# Patient Record
Sex: MALE | Race: WHITE | Employment: OTHER | URBAN - METROPOLITAN AREA
[De-identification: names, ages, dates, MRNs, and addresses within clinical notes are randomized per-mention and may not be internally consistent; named-entity substitution may affect disease eponyms.]

---

## 2017-01-03 ENCOUNTER — OFFICE VISIT (OUTPATIENT)
Dept: INTERNAL MEDICINE CLINIC | Age: 72
End: 2017-01-03

## 2017-01-03 VITALS
HEART RATE: 82 BPM | WEIGHT: 190.6 LBS | TEMPERATURE: 98.7 F | DIASTOLIC BLOOD PRESSURE: 90 MMHG | SYSTOLIC BLOOD PRESSURE: 142 MMHG | BODY MASS INDEX: 26.68 KG/M2 | RESPIRATION RATE: 12 BRPM | OXYGEN SATURATION: 95 % | HEIGHT: 71 IN

## 2017-01-03 DIAGNOSIS — G89.29 CHRONIC LOW BACK PAIN WITH SCIATICA, SCIATICA LATERALITY UNSPECIFIED, UNSPECIFIED BACK PAIN LATERALITY: ICD-10-CM

## 2017-01-03 DIAGNOSIS — M54.50 CHRONIC BILATERAL LOW BACK PAIN WITHOUT SCIATICA: ICD-10-CM

## 2017-01-03 DIAGNOSIS — M54.40 CHRONIC LOW BACK PAIN WITH SCIATICA, SCIATICA LATERALITY UNSPECIFIED, UNSPECIFIED BACK PAIN LATERALITY: ICD-10-CM

## 2017-01-03 DIAGNOSIS — H65.91 RIGHT OTITIS MEDIA WITH EFFUSION: Primary | ICD-10-CM

## 2017-01-03 DIAGNOSIS — E78.5 HYPERLIPIDEMIA WITH TARGET LDL LESS THAN 100: ICD-10-CM

## 2017-01-03 DIAGNOSIS — H91.91 HEARING LOSS, RIGHT: ICD-10-CM

## 2017-01-03 DIAGNOSIS — G89.29 CHRONIC BILATERAL LOW BACK PAIN WITHOUT SCIATICA: ICD-10-CM

## 2017-01-03 DIAGNOSIS — I10 ESSENTIAL HYPERTENSION: ICD-10-CM

## 2017-01-03 RX ORDER — OXYCODONE HYDROCHLORIDE 80 MG/1
160 TABLET, FILM COATED, EXTENDED RELEASE ORAL EVERY 12 HOURS
Qty: 120 TAB | Refills: 0 | Status: SHIPPED | OUTPATIENT
Start: 2017-01-03 | End: 2017-03-09 | Stop reason: SDUPTHER

## 2017-01-03 RX ORDER — OXYCODONE HYDROCHLORIDE 80 MG/1
160 TABLET, FILM COATED, EXTENDED RELEASE ORAL EVERY 12 HOURS
Qty: 120 TAB | Refills: 0 | Status: SHIPPED | OUTPATIENT
Start: 2017-01-03 | End: 2017-03-31 | Stop reason: SDUPTHER

## 2017-01-03 RX ORDER — OXYCODONE HYDROCHLORIDE 30 MG/1
30 TABLET ORAL
Qty: 90 TAB | Refills: 0 | Status: SHIPPED | OUTPATIENT
Start: 2017-01-03 | End: 2017-03-09 | Stop reason: SDUPTHER

## 2017-01-03 RX ORDER — OXYCODONE HYDROCHLORIDE 30 MG/1
30 TABLET ORAL
Qty: 90 TAB | Refills: 0 | Status: SHIPPED | OUTPATIENT
Start: 2017-01-03 | End: 2017-03-31 | Stop reason: SDUPTHER

## 2017-01-03 NOTE — PROGRESS NOTES
HISTORY OF PRESENT ILLNESS    Presents with right ear pressure and mild fullness for 10 day(s). Associated symptoms include: decreased hearing   Treatments tried include: was seen in ER 12/22 and 12.23 and placed on augmentin 12/24. Feels the pain is improving, but still reports decreased hearing. Taking mucinex and flonase    Feels his nausea is improved and denies abd pain. Did not have labs done  Wt Readings from Last 3 Encounters:   01/03/17 190 lb 9.6 oz (86.5 kg)   12/24/16 186 lb (84.4 kg)   12/23/16 194 lb 14.2 oz (88.4 kg)         Hypertension  Hypertension ROS: taking medications as instructed, no medication side effects noted, no TIA's, no chest pain on exertion, no dyspnea on exertion, no swelling of ankles     reports that he has never smoked. He has never used smokeless tobacco.    reports that he does not drink alcohol. BP Readings from Last 2 Encounters:   01/03/17 142/90   12/24/16 135/87       Review of Systems   All other systems reviewed and are negative, except as noted in HPI    Past Medical and Surgical History   has a past medical history of Arthritis; Chronic back pain; Chronic neck pain (2004); Depression, major; HTN (hypertension); Hyperlipidemia LDL goal < 100; Lumbar radicular pain; Psoriasis; PTSD (post-traumatic stress disorder); Seborrheic dermatitis; and Stenosis of cervical spine with myelopathy. has a past surgical history that includes lumbar laminectomy (1360) and colonoscopy (2008). reports that he has never smoked. He has never used smokeless tobacco. He reports that he does not drink alcohol.  family history includes Cancer in his mother. Physical Exam   Nursing note and vitals reviewed. Blood pressure 142/90, pulse 82, temperature 98.7 °F (37.1 °C), temperature source Oral, resp. rate 12, height 5' 11\" (1.803 m), weight 190 lb 9.6 oz (86.5 kg), SpO2 95 %. Constitutional: In no distress.     Eyes: Conjunctivae are normal.  HEENT:  No LAD or thyromegaly - right TM with erythema superiorly, no sig bulging, perhaps mild effusion  Cardiovascular: Normal rate. regular rhythm. No murmurs  No edema  Pulmonary/Chest: Effort normal. clear to ausculation blaterally  Musculoskeletal:  no edema. Abd:  Neurological: Alert and oriented. Grossly intact cranial nerves and motor function. Skin: No rash noted. Psychiatric: Normal mood and affect. Behavior is normal.     ASSESSMENT and PLAN  José Manuel was seen today for ed follow-up. Diagnoses and all orders for this visit:    Right otitis media with effusion  Hearing loss, right  Still recovering from OM. Should improve. Monitor for now. See ENT next week if hearing loss continues. -     REFERRAL TO ENT-OTOLARYNGOLOGY    Low back pain radiating to both legs- severe  Chronic back pain  Stenosis of cervical spine with myelopathy - moderate pain  Pain is controlled on high dose narcotics which we have weaned back from #180 oxycodone to #90 per month. Can not tolerate further weaning   He is unwilling to consider surgery due to risk. Surgery would be complicated and both lumbar and cervical spinal surgeries may be needed to reduce pain lvels  Injections, TENS units have not helped  Spinal cord stimulator device may help, but U was unwilling to do this without a pain management doctor seeing him. I have been unable to get a pain management doctor who takes his insurance to manage him  Has a clear definable reason for pain  He is functioning well   profile was accessed online for [facility] and reviewed by me during this encounter. I did not see evidence of inappropriate or suspicious controlled substance prescription activity. He has never demonstrated drug-seeking, but is habituated to long-term high-dose narcotic tx  -     oxyCODONE ER (OXYCONTIN) 80 mg ER tablet; Take 2 Tabs by mouth every twelve (12) hours. Max Daily Amount: 320 mg. Fill 2/9/17  -     oxyCODONE ER (OXYCONTIN) 80 mg ER tablet;  Take 2 Tabs by mouth every twelve (12) hours. Max Daily Amount: 320 mg. Fill 1/10/17  -     oxyCODONE IR (OXY-IR) 30 mg immediate release tablet; Take 1 Tab by mouth every eight (8) hours as needed for Pain. Max Daily Amount: 90 mg. For breakthrough pain. Fill 2/9/17  -     COMPLIANCE DRUG SCREEN/PRESCRIPTION MONITORING    Hyperlipidemia with target LDL less than 100  -     LIPID PANEL    Essential hypertension- Controlled on current regimen. Continue current medications as written in chart.  -     METABOLIC PANEL, COMPREHENSIVE        lab results and schedule of future lab studies reviewed with patient  reviewed medications and side effects in detail    Return to clinic for further evaluation if new symptoms develop or if current symptoms worsen or fail to resolve. There are no Patient Instructions on file for this visit.

## 2017-01-03 NOTE — MR AVS SNAPSHOT
Visit Information Date & Time Provider Department Dept. Phone Encounter #  
 1/3/2017 11:30 AM Aurelia Krause MD [facility] [address] [id number] Upcoming Health Maintenance Date Due Hepatitis C Screening 1945 ZOSTER VACCINE AGE 60> 12/1/2007 MEDICARE YEARLY EXAM 12/1/2012 Pneumococcal 65+ Low/Medium Risk (2 of 2 - PCV13) 8/12/2014 INFLUENZA AGE 9 TO ADULT 8/1/2016 GLAUCOMA SCREENING Q2Y 7/6/2017 COLONOSCOPY 1/1/2018 DTaP/Tdap/Td series (2 - Td) 8/12/2023 Allergies as of 1/3/2017  Review Complete On: 1/3/2017 By: Saeid Leblanc Severity Noted Reaction Type Reactions Amlodipine  02/07/2013    Nausea Only Wellbutrin [Bupropion Hcl]  01/30/2014    Other (comments) \"Stomach pressure\" Current Immunizations  Reviewed on 8/12/2013 Name Date Pneumococcal Polysaccharide (PPSV-23) 8/12/2013 Tdap 8/12/2013 Not reviewed this visit You Were Diagnosed With   
  
 Codes Comments Right otitis media with effusion    -  Primary ICD-10-CM: H65.91 
ICD-9-CM: 381.4 Hearing loss, right     ICD-10-CM: H91.91 
ICD-9-CM: 389. 9 Chronic low back pain with sciatica, sciatica laterality unspecified, unspecified back pain laterality     ICD-10-CM: M54.40, G89.29 ICD-9-CM: 724.2, 724.3, 338.29 Chronic bilateral low back pain without sciatica     ICD-10-CM: M54.5, G89.29 ICD-9-CM: 724.2, 338.29 Hyperlipidemia with target LDL less than 100     ICD-10-CM: E78.5 ICD-9-CM: 272.4 Essential hypertension     ICD-10-CM: I10 
ICD-9-CM: 401.9 Vitals BP Pulse Temp Resp Height(growth percentile) Weight(growth percentile) 142/90 (BP 1 Location: Left arm, BP Patient Position: Sitting) 82 98.7 °F (37.1 °C) (Oral) 12 5' 11\" (1.803 m) 190 lb 9.6 oz (86.5 kg) SpO2 BMI Smoking Status 95% 26.58 kg/m2 Never Smoker Vitals History BMI and BSA Data Body Mass Index Body Surface Area 26.58 kg/m 2 2.08 m 2 Preferred Pharmacy Pharmacy Name Phone [facility] [address] [place] [address] Dr STEARNS AT [facility] [phone] Your Updated Medication List  
  
   
This list is accurate as of: 1/3/17 12:29 PM.  Always use your most recent med list. amLODIPine 10 mg tablet Commonly known as:  Arecibo Mend Take 1 Tab by mouth daily. For blood pressure- increased dose 7//14/16  Indications: Hypertension  
  
 amoxicillin-clavulanate 500-125 mg per tablet Commonly known as:  AUGMENTIN Take 1 Tab by mouth two (2) times a day. clobetasol 0.05 % topical cream  
Commonly known as:  Caryle Killer Apply  to affected area two (2) times a day for 180 days. cloNIDine HCl 0.1 mg tablet Commonly known as:  CATAPRES Take 1 Tab by mouth nightly. dextromethorphan-guaiFENesin  mg Cap Commonly known as:  CORICIDIN HBP Take 1 Cap by mouth two (2) times daily as needed. fluticasone 50 mcg/actuation nasal spray Commonly known as:  Cathlyn Fred 2 Sprays by Both Nostrils route daily. guaiFENesin  mg ER tablet Commonly known as:  Mark & Mark Take 1 Tab by mouth two (2) times a day. lisinopril-hydroCHLOROthiazide 20-25 mg per tablet Commonly known as:  Donnamarie Parrot Take 1 pill TWICE daily  
  
 omeprazole 20 mg capsule Commonly known as:  PRILOSEC Take 1 Cap by mouth daily. For stomach acid - replaces ranitidine * oxyCODONE CR 80 mg CR tablet Commonly known as:  OxyCONTIN Take 2 Tabs by mouth every twelve (12) hours. Max Daily Amount: 320 mg. Fill 8/17/16. MEDICATION MEDICALLY NECESSARY  
  
 * oxyCODONE IR 30 mg immediate release tablet Commonly known as:  OXY-IR Take 1 Tab by mouth every eight (8) hours as needed for Pain. Max Daily Amount: 90 mg. For breakthrough pain. Fill 9/14/16 * oxyCODONE ER 80 mg ER tablet Commonly known as:  OxyCONTIN  
 Take 2 Tabs by mouth every twelve (12) hours. Max Daily Amount: 320 mg. Fill 2/9/17 * oxyCODONE ER 80 mg ER tablet Commonly known as:  OxyCONTIN Take 2 Tabs by mouth every twelve (12) hours. Max Daily Amount: 320 mg. Fill 1/10/17 * oxyCODONE IR 30 mg immediate release tablet Commonly known as:  OXY-IR Take 1 Tab by mouth every eight (8) hours as needed for Pain. Max Daily Amount: 90 mg. For breakthrough pain. Fill 1/10/17 * oxyCODONE IR 30 mg immediate release tablet Commonly known as:  OXY-IR Take 1 Tab by mouth every eight (8) hours as needed for Pain. Max Daily Amount: 90 mg. For breakthrough pain. Fill 2/9/17 * Notice: This list has 6 medication(s) that are the same as other medications prescribed for you. Read the directions carefully, and ask your doctor or other care provider to review them with you. Prescriptions Printed Refills  
 oxyCODONE ER (OXYCONTIN) 80 mg ER tablet 0 Sig: Take 2 Tabs by mouth every twelve (12) hours. Max Daily Amount: 320 mg. Fill 2/9/17 Class: Print Route: Oral  
 oxyCODONE ER (OXYCONTIN) 80 mg ER tablet 0 Sig: Take 2 Tabs by mouth every twelve (12) hours. Max Daily Amount: 320 mg. Fill 1/10/17 Class: Print Route: Oral  
 oxyCODONE IR (OXY-IR) 30 mg immediate release tablet 0 Sig: Take 1 Tab by mouth every eight (8) hours as needed for Pain. Max Daily Amount: 90 mg. For breakthrough pain. Fill 1/10/17 Class: Print Route: Oral  
 oxyCODONE IR (OXY-IR) 30 mg immediate release tablet 0 Sig: Take 1 Tab by mouth every eight (8) hours as needed for Pain. Max Daily Amount: 90 mg. For breakthrough pain. Fill 2/9/17 Class: Print Route: Oral  
  
Referral Information Referral ID Referred By Referred To  
  
 [id number] [facility] [address] [place] [address] [phone] [place], [address] [facility] Visits Status Start Date End Date 1 New Request 1/3/17 1/3/18 If your referral has a status of pending review or denied, additional information will be sent to support the outcome of this decision. Introducing [facility]! Pete Bowman introduces [employer] patient portal. Now you can access parts of your medical record, email your doctor's office, and request medication refills online. 1. In your internet browser, go to [email or web address] 2. Click on the First Time User? Click Here link in the Sign In box. You will see the New Member Sign Up page. 3. Enter your Syn[employer] Access Code exactly as it appears below. You will not need to use this code after youve completed the sign-up process. If you do not sign up before the expiration date, you must request a new code. · Syn[employer] Access Code: [email or web address] Expires: 3/23/2017  3:24 PM 
 
4. Enter the last four digits of your Social Security Number (xxxx) and Date of Birth (mm/dd/yyyy) as indicated and click Submit. You will be taken to the next sign-up page. 5. Create a Synchronica ID. This will be your Synchronic[employer] login ID and cannot be changed, so think of one that is secure and easy to remember. 6. Create a Syn[employer] password. You can change your password at any time. 7. Enter your Password Reset Question and Answer. This can be used at a later time if you forget your password. 8. Enter your e-mail address. You will receive e-mail notification when new information is available in [address] 9. Click Sign Up. You can now view and download portions of your medical record. 10. Click the Download Summary menu link to download a portable copy of your medical information. If you have questions, please visit the Frequently Asked Questions section of the [employer] website. Remember, [employer] is NOT to be used for urgent needs. For medical emergencies, dial 911. Now available from your iPhone and Android! Please provide this summary of care documentation to your next provider. Your primary care clinician is listed as Roman Fountain. If you have any questions after today's visit, please call [phone].

## 2017-01-03 NOTE — PROGRESS NOTES
Patient states he was seen in ER twice. He was given an antibiotic for a right otitis media. His ear is blocked today.

## 2017-01-16 ENCOUNTER — HOSPITAL ENCOUNTER (OUTPATIENT)
Dept: LAB | Age: 72
Discharge: HOME | End: 2017-01-16
Payer: MEDICARE

## 2017-01-16 PROCEDURE — 36415 COLL VENOUS BLD VENIPUNCTURE: CPT

## 2017-01-16 PROCEDURE — 82570 ASSAY OF URINE CREATININE: CPT

## 2017-01-16 PROCEDURE — 80061 LIPID PANEL: CPT

## 2017-01-16 PROCEDURE — 80053 COMPREHEN METABOLIC PANEL: CPT

## 2017-01-17 LAB
ALBUMIN SERPL-MCNC: 4.4 G/DL (ref 3.6–4.8)
ALBUMIN/GLOB SERPL: 1.5 {RATIO} (ref 1.1–2.5)
ALP SERPL-CCNC: 70 IU/L (ref 39–117)
ALT SERPL-CCNC: 24 IU/L (ref 0–44)
AST SERPL-CCNC: 22 IU/L (ref 0–40)
BILIRUB SERPL-MCNC: 0.4 MG/DL (ref 0–1.2)
BUN SERPL-MCNC: 23 MG/DL (ref 8–27)
BUN/CREAT SERPL: 21 (ref 10–22)
CALCIUM SERPL-MCNC: 9.7 MG/DL (ref 8.6–10.2)
CHLORIDE SERPL-SCNC: 97 MMOL/L (ref 96–106)
CHOLEST SERPL-MCNC: 206 MG/DL (ref 100–199)
CO2 SERPL-SCNC: 28 MMOL/L (ref 18–29)
CREAT SERPL-MCNC: 1.09 MG/DL (ref 0.76–1.27)
DRUGS UR: NORMAL
GLOBULIN SER CALC-MCNC: 2.9 G/DL (ref 1.5–4.5)
GLUCOSE SERPL-MCNC: 81 MG/DL (ref 65–99)
HDLC SERPL-MCNC: 64 MG/DL
INTERPRETATION, 910389: NORMAL
LDLC SERPL CALC-MCNC: 116 MG/DL (ref 0–99)
POTASSIUM SERPL-SCNC: 4.4 MMOL/L (ref 3.5–5.2)
PROT SERPL-MCNC: 7.3 G/DL (ref 6–8.5)
SODIUM SERPL-SCNC: 142 MMOL/L (ref 134–144)
TRIGL SERPL-MCNC: 132 MG/DL (ref 0–149)
VLDLC SERPL CALC-MCNC: 26 MG/DL (ref 5–40)

## 2017-03-06 ENCOUNTER — OFFICE VISIT (OUTPATIENT)
Dept: INTERNAL MEDICINE CLINIC | Age: 72
End: 2017-03-06

## 2017-03-06 VITALS
DIASTOLIC BLOOD PRESSURE: 84 MMHG | SYSTOLIC BLOOD PRESSURE: 145 MMHG | OXYGEN SATURATION: 98 % | TEMPERATURE: 98.2 F | HEIGHT: 71 IN | HEART RATE: 85 BPM | WEIGHT: 196 LBS | RESPIRATION RATE: 16 BRPM | BODY MASS INDEX: 27.44 KG/M2

## 2017-03-06 DIAGNOSIS — H91.91 HEARING LOSS IN RIGHT EAR: Primary | ICD-10-CM

## 2017-03-06 NOTE — PROGRESS NOTES
Have you been to the ER or urgent care clinic since your last visit? ER  Rene Will   12/24/16  Severe right ear pain     meds x 10 days    Better      On and off pain since    Have you been hospitalized since your last visit? No     Have you been seen or consulted any other health care provider outside of [address] [place] [address] since your last visit (including pap smears, colonoscopy screening)?     No

## 2017-03-06 NOTE — PROGRESS NOTES
HISTORY OF PRESENT ILLNESS  Shree Yeung is a 73 y.o. male. HPI  Presents to discuss his ongoing hearing loss in right ear. Was treated for infection in 12/2016 and 1/2017 and developed sudden hearing loss; was referred to ENT by Dr Myriam Degroot; he reports ENT told him that he did not see any signs of chronic fluid and was scheduled to have MRI of brain to assess for possible sensorineural etiology of hearing loss. He has not scheduled this and states that he does not want to have this done and wants Dr Servando Solorzano opinion. Has occasional crackling sensation in right ear when swallowing. Review of Systems   Constitutional: Negative for chills, fever and malaise/fatigue. HENT: Positive for ear pain and hearing loss. Negative for congestion and sore throat. Respiratory: Negative for cough. Cardiovascular: Negative for chest pain and palpitations. Gastrointestinal: Negative for nausea and vomiting. Skin: Negative for rash. Neurological: Negative for dizziness, tingling and headaches. /84 (BP 1 Location: Left arm, BP Patient Position: Sitting)  Pulse 85  Temp 98.2 °F (36.8 °C) (Oral)   Resp 16  Ht 5' 11\" (1.803 m)  Wt 196 lb (88.9 kg)  SpO2 98%  BMI 27.34 kg/m2  Physical Exam   Constitutional: He appears well-developed and well-nourished. HENT:   Head: Normocephalic and atraumatic. Right Ear: External ear normal. Tympanic membrane is not injected. No middle ear effusion. Left Ear: External ear normal. Tympanic membrane is not injected. No middle ear effusion. Nose: Nose normal. No mucosal edema. Mouth/Throat: No posterior oropharyngeal erythema. Neck: Normal range of motion. Neck supple. No thyromegaly present. Cardiovascular: Normal rate and regular rhythm. Pulmonary/Chest: Effort normal and breath sounds normal.   Lymphadenopathy:     He has no cervical adenopathy. Psychiatric: He has a normal mood and affect.  His behavior is normal.   Nursing note and vitals reviewed. ASSESSMENT and PLAN  José Manuel was seen today for ear pain. Diagnoses and all orders for this visit:    Hearing loss in right ear --- advised patient that MRI may be necessary for further evaluation of his persistent hearing loss; offered to give him another name of ENT for second opinion if desired but he would like Dr Effie Mora opinion on this matter.       reviewed diet, exercise and weight control  reviewed medications and side effects in detail

## 2017-03-09 ENCOUNTER — TELEPHONE (OUTPATIENT)
Dept: INTERNAL MEDICINE CLINIC | Age: 72
End: 2017-03-09

## 2017-03-09 DIAGNOSIS — M54.40 CHRONIC LOW BACK PAIN WITH SCIATICA, SCIATICA LATERALITY UNSPECIFIED, UNSPECIFIED BACK PAIN LATERALITY: ICD-10-CM

## 2017-03-09 DIAGNOSIS — M54.50 CHRONIC BILATERAL LOW BACK PAIN WITHOUT SCIATICA: ICD-10-CM

## 2017-03-09 DIAGNOSIS — G89.29 CHRONIC BILATERAL LOW BACK PAIN WITHOUT SCIATICA: ICD-10-CM

## 2017-03-09 DIAGNOSIS — G89.29 CHRONIC LOW BACK PAIN WITH SCIATICA, SCIATICA LATERALITY UNSPECIFIED, UNSPECIFIED BACK PAIN LATERALITY: ICD-10-CM

## 2017-03-09 RX ORDER — OXYCODONE HYDROCHLORIDE 80 MG/1
160 TABLET, FILM COATED, EXTENDED RELEASE ORAL EVERY 12 HOURS
Qty: 120 TAB | Refills: 0 | Status: SHIPPED | OUTPATIENT
Start: 2017-03-09 | End: 2017-03-31 | Stop reason: SDUPTHER

## 2017-03-09 RX ORDER — OXYCODONE HYDROCHLORIDE 30 MG/1
30 TABLET ORAL
Qty: 90 TAB | Refills: 0 | Status: SHIPPED | OUTPATIENT
Start: 2017-03-09 | End: 2017-03-31 | Stop reason: SDUPTHER

## 2017-03-09 NOTE — TELEPHONE ENCOUNTER
Patient is requesting pain medication to be filled today.   He wants a nurse to call him back and let him know if he can get pain medication because if not he is going to the ER to get it.  [phone]

## 2017-03-09 NOTE — TELEPHONE ENCOUNTER
Last scripts were filled 2/9/2017, pt is not out but will be Friday.  Appointment made, last appointment with PCP was a no show

## 2017-03-10 ENCOUNTER — DOCUMENTATION ONLY (OUTPATIENT)
Dept: INTERNAL MEDICINE CLINIC | Age: 72
End: 2017-03-10

## 2017-03-31 ENCOUNTER — HOSPITAL ENCOUNTER (OUTPATIENT)
Dept: LAB | Age: 72
Discharge: HOME | End: 2017-03-31
Payer: MEDICARE

## 2017-03-31 ENCOUNTER — OFFICE VISIT (OUTPATIENT)
Dept: INTERNAL MEDICINE CLINIC | Age: 72
End: 2017-03-31

## 2017-03-31 VITALS
SYSTOLIC BLOOD PRESSURE: 175 MMHG | DIASTOLIC BLOOD PRESSURE: 94 MMHG | TEMPERATURE: 98.4 F | WEIGHT: 205 LBS | HEIGHT: 71 IN | HEART RATE: 97 BPM | RESPIRATION RATE: 12 BRPM | BODY MASS INDEX: 28.7 KG/M2

## 2017-03-31 DIAGNOSIS — F43.10 PTSD (POST-TRAUMATIC STRESS DISORDER): ICD-10-CM

## 2017-03-31 DIAGNOSIS — H69.81 DYSFUNCTION OF RIGHT EUSTACHIAN TUBE: Primary | ICD-10-CM

## 2017-03-31 DIAGNOSIS — M48.02 STENOSIS OF CERVICAL SPINE WITH MYELOPATHY (HCC): ICD-10-CM

## 2017-03-31 DIAGNOSIS — M54.40 CHRONIC LOW BACK PAIN WITH SCIATICA, SCIATICA LATERALITY UNSPECIFIED, UNSPECIFIED BACK PAIN LATERALITY: ICD-10-CM

## 2017-03-31 DIAGNOSIS — L40.9 PSORIASIS: ICD-10-CM

## 2017-03-31 DIAGNOSIS — I10 ESSENTIAL HYPERTENSION: ICD-10-CM

## 2017-03-31 DIAGNOSIS — G89.29 CHRONIC BILATERAL LOW BACK PAIN WITHOUT SCIATICA: ICD-10-CM

## 2017-03-31 DIAGNOSIS — Z51.81 ENCOUNTER FOR MEDICATION MONITORING: ICD-10-CM

## 2017-03-31 DIAGNOSIS — G89.29 CHRONIC LOW BACK PAIN WITH SCIATICA, SCIATICA LATERALITY UNSPECIFIED, UNSPECIFIED BACK PAIN LATERALITY: ICD-10-CM

## 2017-03-31 DIAGNOSIS — G99.2 STENOSIS OF CERVICAL SPINE WITH MYELOPATHY (HCC): ICD-10-CM

## 2017-03-31 DIAGNOSIS — M54.50 CHRONIC BILATERAL LOW BACK PAIN WITHOUT SCIATICA: ICD-10-CM

## 2017-03-31 PROCEDURE — 82570 ASSAY OF URINE CREATININE: CPT

## 2017-03-31 RX ORDER — OXYCODONE HYDROCHLORIDE 30 MG/1
30 TABLET ORAL
Qty: 90 TAB | Refills: 0 | Status: SHIPPED | OUTPATIENT
Start: 2017-03-31 | End: 2017-06-05 | Stop reason: SDUPTHER

## 2017-03-31 RX ORDER — CLOBETASOL PROPIONATE 0.5 MG/G
CREAM TOPICAL 2 TIMES DAILY
Qty: 30 G | Refills: 5 | Status: SHIPPED | OUTPATIENT
Start: 2017-03-31 | End: 2017-06-05 | Stop reason: ALTCHOICE

## 2017-03-31 RX ORDER — OXYCODONE HYDROCHLORIDE 80 MG/1
160 TABLET, FILM COATED, EXTENDED RELEASE ORAL EVERY 12 HOURS
Qty: 120 TAB | Refills: 0 | Status: SHIPPED | OUTPATIENT
Start: 2017-03-31 | End: 2017-06-05 | Stop reason: SDUPTHER

## 2017-03-31 RX ORDER — METHYLPREDNISOLONE 4 MG/1
TABLET ORAL
Qty: 1 DOSE PACK | Refills: 0 | Status: SHIPPED | OUTPATIENT
Start: 2017-03-31 | End: 2017-04-10

## 2017-03-31 NOTE — MR AVS SNAPSHOT
Visit Information Date & Time Provider Department Dept. Phone Encounter #  
 3/31/2017  4:00 PM Howard Bearden MD [facility] [address] [id number] Upcoming Health Maintenance Date Due Hepatitis C Screening 1945 ZOSTER VACCINE AGE 60> 12/1/2007 MEDICARE YEARLY EXAM 12/1/2012 Pneumococcal 65+ Low/Medium Risk (2 of 2 - PCV13) 8/12/2014 INFLUENZA AGE 9 TO ADULT 8/1/2016 GLAUCOMA SCREENING Q2Y 7/6/2017 COLONOSCOPY 1/1/2018 DTaP/Tdap/Td series (2 - Td) 8/12/2023 Allergies as of 3/31/2017  Review Complete On: 3/31/2017 By: Howard Bearden MD  
  
 Severity Noted Reaction Type Reactions Amlodipine  02/07/2013    Nausea Only Wellbutrin [Bupropion Hcl]  01/30/2014    Other (comments) \"Stomach pressure\" Current Immunizations  Reviewed on 8/12/2013 Name Date Pneumococcal Polysaccharide (PPSV-23) 8/12/2013 Tdap 8/12/2013 Not reviewed this visit You Were Diagnosed With   
  
 Codes Comments Dysfunction of right eustachian tube    -  Primary ICD-10-CM: H69.81 ICD-9-CM: 381.81 Essential hypertension     ICD-10-CM: I10 
ICD-9-CM: 401.9 Encounter for medication monitoring     ICD-10-CM: Z51.81 
ICD-9-CM: V58.83 Stenosis of cervical spine with myelopathy     ICD-10-CM: M47.12 
ICD-9-CM: 721.1 PTSD (post-traumatic stress disorder)     ICD-10-CM: F43.10 ICD-9-CM: 309.81 Chronic low back pain with sciatica, sciatica laterality unspecified, unspecified back pain laterality     ICD-10-CM: M54.40, G89.29 ICD-9-CM: 724.2, 724.3, 338.29 Chronic bilateral low back pain without sciatica     ICD-10-CM: M54.5, G89.29 ICD-9-CM: 724.2, 338.29 Psoriasis     ICD-10-CM: L40.9 ICD-9-CM: 696.1 Vitals BP Pulse Temp Resp Height(growth percentile) Weight(growth percentile)  (!) 175/94 (BP 1 Location: Left arm, BP Patient Position: Sitting) 97 98.4 °F (36.9 °C) (Oral) 12 5' 11\" (1.803 m) 205 lb (93 kg) BMI Smoking Status 28.59 kg/m2 Never Smoker Vitals History BMI and BSA Data Body Mass Index Body Surface Area 28.59 kg/m 2 2.16 m 2 Preferred Pharmacy Pharmacy Name Phone [facility] [address] [place] [address] Dr STEARNS AT [facility] [phone] Your Updated Medication List  
  
   
This list is accurate as of: 3/31/17  5:10 PM.  Always use your most recent med list. amLODIPine 10 mg tablet Commonly known as:  Voncile Erie Take 1 Tab by mouth daily. For blood pressure- increased dose 7//14/16  Indications: Hypertension  
  
 clobetasol 0.05 % topical cream  
Commonly known as:  Linzie Rubins Apply  to affected area two (2) times a day. cloNIDine HCl 0.1 mg tablet Commonly known as:  CATAPRES Take 1 Tab by mouth nightly. lisinopril-hydroCHLOROthiazide 20-25 mg per tablet Commonly known as:  Yon Vania Take 1 pill TWICE daily  
  
 methylPREDNISolone 4 mg tablet Commonly known as:  Barbarann Punt USE AS DIRECTED ON PACKAGE  
  
 omeprazole 20 mg capsule Commonly known as:  PRILOSEC Take 1 Cap by mouth daily. For stomach acid - replaces ranitidine * oxyCODONE CR 80 mg CR tablet Commonly known as:  OxyCONTIN Take 2 Tabs by mouth every twelve (12) hours. Max Daily Amount: 320 mg. Fill 8/17/16. MEDICATION MEDICALLY NECESSARY  
  
 * oxyCODONE IR 30 mg immediate release tablet Commonly known as:  OXY-IR Take 1 Tab by mouth every eight (8) hours as needed for Pain. Max Daily Amount: 90 mg. For breakthrough pain. Fill 9/14/16 * oxyCODONE IR 30 mg immediate release tablet Commonly known as:  OXY-IR Take 1 Tab by mouth every eight (8) hours as needed for Pain. Max Daily Amount: 90 mg. For breakthrough pain. Fill 5/8/17 * oxyCODONE ER 80 mg ER tablet Commonly known as:  OxyCONTIN  
 Take 2 Tabs by mouth every twelve (12) hours. Max Daily Amount: 320 mg. Fill 5/8/17 * oxyCODONE IR 30 mg immediate release tablet Commonly known as:  OXY-IR Take 1 Tab by mouth every eight (8) hours as needed for Pain. Max Daily Amount: 90 mg. For breakthrough pain. Fill 4/9/17 * oxyCODONE ER 80 mg ER tablet Commonly known as:  OxyCONTIN Take 2 Tabs by mouth every twelve (12) hours. Max Daily Amount: 320 mg. Fill 4/9/17 * Notice: This list has 6 medication(s) that are the same as other medications prescribed for you. Read the directions carefully, and ask your doctor or other care provider to review them with you. Prescriptions Printed Refills  
 oxyCODONE IR (OXY-IR) 30 mg immediate release tablet 0 Sig: Take 1 Tab by mouth every eight (8) hours as needed for Pain. Max Daily Amount: 90 mg. For breakthrough pain. Fill 5/8/17 Class: Print Route: Oral  
 oxyCODONE ER (OXYCONTIN) 80 mg ER tablet 0 Sig: Take 2 Tabs by mouth every twelve (12) hours. Max Daily Amount: 320 mg. Fill 5/8/17 Class: Print Route: Oral  
 oxyCODONE IR (OXY-IR) 30 mg immediate release tablet 0 Sig: Take 1 Tab by mouth every eight (8) hours as needed for Pain. Max Daily Amount: 90 mg. For breakthrough pain. Fill 4/9/17 Class: Print Route: Oral  
 oxyCODONE ER (OXYCONTIN) 80 mg ER tablet 0 Sig: Take 2 Tabs by mouth every twelve (12) hours. Max Daily Amount: 320 mg. Fill 4/9/17 Class: Print Route: Oral  
  
Prescriptions Sent to Pharmacy Refills  
 methylPREDNISolone (MEDROL DOSEPACK) 4 mg tablet 0 Sig: USE AS DIRECTED ON PACKAGE Class: Normal  
 Pharmacy: [facility] Ph #: [phone]  
 clobetasol (TEMOVATE) 0.05 % topical cream 5 Sig: Apply  to affected area two (2) times a day.   
 Class: Normal  
 Pharmacy: [facility] [address],[facility]  #: [phone] Route: Topical  
  
We Performed the Following [address] MONITORING [[id number] Custom] Introducing [facility]! Catracho Deluna introduces [employer] patient portal. Now you can access parts of your medical record, email your doctor's office, and request medication refills online. 1. In your internet browser, go to [email or web address] 2. Click on the First Time User? Click Here link in the Sign In box. You will see the New Member Sign Up page. 3. Enter your [employer] Access Code exactly as it appears below. You will not need to use this code after youve completed the sign-up process. If you do not sign up before the expiration date, you must request a new code. · [employer] Access Code: [email or web address] Expires: 6/29/2017  5:10 PM 
 
4. Enter the last four digits of your Social Security Number (xxxx) and Date of Birth (mm/dd/yyyy) as indicated and click Submit. You will be taken to the next sign-up page. 5. Create a [employer] ID. This will be your [employer] login ID and cannot be changed, so think of one that is secure and easy to remember. 6. Create a [employer] password. You can change your password at any time. 7. Enter your Password Reset Question and Answer. This can be used at a later time if you forget your password. 8. Enter your e-mail address. You will receive e-mail notification when new information is available in [address] 9. Click Sign Up. You can now view and download portions of your medical record. 10. Click the Download Summary menu link to download a portable copy of your medical information. If you have questions, please visit the Frequently Asked Questions section of the [employer] website. Remember, [employer] is NOT to be used for urgent needs. For medical emergencies, dial 911. Now available from your iPhone and Android! Please provide this summary of care documentation to your next provider. Your primary care clinician is listed as Magalys Glass. If you have any questions after today's visit, please call [phone].
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
## 2017-04-06 LAB — DRUGS UR: NORMAL

## 2017-06-05 ENCOUNTER — OFFICE VISIT (OUTPATIENT)
Dept: INTERNAL MEDICINE CLINIC | Age: 72
End: 2017-06-05

## 2017-06-05 VITALS
SYSTOLIC BLOOD PRESSURE: 123 MMHG | RESPIRATION RATE: 18 BRPM | WEIGHT: 203 LBS | HEART RATE: 74 BPM | TEMPERATURE: 98.3 F | OXYGEN SATURATION: 96 % | HEIGHT: 71 IN | BODY MASS INDEX: 28.42 KG/M2 | DIASTOLIC BLOOD PRESSURE: 86 MMHG

## 2017-06-05 DIAGNOSIS — M54.50 CHRONIC BILATERAL LOW BACK PAIN WITHOUT SCIATICA: ICD-10-CM

## 2017-06-05 DIAGNOSIS — G89.29 CHRONIC LOW BACK PAIN WITH SCIATICA, SCIATICA LATERALITY UNSPECIFIED, UNSPECIFIED BACK PAIN LATERALITY: ICD-10-CM

## 2017-06-05 DIAGNOSIS — G89.29 CHRONIC BILATERAL LOW BACK PAIN WITHOUT SCIATICA: ICD-10-CM

## 2017-06-05 DIAGNOSIS — K42.9 UMBILICAL HERNIA WITHOUT OBSTRUCTION AND WITHOUT GANGRENE: Primary | ICD-10-CM

## 2017-06-05 DIAGNOSIS — J02.9 SORE THROAT: ICD-10-CM

## 2017-06-05 DIAGNOSIS — M54.40 CHRONIC LOW BACK PAIN WITH SCIATICA, SCIATICA LATERALITY UNSPECIFIED, UNSPECIFIED BACK PAIN LATERALITY: ICD-10-CM

## 2017-06-05 DIAGNOSIS — R11.0 NAUSEA: ICD-10-CM

## 2017-06-05 RX ORDER — OXYCODONE HYDROCHLORIDE 80 MG/1
160 TABLET, FILM COATED, EXTENDED RELEASE ORAL EVERY 12 HOURS
Qty: 120 TAB | Refills: 0 | Status: SHIPPED | OUTPATIENT
Start: 2017-06-05 | End: 2017-08-02 | Stop reason: SDUPTHER

## 2017-06-05 RX ORDER — BETAMETHASONE DIPROPIONATE 0.5 MG/G
CREAM TOPICAL 2 TIMES DAILY
Qty: 30 G | Refills: 3 | Status: SHIPPED | OUTPATIENT
Start: 2017-06-05 | End: 2018-05-23 | Stop reason: ALTCHOICE

## 2017-06-05 RX ORDER — NALOXONE HYDROCHLORIDE 1 MG/ML
1 INJECTION INTRAMUSCULAR; INTRAVENOUS; SUBCUTANEOUS
Qty: 1 SYRINGE | Refills: 3 | Status: SHIPPED | OUTPATIENT
Start: 2017-06-05 | End: 2017-08-02

## 2017-06-05 RX ORDER — OXYCODONE HYDROCHLORIDE 30 MG/1
30 TABLET ORAL
Qty: 90 TAB | Refills: 0 | Status: SHIPPED | OUTPATIENT
Start: 2017-06-05 | End: 2017-08-02 | Stop reason: SDUPTHER

## 2017-06-05 RX ORDER — HALOBETASOL PROPIONATE 0.5 MG/G
CREAM TOPICAL 2 TIMES DAILY
Qty: 30 G | Refills: 3 | Status: SHIPPED | OUTPATIENT
Start: 2017-06-05 | End: 2018-03-23 | Stop reason: ALTCHOICE

## 2017-06-05 NOTE — MR AVS SNAPSHOT
Visit Information Date & Time Provider Department Dept. Phone Encounter #  
 6/5/2017  4:15 PM Thuan Medellin MD [facility] [address] [id number] Upcoming Health Maintenance Date Due Hepatitis C Screening 1945 ZOSTER VACCINE AGE 60> 12/1/2007 MEDICARE YEARLY EXAM 12/1/2012 Pneumococcal 65+ Low/Medium Risk (2 of 2 - PCV13) 8/12/2014 GLAUCOMA SCREENING Q2Y 7/6/2017 INFLUENZA AGE 9 TO ADULT 8/1/2017 COLONOSCOPY 1/1/2018 DTaP/Tdap/Td series (2 - Td) 8/12/2023 Allergies as of 6/5/2017  Review Complete On: 6/5/2017 By: Mickie Gomes LPN Severity Noted Reaction Type Reactions Amlodipine  02/07/2013    Nausea Only Wellbutrin [Bupropion Hcl]  01/30/2014    Other (comments) \"Stomach pressure\" Current Immunizations  Reviewed on 8/12/2013 Name Date Pneumococcal Polysaccharide (PPSV-23) 8/12/2013 Tdap 8/12/2013 Not reviewed this visit You Were Diagnosed With   
  
 Codes Comments Umbilical hernia without obstruction and without gangrene    -  Primary ICD-10-CM: K42.9 ICD-9-CM: 553.1 Nausea     ICD-10-CM: R11.0 ICD-9-CM: 787.02 Sore throat     ICD-10-CM: J02.9 ICD-9-CM: 823 Chronic bilateral low back pain without sciatica     ICD-10-CM: M54.5, G89.29 ICD-9-CM: 724.2, 338.29 Chronic low back pain with sciatica, sciatica laterality unspecified, unspecified back pain laterality     ICD-10-CM: M54.40, G89.29 ICD-9-CM: 724.2, 724.3, 338.29 Vitals BP Pulse Temp Resp Height(growth percentile) Weight(growth percentile) 123/86 74 98.3 °F (36.8 °C) (Oral) 18 5' 11\" (1.803 m) 203 lb (92.1 kg) SpO2 BMI Smoking Status 96% 28.31 kg/m2 Never Smoker Vitals History BMI and BSA Data Body Mass Index Body Surface Area  
 28.31 kg/m 2 2.15 m 2 Preferred Pharmacy Pharmacy Name Phone [facility] [place], [address] [place] [facility] [phone] Your Updated Medication List  
  
   
This list is accurate as of: 6/5/17  5:29 PM.  Always use your most recent med list. amLODIPine 10 mg tablet Commonly known as:  Galina Bradleyt Take 1 Tab by mouth daily. For blood pressure- increased dose 7//14/16  Indications: Hypertension  
  
 augmented betamethasone dipropionate 0.05 % topical cream  
Commonly known as:  Goran Ligas Apply  to affected area two (2) times a day. cloNIDine HCl 0.1 mg tablet Commonly known as:  CATAPRES Take 1 Tab by mouth nightly. halobetasol 0.05 % topical cream  
Commonly known as:  Ramiro Gladys Apply  to affected area two (2) times a day. use thin layer  
  
 lisinopril-hydroCHLOROthiazide 20-25 mg per tablet Commonly known as:  Galina Karvonen Take 1 pill TWICE daily  
  
 naloxone 1 mg/mL injection Commonly known as:  NARCAN  
1 mL by IntraMUSCular route once as needed for up to 1 dose. Indications: OPIOID TOXICITY  
  
 omeprazole 20 mg capsule Commonly known as:  PRILOSEC Take 1 Cap by mouth daily. For stomach acid - replaces ranitidine * oxyCODONE CR 80 mg CR tablet Commonly known as:  OxyCONTIN Take 2 Tabs by mouth every twelve (12) hours. Max Daily Amount: 320 mg. Fill 8/17/16. MEDICATION MEDICALLY NECESSARY  
  
 * oxyCODONE IR 30 mg immediate release tablet Commonly known as:  OXY-IR Take 1 Tab by mouth every eight (8) hours as needed for Pain. Max Daily Amount: 90 mg. For breakthrough pain. Fill 9/14/16 * oxyCODONE IR 30 mg immediate release tablet Commonly known as:  OXY-IR Take 1 Tab by mouth every eight (8) hours as needed for Pain. Max Daily Amount: 90 mg. For breakthrough pain. Fill 7/6/17 * oxyCODONE ER 80 mg ER tablet Commonly known as:  OxyCONTIN Take 2 Tabs by mouth every twelve (12) hours. Max Daily Amount: 320 mg. Fill 17 * oxyCODONE IR 30 mg immediate release tablet Commonly known as:  OXY-IR Take 1 Tab by mouth every eight (8) hours as needed for Pain. Max Daily Amount: 90 mg. For breakthrough pain. Fill 17 * oxyCODONE ER 80 mg ER tablet Commonly known as:  OxyCONTIN Take 2 Tabs by mouth every twelve (12) hours. Max Daily Amount: 320 mg. Fill 17 * Notice: This list has 6 medication(s) that are the same as other medications prescribed for you. Read the directions carefully, and ask your doctor or other care provider to review them with you. Prescriptions Printed Refills  
 oxyCODONE IR (OXY-IR) 30 mg immediate release tablet 0 Sig: Take 1 Tab by mouth every eight (8) hours as needed for Pain. Max Daily Amount: 90 mg. For breakthrough pain. Fill 17 Class: Print Route: Oral  
 oxyCODONE ER (OXYCONTIN) 80 mg ER tablet 0 Sig: Take 2 Tabs by mouth every twelve (12) hours. Max Daily Amount: 320 mg. Fill 17 Class: Print Route: Oral  
 oxyCODONE IR (OXY-IR) 30 mg immediate release tablet 0 Sig: Take 1 Tab by mouth every eight (8) hours as needed for Pain. Max Daily Amount: 90 mg. For breakthrough pain. Fill 17 Class: Print Route: Oral  
 oxyCODONE ER (OXYCONTIN) 80 mg ER tablet 0 Sig: Take 2 Tabs by mouth every twelve (12) hours. Max Daily Amount: 320 mg. Fill 17 Class: Print Route: Oral  
 naloxone (NARCAN) 1 mg/mL injection 3 Si mL by IntraMUSCular route once as needed for up to 1 dose. Indications: OPIOID TOXICITY Class: Print Route: IntraMUSCular  
 augmented betamethasone dipropionate (DIPROLENE-AF) 0.05 % topical cream 3 Sig: Apply  to affected area two (2) times a day. Class: Print Route: Topical  
 halobetasol (ULTRAVATE) 0.05 % topical cream 3 Sig: Apply  to affected area two (2) times a day. use thin layer Class: Print Route: Topical  
  
We Performed the Following REFERRAL TO GENERAL SURGERY [REF27 Custom] Comments:  
 Please evaluate patient for umbilical hernia. Increasing in size 3 months. Referral Information Referral ID Referred By Referred To Lexy Navarrete Ala, MD [address] Phone: [phone] Fax: [phone] Visits Status Start Date End Date 1 New Request 6/5/17 6/5/18 If your referral has a status of pending review or denied, additional information will be sent to support the outcome of this decision. Introducing [facility]! Bautista Amaro introduces [employer] patient portal. Now you can access parts of your medical record, email your doctor's office, and request medication refills online. 1. In your internet browser, go to [email or web address] 2. Click on the First Time User? Click Here link in the Sign In box. You will see the New Member Sign Up page. 3. Enter your [employer] Access Code exactly as it appears below. You will not need to use this code after youve completed the sign-up process. If you do not sign up before the expiration date, you must request a new code. · [employer] Access Code: [email or web address] Expires: 6/29/2017  5:10 PM 
 
4. Enter the last four digits of your Social Security Number (xxxx) and Date of Birth (mm/dd/yyyy) as indicated and click Submit. You will be taken to the next sign-up page. 5. Create a [employer] ID. This will be your [employer] login ID and cannot be changed, so think of one that is secure and easy to remember. 6. Create a [employer] password. You can change your password at any time. 7. Enter your Password Reset Question and Answer. This can be used at a later time if you forget your password. 8. Enter your e-mail address. You will receive e-mail notification when new information is available in [address] 9. Click Sign Up. You can now view and download portions of your medical record. 10. Click the Download Summary menu link to download a portable copy of your medical information. If you have questions, please visit the Frequently Asked Questions section of the [employer] website. Remember, [employer] is NOT to be used for urgent needs. For medical emergencies, dial 911. Now available from your iPhone and Android! Please provide this summary of care documentation to your next provider. Your primary care clinician is listed as Jasen Mcallister. If you have any questions after today's visit, please call [phone].
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
## 2017-08-02 ENCOUNTER — OFFICE VISIT (OUTPATIENT)
Dept: INTERNAL MEDICINE CLINIC | Age: 72
End: 2017-08-02

## 2017-08-02 ENCOUNTER — HOSPITAL ENCOUNTER (OUTPATIENT)
Dept: LAB | Age: 72
Discharge: HOME | End: 2017-08-02
Payer: MEDICARE

## 2017-08-02 VITALS
HEIGHT: 71 IN | RESPIRATION RATE: 12 BRPM | BODY MASS INDEX: 27.8 KG/M2 | HEART RATE: 80 BPM | WEIGHT: 198.6 LBS | SYSTOLIC BLOOD PRESSURE: 160 MMHG | TEMPERATURE: 98.9 F | DIASTOLIC BLOOD PRESSURE: 97 MMHG

## 2017-08-02 DIAGNOSIS — M54.50 CHRONIC BILATERAL LOW BACK PAIN WITHOUT SCIATICA: ICD-10-CM

## 2017-08-02 DIAGNOSIS — G89.29 CHRONIC LOW BACK PAIN WITH SCIATICA, SCIATICA LATERALITY UNSPECIFIED, UNSPECIFIED BACK PAIN LATERALITY: ICD-10-CM

## 2017-08-02 DIAGNOSIS — M54.40 CHRONIC LOW BACK PAIN WITH SCIATICA, SCIATICA LATERALITY UNSPECIFIED, UNSPECIFIED BACK PAIN LATERALITY: ICD-10-CM

## 2017-08-02 DIAGNOSIS — Z13.31 SCREENING FOR DEPRESSION: ICD-10-CM

## 2017-08-02 DIAGNOSIS — Z13.39 SCREENING FOR ALCOHOLISM: ICD-10-CM

## 2017-08-02 DIAGNOSIS — Z11.59 ENCOUNTER FOR HEPATITIS C SCREENING TEST FOR LOW RISK PATIENT: ICD-10-CM

## 2017-08-02 DIAGNOSIS — Z00.00 MEDICARE ANNUAL WELLNESS VISIT, INITIAL: Primary | ICD-10-CM

## 2017-08-02 DIAGNOSIS — Z71.89 ADVANCED CARE PLANNING/COUNSELING DISCUSSION: ICD-10-CM

## 2017-08-02 DIAGNOSIS — E78.5 HYPERLIPIDEMIA WITH TARGET LDL LESS THAN 100: ICD-10-CM

## 2017-08-02 DIAGNOSIS — Z00.00 ROUTINE GENERAL MEDICAL EXAMINATION AT A HEALTH CARE FACILITY: ICD-10-CM

## 2017-08-02 DIAGNOSIS — I10 ESSENTIAL HYPERTENSION WITH GOAL BLOOD PRESSURE LESS THAN 130/80: ICD-10-CM

## 2017-08-02 DIAGNOSIS — G89.29 CHRONIC BILATERAL LOW BACK PAIN WITHOUT SCIATICA: ICD-10-CM

## 2017-08-02 DIAGNOSIS — I10 ESSENTIAL HYPERTENSION: ICD-10-CM

## 2017-08-02 DIAGNOSIS — Z12.11 ENCOUNTER FOR SCREENING FECAL OCCULT BLOOD TESTING: ICD-10-CM

## 2017-08-02 PROCEDURE — 80053 COMPREHEN METABOLIC PANEL: CPT

## 2017-08-02 PROCEDURE — 36415 COLL VENOUS BLD VENIPUNCTURE: CPT

## 2017-08-02 PROCEDURE — 86803 HEPATITIS C AB TEST: CPT

## 2017-08-02 PROCEDURE — 80061 LIPID PANEL: CPT

## 2017-08-02 RX ORDER — NALOXONE HYDROCHLORIDE 4 MG/.1ML
SPRAY NASAL
Qty: 1 INHALATION | Refills: 0 | Status: SHIPPED | OUTPATIENT
Start: 2017-08-02 | End: 2018-05-23 | Stop reason: ALTCHOICE

## 2017-08-02 RX ORDER — OXYCODONE HYDROCHLORIDE 30 MG/1
30 TABLET ORAL
Qty: 90 TAB | Refills: 0 | Status: SHIPPED | OUTPATIENT
Start: 2017-08-02 | End: 2017-09-27 | Stop reason: SDUPTHER

## 2017-08-02 RX ORDER — CLONIDINE HYDROCHLORIDE 0.1 MG/1
0.1 TABLET ORAL
Qty: 30 TAB | Refills: 5 | Status: SHIPPED | OUTPATIENT
Start: 2017-08-02 | End: 2018-05-23 | Stop reason: SDUPTHER

## 2017-08-02 RX ORDER — OXYCODONE HYDROCHLORIDE 80 MG/1
160 TABLET, FILM COATED, EXTENDED RELEASE ORAL EVERY 12 HOURS
Qty: 120 TAB | Refills: 0 | Status: SHIPPED | OUTPATIENT
Start: 2017-08-02 | End: 2017-09-27 | Stop reason: SDUPTHER

## 2017-08-02 RX ORDER — AMLODIPINE BESYLATE 10 MG/1
10 TABLET ORAL DAILY
Qty: 30 TAB | Refills: 5 | Status: SHIPPED | OUTPATIENT
Start: 2017-08-02 | End: 2018-05-23 | Stop reason: SDUPTHER

## 2017-08-02 RX ORDER — LISINOPRIL AND HYDROCHLOROTHIAZIDE 20; 25 MG/1; MG/1
TABLET ORAL
Qty: 60 TAB | Refills: 5 | Status: SHIPPED | OUTPATIENT
Start: 2017-08-02 | End: 2018-05-23 | Stop reason: ALTCHOICE

## 2017-08-02 NOTE — MR AVS SNAPSHOT
Visit Information Date & Time Provider Department Dept. Phone Encounter #  
 8/2/2017  2:15 PM Sirena Antoine MD [facility] [address] [id number] Upcoming Health Maintenance Date Due Hepatitis C Screening 1945 ZOSTER VACCINE AGE 60> 10/1/2007 MEDICARE YEARLY EXAM 12/1/2012 Pneumococcal 65+ Low/Medium Risk (2 of 2 - PCV13) 8/12/2014 GLAUCOMA SCREENING Q2Y 7/6/2017 COLONOSCOPY 1/1/2018 INFLUENZA AGE 9 TO ADULT 10/2/2017* DTaP/Tdap/Td series (2 - Td) 8/12/2023 *Topic was postponed. The date shown is not the original due date. Allergies as of 8/2/2017  Review Complete On: 8/2/2017 By: Sirena Antoine MD  
  
 Severity Noted Reaction Type Reactions Amlodipine  02/07/2013    Nausea Only Wellbutrin [Bupropion Hcl]  01/30/2014    Other (comments) \"Stomach pressure\" Current Immunizations  Reviewed on 8/2/2017 Name Date Pneumococcal Polysaccharide (PPSV-23) 8/12/2013 Tdap 8/12/2013 Reviewed by Sirena Antoine MD on 8/2/2017 at  2:21 PM  
You Were Diagnosed With   
  
 Codes Comments Medicare annual wellness visit, initial    -  Primary ICD-10-CM: Z00.00 ICD-9-CM: V70.0 Hyperlipidemia with target LDL less than 100     ICD-10-CM: E78.5 ICD-9-CM: 272.4 Essential hypertension     ICD-10-CM: I10 
ICD-9-CM: 401.9 Encounter for hepatitis C screening test for low risk patient     ICD-10-CM: Z11.59 
ICD-9-CM: V73.89 Chronic low back pain with sciatica, sciatica laterality unspecified, unspecified back pain laterality     ICD-10-CM: M54.40, G89.29 ICD-9-CM: 724.2, 724.3, 338.29 Chronic bilateral low back pain without sciatica     ICD-10-CM: M54.5, G89.29 ICD-9-CM: 724.2, 338.29 Encounter for screening fecal occult blood testing     ICD-10-CM: Z12.11 ICD-9-CM: V76.51 Vitals BP Pulse Temp Resp Height(growth percentile) Weight(growth percentile) (!) 160/97 (BP 1 Location: Left arm, BP Patient Position: Sitting) 80 98.9 °F (37.2 °C) (Oral) 12 5' 11\" (1.803 m) 198 lb 9.6 oz (90.1 kg) BMI Smoking Status 27.7 kg/m2 Never Smoker Vitals History BMI and BSA Data Body Mass Index Body Surface Area  
 27.7 kg/m 2 2.12 m 2 Preferred Pharmacy Pharmacy Name Phone [facility] [address] [place] [facility] [phone] Your Updated Medication List  
  
   
This list is accurate as of: 8/2/17  2:31 PM.  Always use your most recent med list. amLODIPine 10 mg tablet Commonly known as:  Jeannie Arellano Take 1 Tab by mouth daily. For blood pressure- increased dose 7//14/16  Indications: Hypertension  
  
 augmented betamethasone dipropionate 0.05 % topical cream  
Commonly known as:  [place] Apply  to affected area two (2) times a day. cloNIDine HCl 0.1 mg tablet Commonly known as:  CATAPRES Take 1 Tab by mouth nightly. halobetasol 0.05 % topical cream  
Commonly known as:  Isabella Gayer Apply  to affected area two (2) times a day. use thin layer  
  
 lisinopril-hydroCHLOROthiazide 20-25 mg per tablet Commonly known as:  Leonetta Seeds Take 1 pill TWICE daily  
  
 naloxone 1 mg/mL injection Commonly known as:  NARCAN  
1 mL by IntraMUSCular route once as needed for up to 1 dose. Indications: OPIOID TOXICITY  
  
 omeprazole 20 mg capsule Commonly known as:  PRILOSEC Take 1 Cap by mouth daily. For stomach acid - replaces ranitidine * oxyCODONE ER 80 mg ER tablet Commonly known as:  OxyCONTIN Take 2 Tabs by mouth every twelve (12) hours. Max Daily Amount: 320 mg. Fill 9/5/17 * oxyCODONE ER 80 mg ER tablet Commonly known as:  OxyCONTIN Take 2 Tabs by mouth every twelve (12) hours. Max Daily Amount: 320 mg. Fill 8/6/17 * oxyCODONE IR 30 mg immediate release tablet Commonly known as:  OXY-IR  
 Take 1 Tab by mouth every eight (8) hours as needed for Pain. Max Daily Amount: 90 mg. For breakthrough pain. Fill 17 * oxyCODONE IR 30 mg immediate release tablet Commonly known as:  OXY-IR Take 1 Tab by mouth every eight (8) hours as needed for Pain. Max Daily Amount: 90 mg. For breakthrough pain. Fill 17  
  
 pneumococcal 13 jeronimo conj dip 0.5 mL Syrg injection Commonly known as:  PREVNAR-13  
0.5 mL by IntraMUSCular route once for 1 dose. * Notice: This list has 4 medication(s) that are the same as other medications prescribed for you. Read the directions carefully, and ask your doctor or other care provider to review them with you. Prescriptions Printed Refills  
 pneumococcal 13 jeronimo conj dip (PREVNAR-13) 0.5 mL syrg injection 0 Si.5 mL by IntraMUSCular route once for 1 dose. Class: Print Route: IntraMUSCular  
 oxyCODONE ER (OXYCONTIN) 80 mg ER tablet 0 Sig: Take 2 Tabs by mouth every twelve (12) hours. Max Daily Amount: 320 mg. Fill 17 Class: Print Route: Oral  
 oxyCODONE ER (OXYCONTIN) 80 mg ER tablet 0 Sig: Take 2 Tabs by mouth every twelve (12) hours. Max Daily Amount: 320 mg. Fill 17 Class: Print Route: Oral  
 oxyCODONE IR (OXY-IR) 30 mg immediate release tablet 0 Sig: Take 1 Tab by mouth every eight (8) hours as needed for Pain. Max Daily Amount: 90 mg. For breakthrough pain. Fill 17 Class: Print Route: Oral  
 oxyCODONE IR (OXY-IR) 30 mg immediate release tablet 0 Sig: Take 1 Tab by mouth every eight (8) hours as needed for Pain. Max Daily Amount: 90 mg. For breakthrough pain. Fill 17 Class: Print Route: Oral  
  
We Performed the Following HCV AB W/RFLX TO YAJAIRA [09494 CPT(R)] LIPID PANEL [84650 CPT(R)] METABOLIC PANEL, COMPREHENSIVE [86416 CPT(R)] OCCULT BLOOD, IMMUNOASSAY (FIT) [id number] CPT(R)] Introducing [facility]! [employer] introduces [employer] patient portal. Now you can access parts of your medical record, email your doctor's office, and request medication refills online. 1. In your internet browser, go to [email or web address] 2. Click on the First Time User? Click Here link in the Sign In box. You will see the New Member Sign Up page. 3. Enter your View[employer] Access Code exactly as it appears below. You will not need to use this code after youve completed the sign-up process. If you do not sign up before the expiration date, you must request a new code. · View[employer] Access Code: [email or web address] Expires: 10/31/2017  2:31 PM 
 
4. Enter the last four digits of your Social Security Number (xxxx) and Date of Birth (mm/dd/yyyy) as indicated and click Submit. You will be taken to the next sign-up page. 5. Create a Viewster ID. This will be your Viewster login ID and cannot be changed, so think of one that is secure and easy to remember. 6. Create a View[employer] password. You can change your password at any time. 7. Enter your Password Reset Question and Answer. This can be used at a later time if you forget your password. 8. Enter your e-mail address. You will receive e-mail notification when new information is available in [address] 9. Click Sign Up. You can now view and download portions of your medical record. 10. Click the Download Summary menu link to download a portable copy of your medical information. If you have questions, please visit the Frequently Asked Questions section of the Viewster website. Remember, View[employer] is NOT to be used for urgent needs. For medical emergencies, dial 911. Now available from your iPhone and Android! Please provide this summary of care documentation to your next provider. Your primary care clinician is listed as Tobi Osuna. If you have any questions after today's visit, please call [phone].

## 2017-08-03 PROBLEM — Z71.89 ADVANCED CARE PLANNING/COUNSELING DISCUSSION: Status: ACTIVE | Noted: 2017-08-03

## 2017-08-03 LAB
ALBUMIN SERPL-MCNC: 4 G/DL (ref 3.6–4.8)
ALBUMIN/GLOB SERPL: 1.3 {RATIO} (ref 1.2–2.2)
ALP SERPL-CCNC: 63 IU/L (ref 39–117)
ALT SERPL-CCNC: 18 IU/L (ref 0–44)
AST SERPL-CCNC: 21 IU/L (ref 0–40)
BILIRUB SERPL-MCNC: 0.4 MG/DL (ref 0–1.2)
BUN SERPL-MCNC: 16 MG/DL (ref 8–27)
BUN/CREAT SERPL: 13 (ref 10–24)
CALCIUM SERPL-MCNC: 9.3 MG/DL (ref 8.6–10.2)
CHLORIDE SERPL-SCNC: 100 MMOL/L (ref 96–106)
CHOLEST SERPL-MCNC: 169 MG/DL (ref 100–199)
CO2 SERPL-SCNC: 27 MMOL/L (ref 18–29)
CREAT SERPL-MCNC: 1.22 MG/DL (ref 0.76–1.27)
GLOBULIN SER CALC-MCNC: 3.1 G/DL (ref 1.5–4.5)
GLUCOSE SERPL-MCNC: 102 MG/DL (ref 65–99)
HCV AB S/CO SERPL IA: <0.1 S/CO RATIO (ref 0–0.9)
HCV AB SERPL QL IA: NORMAL
HDLC SERPL-MCNC: 60 MG/DL
INTERPRETATION, 910389: NORMAL
LDLC SERPL CALC-MCNC: 75 MG/DL (ref 0–99)
POTASSIUM SERPL-SCNC: 3.9 MMOL/L (ref 3.5–5.2)
PROT SERPL-MCNC: 7.1 G/DL (ref 6–8.5)
SODIUM SERPL-SCNC: 142 MMOL/L (ref 134–144)
TRIGL SERPL-MCNC: 172 MG/DL (ref 0–149)
VLDLC SERPL CALC-MCNC: 34 MG/DL (ref 5–40)
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
## 2017-08-04 NOTE — PATIENT INSTRUCTIONS
Medicare Part B Preventive Services Guidelines/Limitations Date last completed and Frequency Due Date   Cardiovascular Screening Blood Tests (every 5 years)  Total cholesterol, HDL, Triglycerides Order as a panel if possible Completed 1/2017    As recommended by your PCP As recommended by your PCP or Specialist   Colorectal Cancer Screening  -Fecal occult blood test (annual)  -Flexible sigmoidoscopy (5y)  -Screening colonoscopy (10y)  -Barium Enema Age 49-80; After age [de-identified] if history of abnormal results Completed 2007 or 2008     Recommended follow-up in 10 years As recommended by your PCP or Specialist     Counseling to Prevent Tobacco Use (up to 8 sessions per year)  - Counseling greater than 3 and up to 10 minutes  - Counseling greater than 10 minutes Patients must be asymptomatic of tobacco-related conditions to receive as preventive service N/A N/A   Diabetes Screening Tests (at least every 3 years, Medicare covers annually or at 6-month intervals for prediabetic patients)    Fasting blood sugar (FBS) or glucose tolerance test (GTT) Patient must be diagnosed with one of the following:  -Hypertension, Dyslipidemia, obesity, previous impaired FBS or GTT  Or any two of the following: overweight, FH of diabetes, age ? 72, history of gestational diabetes, birth of baby weighing more than 9 pounds Completed 1/2017    Recommended every 3 years for non-diabetics     As recommended by your PCP or Specialist     Glaucoma Screening (no USPSTF recommendation) Diabetes mellitus, family history, , age 48 or over,  American, age 72 or over Completed about 10 years ago    Recommended annually As recommended by your PCP or Specialist   Prostate Cancer Screening (annually up to age 76)  - Digital rectal exam (NENA)  - Prostate specific antigen (PSA) Annually (age 48 or over), NENA not paid separately when covered E/M service is provided on same date Completed 8/2013    Recommended annually to age 76 As recommended by your PCP or Specialist     Seasonal Influenza Vaccination (annually)  Completed Fall 2016    Recommended Annually Due Fall 2017   TDAP Vaccination  Completed 8/2013    Recommended every 10 years As recommended by your PCP or Specialist   Zoster (Shingles) Vaccination Covered by Medicare Part D through the pharmacy- PCP provides prescription Never received    Recommended once over age 48  As recommended by your PCP or Specialist   Pneumococcal Vaccination (once after 72)  Pneumo 23- 8/2013  Recommended once over the age of 72    Prevnar 15-  Recommended once over the age of 72 Complete        You are due for a Prevnar 13 vaccine. You can get this at your local pharmacy with a prescription from your PCP. If you think you have already received this vaccine, please notify our office of the administration date. Ultrasound Screening for Abdominal Aortic Aneurysm (AAA) (once) Patient must be referred through IPPE and not have had a screening for abdominal aortic aneurysm before under Medicare. Limited to patients who meet one of the following criteria:  - Men who are 73-68 years old and have smoked more than 100 cigarettes in their lifetime.  -Anyone with a FH of AAA  -Anyone recommended for screening by USPSTF Not indicated unless recommended by PCP   Not indicated unless recommended by PCP     Family Practice Management 2011    Please bring a copy of your completed advance medical directive to the office so it may be added to your medical record. Thank you. If you have any questions or concerns please feel free to contact me at [phone]. It was a pleasure meeting you today and participating in your healthcare.   Macrina Morrow RN
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
## 2017-09-05 ENCOUNTER — HOSPITAL ENCOUNTER (EMERGENCY)
Age: 72
Discharge: HOME | End: 2017-09-05
Attending: STUDENT IN AN ORGANIZED HEALTH CARE EDUCATION/TRAINING PROGRAM
Payer: MEDICARE

## 2017-09-05 VITALS
TEMPERATURE: 98.7 F | RESPIRATION RATE: 16 BRPM | WEIGHT: 192 LBS | OXYGEN SATURATION: 96 % | HEIGHT: 71 IN | DIASTOLIC BLOOD PRESSURE: 103 MMHG | SYSTOLIC BLOOD PRESSURE: 155 MMHG | HEART RATE: 85 BPM | BODY MASS INDEX: 26.88 KG/M2

## 2017-09-05 DIAGNOSIS — B86 SCABIES: Primary | ICD-10-CM

## 2017-09-05 PROCEDURE — 99282 EMERGENCY DEPT VISIT SF MDM: CPT

## 2017-09-05 RX ORDER — PERMETHRIN 50 MG/G
CREAM TOPICAL
Qty: 60 G | Refills: 0 | Status: SHIPPED | OUTPATIENT
Start: 2017-09-05 | End: 2017-10-05

## 2017-09-05 NOTE — DISCHARGE INSTRUCTIONS
Scabies: Care Instructions  Your Care Instructions  Scabies is a skin problem that can cause intense itching. It is caused by very tiny bugs called mites that dig just under the skin and lay eggs. An allergic reaction to the mites causes the itching. Scabies is usually spread by person-to-person contact. It is also possible, but not common, for scabies to spread through towels, clothes, and bedding. Everyone in your household should be treated. Scabies is treated with medicine. Itching may last for several weeks after treatment. Follow-up care is a key part of your treatment and safety. Be sure to make and go to all appointments, and call your doctor if you are having problems. It's also a good idea to know your test results and keep a list of the medicines you take. How can you care for yourself at home? · Use the lotion or cream your doctor recommends or prescribes. One treatment usually cures scabies. Do not use the cream again unless your doctor tells you to. · Wash all clothes, bedding, and towels that you used in the 3 days before you started treatment. Use hot water, and use the hot cycle in the dryer. Another option is to dry-clean these items. Or seal them in a plastic bag for 3 to 7 days. · Take an oral antihistamine, such as loratadine (Claritin) or diphenhydramine (Benadryl), to help stop itching. You also can use a nonprescription anti-itch cream. Read and follow all instructions on the label. · Do not have physical contact with other people or let anyone use your personal items until you have finished treatment. Do not use other people's personal items until your treatment is done. Tell people with whom you have close contact that they will need treatment if they have symptoms. · Take an oatmeal bath to help relieve itching. Add a handful of oatmeal (ground to a powder) to your bath. Or you can try an oatmeal bath product, such as Aveeno. When should you call for help?   Call your doctor now or seek immediate medical care if:  · You have signs of infection, such as:  ¨ Increased pain, swelling, warmth, or redness. ¨ Red streaks leading from the mite bites. ¨ Pus draining from a bite area. ¨ A fever. Watch closely for changes in your health, and be sure to contact your doctor if:  · Anyone else in your family has itching. · You do not get better within 2 weeks. Where can you learn more? Go to http://haile-zelda.info/. Enter R115 in the search box to learn more about \"Scabies: Care Instructions. \"  Current as of: October 13, 2016  Content Version: 11.3  © 2574-0617 [employer]. Care instructions adapted under license by [employer] (which disclaims liability or warranty for this information). If you have questions about a medical condition or this instruction, always ask your healthcare professional. [place] [address] any warranty or liability for your use of this information. We hope that we have addressed all of your medical concerns. The examination and treatment you received in the Emergency Department were for an emergent problem and were not intended as complete care. It is important that you follow up with your healthcare provider(s) for ongoing care. If your symptoms worsen or do not improve as expected, and you are unable to reach your usual health care provider(s), you should return to the Emergency Department. Today's healthcare is undergoing tremendous change, and patient satisfaction surveys are one of the many tools to assess the quality of medical care. You may receive a survey from the [employer] organization regarding your experience in the Emergency Department. I hope that your experience has been completely positive, particularly the medical care that I provided. As such, please participate in the survey; anything less than excellent does not meet my expectations or intentions.         [facility] and [facility] participate in nationally recognized quality of care measures. If your blood pressure is greater than 120/80, as reported below, we urge that you seek medical care to address the potential of high blood pressure, commonly known as hypertension. Hypertension can be hereditary or can be caused by certain medical conditions, pain, stress, or \"white coat syndrome. \"       Please make an appointment with your health care provider(s) for follow up of your Emergency Department visit. VITALS:   Patient Vitals for the past 8 hrs:   Temp Pulse Resp BP SpO2   09/05/17 1725 98.7 °F (37.1 °C) 93 18 (!) 169/98 96 %          Thank you for allowing us to provide you with medical care today. We realize that you have many choices for your emergency care needs. Please choose us in the future for any continued health care needs. Arthur Orozco, [address]: [phone]            No results found for this or any previous visit (from the past 24 hour(s)). No results found.
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
## 2017-09-12 ENCOUNTER — TELEPHONE (OUTPATIENT)
Dept: INTERNAL MEDICINE CLINIC | Age: 72
End: 2017-09-12

## 2017-09-12 NOTE — TELEPHONE ENCOUNTER
Dr Swartz Push office called in to advise Dr Bonnie Delgado that pt has canceled his appointment for hernia with Dr Jamar Yeager and did not reschedule

## 2017-09-27 ENCOUNTER — OFFICE VISIT (OUTPATIENT)
Dept: INTERNAL MEDICINE CLINIC | Age: 72
End: 2017-09-27

## 2017-09-27 ENCOUNTER — HOSPITAL ENCOUNTER (OUTPATIENT)
Dept: LAB | Age: 72
Discharge: HOME | End: 2017-09-27
Payer: MEDICARE

## 2017-09-27 VITALS
SYSTOLIC BLOOD PRESSURE: 125 MMHG | WEIGHT: 194.6 LBS | HEIGHT: 71 IN | HEART RATE: 72 BPM | DIASTOLIC BLOOD PRESSURE: 75 MMHG | TEMPERATURE: 98.3 F | RESPIRATION RATE: 12 BRPM | BODY MASS INDEX: 27.24 KG/M2

## 2017-09-27 DIAGNOSIS — Z51.81 MEDICATION MONITORING ENCOUNTER: ICD-10-CM

## 2017-09-27 DIAGNOSIS — M54.50 LOW BACK PAIN RADIATING TO BOTH LEGS: ICD-10-CM

## 2017-09-27 DIAGNOSIS — M79.605 LOW BACK PAIN RADIATING TO BOTH LEGS: ICD-10-CM

## 2017-09-27 DIAGNOSIS — I10 ESSENTIAL HYPERTENSION: ICD-10-CM

## 2017-09-27 DIAGNOSIS — G99.2 STENOSIS OF CERVICAL SPINE WITH MYELOPATHY (HCC): ICD-10-CM

## 2017-09-27 DIAGNOSIS — M48.02 STENOSIS OF CERVICAL SPINE WITH MYELOPATHY (HCC): ICD-10-CM

## 2017-09-27 DIAGNOSIS — G89.4 CHRONIC PAIN SYNDROME: Primary | ICD-10-CM

## 2017-09-27 DIAGNOSIS — M79.604 LOW BACK PAIN RADIATING TO BOTH LEGS: ICD-10-CM

## 2017-09-27 PROCEDURE — 82570 ASSAY OF URINE CREATININE: CPT

## 2017-09-27 RX ORDER — OXYCODONE HYDROCHLORIDE 30 MG/1
30 TABLET ORAL
Qty: 90 TAB | Refills: 0 | Status: SHIPPED | OUTPATIENT
Start: 2017-09-27 | End: 2017-11-29 | Stop reason: SDUPTHER

## 2017-09-27 RX ORDER — OXYCODONE HYDROCHLORIDE 80 MG/1
160 TABLET, FILM COATED, EXTENDED RELEASE ORAL EVERY 12 HOURS
Qty: 120 TAB | Refills: 0 | Status: SHIPPED | OUTPATIENT
Start: 2017-09-27 | End: 2017-11-29 | Stop reason: SDUPTHER

## 2017-09-27 RX ORDER — PERMETHRIN 50 MG/G
CREAM TOPICAL ONCE
Qty: 60 G | Refills: 0 | Status: SHIPPED | OUTPATIENT
Start: 2017-09-27 | End: 2017-09-27

## 2017-09-27 NOTE — MR AVS SNAPSHOT
Visit Information Date & Time Provider Department Dept. Phone Encounter #  
 9/27/2017  2:15 PM Mayi Berman MD [facility] [address] [id number] Upcoming Health Maintenance Date Due ZOSTER VACCINE AGE 60> 10/1/2007 Pneumococcal 65+ Low/Medium Risk (2 of 2 - PCV13) 8/12/2014 GLAUCOMA SCREENING Q2Y 7/6/2017 COLONOSCOPY 1/1/2018 INFLUENZA AGE 9 TO ADULT 10/2/2017* MEDICARE YEARLY EXAM 8/3/2018 DTaP/Tdap/Td series (2 - Td) 8/12/2023 *Topic was postponed. The date shown is not the original due date. Allergies as of 9/27/2017  Review Complete On: 9/27/2017 By: Cammy Gil Severity Noted Reaction Type Reactions Amlodipine  02/07/2013    Nausea Only Wellbutrin [Bupropion Hcl]  01/30/2014    Other (comments) \"Stomach pressure\" Current Immunizations  Reviewed on 8/2/2017 Name Date Influenza Vaccine 10/15/2016 Pneumococcal Polysaccharide (PPSV-23) 8/12/2013 Tdap 8/12/2013 Not reviewed this visit You Were Diagnosed With   
  
 Codes Comments Chronic pain syndrome    -  Primary ICD-10-CM: G89.4 ICD-9-CM: 338.4 Low back pain radiating to both legs     ICD-10-CM: M54.5 ICD-9-CM: 724.2 Stenosis of cervical spine with myelopathy     ICD-10-CM: M47.12 
ICD-9-CM: 721.1 Medication monitoring encounter     ICD-10-CM: Z51.81 
ICD-9-CM: V58.83 Essential hypertension     ICD-10-CM: I10 
ICD-9-CM: 401.9 Vitals BP Pulse Temp Resp Height(growth percentile) Weight(growth percentile) 125/75 (BP 1 Location: Left arm, BP Patient Position: Sitting) 72 98.3 °F (36.8 °C) (Oral) 12 5' 11\" (1.803 m) 194 lb 9.6 oz (88.3 kg) BMI Smoking Status 27.14 kg/m2 Never Smoker Vitals History BMI and BSA Data Body Mass Index Body Surface Area  
 27.14 kg/m 2 2.1 m 2 Preferred Pharmacy Pharmacy Name Phone [facility] [address] [place] AT [facility] [phone] Your Updated Medication List  
  
   
This list is accurate as of: 9/27/17  3:06 PM.  Always use your most recent med list. amLODIPine 10 mg tablet Commonly known as:  Jenelle Reed Take 1 Tab by mouth daily. For blood pressure- increased dose 7//14/16  Indications: hypertension  
  
 augmented betamethasone dipropionate 0.05 % topical cream  
Commonly known as:  Michele Rachelle Apply  to affected area two (2) times a day. cloNIDine HCl 0.1 mg tablet Commonly known as:  CATAPRES Take 1 Tab by mouth nightly. halobetasol 0.05 % topical cream  
Commonly known as:  Arrie Mosqueda Apply  to affected area two (2) times a day. use thin layer  
  
 lisinopril-hydroCHLOROthiazide 20-25 mg per tablet Commonly known as:  Marlaine Karla Take 1 pill TWICE daily  
  
 naloxone 4 mg/actuation nasal spray Commonly known as:  [employer] Use 1 spray intranasally into 1 nostril. Use a new Narcan nasal spray for subsequent doses and administer into alternating nostrils. May repeat every 2 to 3 minutes as needed. Indications: OPIOID TOXICITY  
  
 omeprazole 20 mg capsule Commonly known as:  PRILOSEC Take 1 Cap by mouth daily. For stomach acid - replaces ranitidine * oxyCODONE ER 80 mg ER tablet Commonly known as:  OxyCONTIN Take 2 Tabs by mouth every twelve (12) hours. Max Daily Amount: 320 mg. Fill 11/3/17 * oxyCODONE ER 80 mg ER tablet Commonly known as:  OxyCONTIN Take 2 Tabs by mouth every twelve (12) hours. Max Daily Amount: 320 mg. Fill 10/4/17 * oxyCODONE IR 30 mg immediate release tablet Commonly known as:  Yonas Caesar Take 1 Tab by mouth every eight (8) hours as needed for Pain. Max Daily Amount: 90 mg. For breakthrough pain. Fill 11/3/17 * oxyCODONE IR 30 mg immediate release tablet Commonly known as:  Yonas Caesar  
 Take 1 Tab by mouth every eight (8) hours as needed for Pain. Max Daily Amount: 90 mg. For breakthrough pain. Fill 10/4/17 * permethrin 5 % topical cream  
Commonly known as:  ACTICIN Apply half of the tube to all areas of the body from the neck down and wash off after 8 to 14 hours. Repeat in one week. * permethrin 5 % topical cream  
Commonly known as:  ACTICIN Apply  to affected area once for 1 dose. * Notice: This list has 6 medication(s) that are the same as other medications prescribed for you. Read the directions carefully, and ask your doctor or other care provider to review them with you. Prescriptions Printed Refills  
 permethrin (ACTICIN) 5 % topical cream 0 Sig: Apply  to affected area once for 1 dose. Class: Print Route: Topical  
 oxyCODONE ER (OXYCONTIN) 80 mg ER tablet 0 Sig: Take 2 Tabs by mouth every twelve (12) hours. Max Daily Amount: 320 mg. Fill 11/3/17 Class: Print Route: Oral  
 oxyCODONE ER (OXYCONTIN) 80 mg ER tablet 0 Sig: Take 2 Tabs by mouth every twelve (12) hours. Max Daily Amount: 320 mg. Fill 10/4/17 Class: Print Route: Oral  
 oxyCODONE IR (ROXICODONE) 30 mg immediate release tablet 0 Sig: Take 1 Tab by mouth every eight (8) hours as needed for Pain. Max Daily Amount: 90 mg. For breakthrough pain. Fill 11/3/17 Class: Print Route: Oral  
 oxyCODONE IR (ROXICODONE) 30 mg immediate release tablet 0 Sig: Take 1 Tab by mouth every eight (8) hours as needed for Pain. Max Daily Amount: 90 mg. For breakthrough pain. Fill 10/4/17 Class: Print Route: Oral  
  
We Performed the Following [address] MONITORING [[id number] Custom] Introducing [facility]! Armando Murray introduces [employer] patient portal. Now you can access parts of your medical record, email your doctor's office, and request medication refills online.    
 
1. In your internet browser, go to [email or web address] 2. Click on the First Time User? Click Here link in the Sign In box. You will see the New Member Sign Up page. 3. Enter your [employer] Access Code exactly as it appears below. You will not need to use this code after youve completed the sign-up process. If you do not sign up before the expiration date, you must request a new code. · [employer] Access Code: [email or web address] Expires: 10/31/2017  2:31 PM 
 
4. Enter the last four digits of your Social Security Number (xxxx) and Date of Birth (mm/dd/yyyy) as indicated and click Submit. You will be taken to the next sign-up page. 5. Create a [employer] ID. This will be your [employer] login ID and cannot be changed, so think of one that is secure and easy to remember. 6. Create a [employer] password. You can change your password at any time. 7. Enter your Password Reset Question and Answer. This can be used at a later time if you forget your password. 8. Enter your e-mail address. You will receive e-mail notification when new information is available in [address] 9. Click Sign Up. You can now view and download portions of your medical record. 10. Click the Download Summary menu link to download a portable copy of your medical information. If you have questions, please visit the Frequently Asked Questions section of the [employer] website. Remember, [employer] is NOT to be used for urgent needs. For medical emergencies, dial 911. Now available from your iPhone and Android! Please provide this summary of care documentation to your next provider. Your primary care clinician is listed as [facility]. If you have any questions after today's visit, please call [phone].

## 2017-09-27 NOTE — PROGRESS NOTES
Presents for follow up. Was in ER. Had spots on his skin. Would like a refill on cream.    Declines eye exam today. Had eye exam for LI.
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
## 2017-10-04 LAB — DRUGS UR: NORMAL

## 2017-11-29 ENCOUNTER — OFFICE VISIT (OUTPATIENT)
Dept: INTERNAL MEDICINE CLINIC | Age: 72
End: 2017-11-29

## 2017-11-29 VITALS
SYSTOLIC BLOOD PRESSURE: 138 MMHG | DIASTOLIC BLOOD PRESSURE: 87 MMHG | HEIGHT: 71 IN | WEIGHT: 193.8 LBS | HEART RATE: 95 BPM | TEMPERATURE: 98.5 F | RESPIRATION RATE: 12 BRPM | BODY MASS INDEX: 27.13 KG/M2

## 2017-11-29 DIAGNOSIS — Z01.00 EYE EXAM, ROUTINE: ICD-10-CM

## 2017-11-29 DIAGNOSIS — H57.12 LEFT EYE PAIN: ICD-10-CM

## 2017-11-29 DIAGNOSIS — L30.9 DERMATITIS: Primary | ICD-10-CM

## 2017-11-29 DIAGNOSIS — G89.4 CHRONIC PAIN SYNDROME: ICD-10-CM

## 2017-11-29 DIAGNOSIS — L40.9 PSORIASIS: ICD-10-CM

## 2017-11-29 DIAGNOSIS — R11.0 NAUSEA: ICD-10-CM

## 2017-11-29 RX ORDER — OXYCODONE HYDROCHLORIDE 80 MG/1
160 TABLET, FILM COATED, EXTENDED RELEASE ORAL EVERY 12 HOURS
Qty: 120 TAB | Refills: 0 | Status: SHIPPED | OUTPATIENT
Start: 2017-11-29 | End: 2018-01-22 | Stop reason: SDUPTHER

## 2017-11-29 RX ORDER — IVERMECTIN 3 MG/1
18 TABLET ORAL
Qty: 12 TAB | Refills: 0 | Status: SHIPPED | OUTPATIENT
Start: 2017-11-29 | End: 2018-03-23

## 2017-11-29 RX ORDER — OXYCODONE HYDROCHLORIDE 30 MG/1
30 TABLET ORAL
Qty: 90 TAB | Refills: 0 | Status: SHIPPED | OUTPATIENT
Start: 2017-11-29 | End: 2018-01-22 | Stop reason: SDUPTHER

## 2017-11-29 RX ORDER — IVERMECTIN 3 MG/1
18 TABLET ORAL
Qty: 18 TAB | Refills: 0 | Status: SHIPPED | OUTPATIENT
Start: 2017-11-29 | End: 2017-11-29 | Stop reason: SDUPTHER

## 2017-11-29 NOTE — PROGRESS NOTES
Presents with continued nausea since last year. Cannot take Prilosec every day. Has a rash on his left hand, legs and back for about 3 months. Wants referral for routine eye exam.  Feels something in his left eye.
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
## 2017-12-29 RX ORDER — PERMETHRIN 50 MG/G
CREAM TOPICAL
Qty: 60 G | Refills: 0 | Status: SHIPPED | OUTPATIENT
Start: 2017-12-29 | End: 2018-03-23 | Stop reason: ALTCHOICE

## 2018-01-22 ENCOUNTER — OFFICE VISIT (OUTPATIENT)
Dept: INTERNAL MEDICINE CLINIC | Age: 73
End: 2018-01-22

## 2018-01-22 VITALS
HEART RATE: 82 BPM | DIASTOLIC BLOOD PRESSURE: 89 MMHG | RESPIRATION RATE: 12 BRPM | HEIGHT: 71 IN | BODY MASS INDEX: 27.86 KG/M2 | TEMPERATURE: 98.5 F | WEIGHT: 199 LBS | SYSTOLIC BLOOD PRESSURE: 143 MMHG

## 2018-01-22 DIAGNOSIS — L30.9 DERMATITIS: ICD-10-CM

## 2018-01-22 DIAGNOSIS — L21.9 SEBORRHEIC DERMATITIS: ICD-10-CM

## 2018-01-22 DIAGNOSIS — G89.4 CHRONIC PAIN SYNDROME: Primary | ICD-10-CM

## 2018-01-22 RX ORDER — OXYCODONE HYDROCHLORIDE 30 MG/1
30 TABLET ORAL
Qty: 90 TAB | Refills: 0 | Status: SHIPPED | OUTPATIENT
Start: 2018-01-22 | End: 2018-03-23 | Stop reason: SDUPTHER

## 2018-01-22 RX ORDER — OXYCODONE HYDROCHLORIDE 80 MG/1
160 TABLET, FILM COATED, EXTENDED RELEASE ORAL EVERY 12 HOURS
Qty: 120 TAB | Refills: 0 | Status: SHIPPED | OUTPATIENT
Start: 2018-01-22 | End: 2018-03-23 | Stop reason: SDUPTHER
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
## 2018-03-23 ENCOUNTER — OFFICE VISIT (OUTPATIENT)
Dept: INTERNAL MEDICINE CLINIC | Age: 73
End: 2018-03-23

## 2018-03-23 ENCOUNTER — HOSPITAL ENCOUNTER (OUTPATIENT)
Dept: LAB | Age: 73
Discharge: HOME | End: 2018-03-23
Payer: MEDICARE

## 2018-03-23 VITALS
TEMPERATURE: 98.7 F | HEIGHT: 71 IN | RESPIRATION RATE: 12 BRPM | DIASTOLIC BLOOD PRESSURE: 92 MMHG | HEART RATE: 85 BPM | SYSTOLIC BLOOD PRESSURE: 167 MMHG | BODY MASS INDEX: 27.72 KG/M2 | WEIGHT: 198 LBS

## 2018-03-23 DIAGNOSIS — Z51.81 MEDICATION MONITORING ENCOUNTER: ICD-10-CM

## 2018-03-23 DIAGNOSIS — G89.4 CHRONIC PAIN SYNDROME: Primary | ICD-10-CM

## 2018-03-23 DIAGNOSIS — H57.13 EYE PAIN, BILATERAL: ICD-10-CM

## 2018-03-23 DIAGNOSIS — L30.8 PSORIASIFORM DERMATITIS: ICD-10-CM

## 2018-03-23 PROCEDURE — 82570 ASSAY OF URINE CREATININE: CPT

## 2018-03-23 RX ORDER — OXYCODONE HYDROCHLORIDE 30 MG/1
30 TABLET ORAL
Qty: 90 TAB | Refills: 0 | Status: SHIPPED | OUTPATIENT
Start: 2018-03-23 | End: 2018-05-23 | Stop reason: SDUPTHER

## 2018-03-23 RX ORDER — OXYCODONE HYDROCHLORIDE 80 MG/1
160 TABLET, FILM COATED, EXTENDED RELEASE ORAL EVERY 12 HOURS
Qty: 120 TAB | Refills: 0 | Status: SHIPPED | OUTPATIENT
Start: 2018-03-23 | End: 2018-05-23 | Stop reason: SDUPTHER

## 2018-03-23 RX ORDER — TRIAMCINOLONE ACETONIDE 1 MG/G
CREAM TOPICAL
Refills: 3 | COMMUNITY
Start: 2018-03-15 | End: 2020-01-10 | Stop reason: ALTCHOICE

## 2018-03-23 RX ORDER — TOBRAMYCIN AND DEXAMETHASONE 3; 1 MG/ML; MG/ML
SUSPENSION/ DROPS OPHTHALMIC
COMMUNITY
Start: 2018-03-21 | End: 2018-05-23 | Stop reason: ALTCHOICE

## 2018-03-23 NOTE — PROGRESS NOTES
Patient states he is here for a rash that is worsening. Saw eye doctor for his eyes 10 days ago. On drops.

## 2018-03-23 NOTE — MR AVS SNAPSHOT
[address] [facility] [address] [facility] 
[phone] Patient: Beka Padron MRN: [id number] :1945 Visit Information Date & Time Provider Department Dept. Phone Encounter #  
 3/23/2018  4:00 PM Gaurav Hector MD [facility] [address] [id number] Upcoming Health Maintenance Date Due ZOSTER VACCINE AGE 60> 10/1/2007 Pneumococcal 65+ Low/Medium Risk (2 of 2 - PCV13) 2014 GLAUCOMA SCREENING Q2Y 2017 COLONOSCOPY 2018 MEDICARE YEARLY EXAM 8/3/2018 DTaP/Tdap/Td series (2 - Td) 2023 Allergies as of 3/23/2018  Review Complete On: 3/23/2018 By: Wili Urrutia Severity Noted Reaction Type Reactions Amlodipine  2013    Nausea Only Wellbutrin [Bupropion Hcl]  2014    Other (comments) \"Stomach pressure\" Current Immunizations  Reviewed on 2017 Name Date Influenza Vaccine 10/15/2016 Pneumococcal Polysaccharide (PPSV-23) 2013 Tdap 2013 Not reviewed this visit You Were Diagnosed With   
  
 Codes Comments Eye pain, bilateral    -  Primary ICD-10-CM: H57.13 ICD-9-CM: 379.91 Chronic pain syndrome     ICD-10-CM: G89.4 ICD-9-CM: 338.4 Medication monitoring encounter     ICD-10-CM: Z51.81 
ICD-9-CM: V58.83 Psoriasiform dermatitis     ICD-10-CM: L30.8 ICD-9-CM: 696.8 Vitals BP Pulse Temp Resp Height(growth percentile) Weight(growth percentile) (!) 167/92 (BP 1 Location: Left arm, BP Patient Position: Sitting) 85 98.7 °F (37.1 °C) (Oral) 12 5' 11\" (1.803 m) 198 lb (89.8 kg) BMI Smoking Status 27.62 kg/m2 Never Smoker Vitals History BMI and BSA Data Body Mass Index Body Surface Area  
 27.62 kg/m 2 2.12 m 2 Preferred Pharmacy Pharmacy Name Phone  [address] [facility] [phone] Your Updated Medication List  
  
   
This list is accurate as of 3/23/18  4:23 PM.  Always use your most recent med list. amLODIPine 10 mg tablet Commonly known as:  Christiana Jenniferff Take 1 Tab by mouth daily. For blood pressure- increased dose 7//14/16  Indications: hypertension  
  
 augmented betamethasone dipropionate 0.05 % topical cream  
Commonly known as:  Kathrin November Apply  to affected area two (2) times a day. cloNIDine HCl 0.1 mg tablet Commonly known as:  CATAPRES Take 1 Tab by mouth nightly. lisinopril-hydroCHLOROthiazide 20-25 mg per tablet Commonly known as:  Alfa Primus Take 1 pill TWICE daily  
  
 naloxone 4 mg/actuation nasal spray Commonly known as:  [employer] Use 1 spray intranasally into 1 nostril. Use a new Narcan nasal spray for subsequent doses and administer into alternating nostrils. May repeat every 2 to 3 minutes as needed. Indications: OPIOID TOXICITY  
  
 omeprazole 20 mg capsule Commonly known as:  PRILOSEC Take 1 Cap by mouth daily. For stomach acid - replaces ranitidine * oxyCODONE ER 80 mg ER tablet Commonly known as:  OxyCONTIN Take 2 Tabs by mouth every twelve (12) hours. Max Daily Amount: 320 mg. Fill 5/1/18 * oxyCODONE IR 30 mg immediate release tablet Commonly known as:  Vianney Snellen Take 1 Tab by mouth every eight (8) hours as needed for Pain. Max Daily Amount: 90 mg. For breakthrough pain. Fill 5/1/18 * oxyCODONE ER 80 mg ER tablet Commonly known as:  OxyCONTIN Take 2 Tabs by mouth every twelve (12) hours. Max Daily Amount: 320 mg. Fill 4/2/18 * oxyCODONE IR 30 mg immediate release tablet Commonly known as:  Vianney Snellen Take 1 Tab by mouth every eight (8) hours as needed for Pain. Max Daily Amount: 90 mg. For breakthrough pain. Fill 4/2/18  
  
 tobramycin-dexamethasone ophthalmic suspension Commonly known as:  Lurdes Bee triamcinolone acetonide 0.1 % topical cream  
Commonly known as:  KENALOG  
APPLY TO PSORIASIS BID * Notice: This list has 4 medication(s) that are the same as other medications prescribed for you. Read the directions carefully, and ask your doctor or other care provider to review them with you. Prescriptions Printed Refills  
 oxyCODONE ER (OXYCONTIN) 80 mg ER tablet 0 Sig: Take 2 Tabs by mouth every twelve (12) hours. Max Daily Amount: 320 mg. Fill 5/1/18 Class: Print Route: Oral  
 oxyCODONE IR (ROXICODONE) 30 mg immediate release tablet 0 Sig: Take 1 Tab by mouth every eight (8) hours as needed for Pain. Max Daily Amount: 90 mg. For breakthrough pain. Fill 5/1/18 Class: Print Route: Oral  
 oxyCODONE ER (OXYCONTIN) 80 mg ER tablet 0 Sig: Take 2 Tabs by mouth every twelve (12) hours. Max Daily Amount: 320 mg. Fill 4/2/18 Class: Print Route: Oral  
 oxyCODONE IR (ROXICODONE) 30 mg immediate release tablet 0 Sig: Take 1 Tab by mouth every eight (8) hours as needed for Pain. Max Daily Amount: 90 mg. For breakthrough pain. Fill 4/2/18 Class: Print Route: Oral  
  
We Performed the Following [address] MONITORING [[id number] Custom] Introducing [facility] SERVICES! Marcella Interiano introduces Share Your Brain patient portal. Now you can access parts of your medical record, email your doctor's office, and request medication refills online. 1. In your internet browser, go to [email or web address] 2. Click on the First Time User? Click Here link in the Sign In box. You will see the New Member Sign Up page. 3. Enter your Share Your Brain Access Code exactly as it appears below. You will not need to use this code after youve completed the sign-up process. If you do not sign up before the expiration date, you must request a new code. · Share Your Brain Access Code: [email or web address] Expires: 6/21/2018  4:23 PM 
 
 4. Enter the last four digits of your Social Security Number (xxxx) and Date of Birth (mm/dd/yyyy) as indicated and click Submit. You will be taken to the next sign-up page. 5. Create a Clifton ID. This will be your Clifton login ID and cannot be changed, so think of one that is secure and easy to remember. 6. Create a Clifton password. You can change your password at any time. 7. Enter your Password Reset Question and Answer. This can be used at a later time if you forget your password. 8. Enter your e-mail address. You will receive e-mail notification when new information is available in [address] 9. Click Sign Up. You can now view and download portions of your medical record. 10. Click the Download Summary menu link to download a portable copy of your medical information. If you have questions, please visit the Frequently Asked Questions section of the Clifton website. Remember, Clifton is NOT to be used for urgent needs. For medical emergencies, dial 911. Now available from your iPhone and Android! Please provide this summary of care documentation to your next provider. Your primary care clinician is listed as Kendrick Shelby. If you have any questions after today's visit, please call [phone].
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
## 2018-03-28 LAB — DRUGS UR: NORMAL

## 2018-05-23 ENCOUNTER — OFFICE VISIT (OUTPATIENT)
Dept: INTERNAL MEDICINE CLINIC | Age: 73
End: 2018-05-23

## 2018-05-23 VITALS
OXYGEN SATURATION: 95 % | HEART RATE: 86 BPM | WEIGHT: 202 LBS | HEIGHT: 71 IN | TEMPERATURE: 98.4 F | SYSTOLIC BLOOD PRESSURE: 163 MMHG | DIASTOLIC BLOOD PRESSURE: 101 MMHG | BODY MASS INDEX: 28.28 KG/M2 | RESPIRATION RATE: 16 BRPM

## 2018-05-23 DIAGNOSIS — L40.9 PSORIASIS: ICD-10-CM

## 2018-05-23 DIAGNOSIS — M54.40 CHRONIC LOW BACK PAIN WITH SCIATICA, SCIATICA LATERALITY UNSPECIFIED, UNSPECIFIED BACK PAIN LATERALITY: ICD-10-CM

## 2018-05-23 DIAGNOSIS — G99.2 STENOSIS OF CERVICAL SPINE WITH MYELOPATHY (HCC): ICD-10-CM

## 2018-05-23 DIAGNOSIS — I10 ESSENTIAL HYPERTENSION: ICD-10-CM

## 2018-05-23 DIAGNOSIS — G89.4 CHRONIC PAIN SYNDROME: Primary | ICD-10-CM

## 2018-05-23 DIAGNOSIS — M48.02 STENOSIS OF CERVICAL SPINE WITH MYELOPATHY (HCC): ICD-10-CM

## 2018-05-23 DIAGNOSIS — G89.29 CHRONIC LOW BACK PAIN WITH SCIATICA, SCIATICA LATERALITY UNSPECIFIED, UNSPECIFIED BACK PAIN LATERALITY: ICD-10-CM

## 2018-05-23 RX ORDER — OXYCODONE HYDROCHLORIDE 30 MG/1
30 TABLET ORAL
Qty: 90 TAB | Refills: 0 | Status: SHIPPED | OUTPATIENT
Start: 2018-05-23 | End: 2018-05-31 | Stop reason: SDUPTHER

## 2018-05-23 RX ORDER — AMLODIPINE BESYLATE 10 MG/1
10 TABLET ORAL DAILY
Qty: 90 TAB | Refills: 1 | Status: SHIPPED | OUTPATIENT
Start: 2018-05-23 | End: 2018-11-21 | Stop reason: SDUPTHER

## 2018-05-23 RX ORDER — PERMETHRIN 50 MG/G
CREAM TOPICAL
Qty: 60 G | Refills: 2 | Status: SHIPPED | OUTPATIENT
Start: 2018-05-23 | End: 2018-05-23 | Stop reason: SDUPTHER

## 2018-05-23 RX ORDER — LISINOPRIL 40 MG/1
40 TABLET ORAL DAILY
Qty: 90 TAB | Refills: 1 | Status: SHIPPED | OUTPATIENT
Start: 2018-05-23 | End: 2018-11-21 | Stop reason: ALTCHOICE

## 2018-05-23 RX ORDER — HYDROCHLOROTHIAZIDE 25 MG/1
25 TABLET ORAL DAILY
Qty: 90 TAB | Refills: 1 | Status: SHIPPED | OUTPATIENT
Start: 2018-05-23 | End: 2018-11-21 | Stop reason: ALTCHOICE

## 2018-05-23 RX ORDER — CLONIDINE HYDROCHLORIDE 0.1 MG/1
0.1 TABLET ORAL
Qty: 90 TAB | Refills: 1 | Status: SHIPPED | OUTPATIENT
Start: 2018-05-23 | End: 2018-11-21 | Stop reason: SDUPTHER

## 2018-05-23 RX ORDER — OXYCODONE HYDROCHLORIDE 30 MG/1
30 TABLET ORAL
Qty: 90 TAB | Refills: 0 | Status: SHIPPED | OUTPATIENT
Start: 2018-05-23 | End: 2018-07-26 | Stop reason: SDUPTHER

## 2018-05-23 RX ORDER — PERMETHRIN 50 MG/G
CREAM TOPICAL
Qty: 60 G | Refills: 2 | Status: SHIPPED | OUTPATIENT
Start: 2018-05-23 | End: 2019-01-25 | Stop reason: ALTCHOICE

## 2018-05-23 RX ORDER — OXYCODONE HYDROCHLORIDE 80 MG/1
160 TABLET, FILM COATED, EXTENDED RELEASE ORAL EVERY 12 HOURS
Qty: 120 TAB | Refills: 0 | Status: SHIPPED | OUTPATIENT
Start: 2018-05-23 | End: 2018-05-31 | Stop reason: SDUPTHER

## 2018-05-23 NOTE — MR AVS SNAPSHOT
[facility] 
 
 
 [address] [facility] 1007 [facility] 
[phone] Patient: Paco Pedroza MRN: [id number] :1945 Visit Information Date & Time Provider Department Dept. Phone Encounter #  
 2018  3:30 PM Wade Zepeda MD [facility] [address] [id number] Upcoming Health Maintenance Date Due ZOSTER VACCINE AGE 60> 10/1/2007 Pneumococcal 65+ Low/Medium Risk (2 of 2 - PCV13) 2014 GLAUCOMA SCREENING Q2Y 2017 COLONOSCOPY 2018 Influenza Age 5 to Adult 2018 MEDICARE YEARLY EXAM 8/3/2018 DTaP/Tdap/Td series (2 - Td) 2023 Allergies as of 2018  Review Complete On: 2018 By: Wade Zepeda MD  
  
 Severity Noted Reaction Type Reactions Amlodipine  2013    Nausea Only Wellbutrin [Bupropion Hcl]  2014    Other (comments) \"Stomach pressure\" Current Immunizations  Reviewed on 2017 Name Date Influenza Vaccine 10/15/2016 Pneumococcal Polysaccharide (PPSV-23) 2013 Tdap 2013 Not reviewed this visit You Were Diagnosed With   
  
 Codes Comments Chronic pain syndrome    -  Primary ICD-10-CM: G89.4 ICD-9-CM: 338.4 Stenosis of cervical spine with myelopathy     ICD-10-CM: M47.12 
ICD-9-CM: 721.1 Essential hypertension     ICD-10-CM: I10 
ICD-9-CM: 401.9 Chronic low back pain with sciatica, sciatica laterality unspecified, unspecified back pain laterality     ICD-10-CM: M54.40, G89.29 ICD-9-CM: 724.2, 724.3, 338.29 Psoriasis     ICD-10-CM: L40.9 ICD-9-CM: 696.1 Vitals BP Pulse Temp Resp Height(growth percentile) Weight(growth percentile) (!) 163/101 (BP 1 Location: Right arm) 86 98.4 °F (36.9 °C) (Oral) 16 5' 11\" (1.803 m) 202 lb (91.6 kg) SpO2 BMI Smoking Status 95% 28.17 kg/m2 Never Smoker Vitals History BMI and BSA Data Body Mass Index Body Surface Area 28.17 kg/m 2 2.14 m 2 Preferred Pharmacy Pharmacy Name Phone [facility] [address] [facility] [phone] Your Updated Medication List  
  
   
This list is accurate as of 5/23/18  4:47 PM.  Always use your most recent med list. amLODIPine 10 mg tablet Commonly known as:  Kindra Matar Take 1 Tab by mouth daily. For blood pressure- increased dose 7//14/16  Indications: hypertension  
  
 cloNIDine HCl 0.1 mg tablet Commonly known as:  CATAPRES Take 1 Tab by mouth nightly. hydroCHLOROthiazide 25 mg tablet Commonly known as:  HYDRODIURIL Take 1 Tab by mouth daily. lisinopril 40 mg tablet Commonly known as:  Andrew Boehringer Take 1 Tab by mouth daily. * oxyCODONE ER 80 mg ER tablet Commonly known as:  OxyCONTIN Take 2 Tabs by mouth every twelve (12) hours. Max Daily Amount: 320 mg. Fill 6/30/18 * oxyCODONE ER 80 mg ER tablet Commonly known as:  OxyCONTIN Take 2 Tabs by mouth every twelve (12) hours. Max Daily Amount: 320 mg. Fill 6/30/18 * oxyCODONE IR 30 mg immediate release tablet Commonly known as:  Binta [place] Take 1 Tab by mouth every eight (8) hours as needed for Pain. Max Daily Amount: 90 mg. For breakthrough pain. Fill 5/31/18 * oxyCODONE IR 30 mg immediate release tablet Commonly known as:  Binta [place] Take 1 Tab by mouth every eight (8) hours as needed for Pain. Max Daily Amount: 90 mg. For breakthrough pain. Fill 5/31/18 permethrin 5 % topical cream  
Commonly known as:  ACTICIN  
APPLY TO THE AFFECTED AREA ONCE FOR 1 DOSE  
  
 triamcinolone acetonide 0.1 % topical cream  
Commonly known as:  KENALOG  
APPLY TO PSORIASIS BID * Notice: This list has 4 medication(s) that are the same as other medications prescribed for you. Read the directions carefully, and ask your doctor or other care provider to review them with you. Prescriptions Printed Refills  
 oxyCODONE ER (OXYCONTIN) 80 mg ER tablet 0 Sig: Take 2 Tabs by mouth every twelve (12) hours. Max Daily Amount: 320 mg. Fill 6/30/18 Class: Print Route: Oral  
 oxyCODONE ER (OXYCONTIN) 80 mg ER tablet 0 Sig: Take 2 Tabs by mouth every twelve (12) hours. Max Daily Amount: 320 mg. Fill 6/30/18 Class: Print Route: Oral  
 oxyCODONE IR (ROXICODONE) 30 mg immediate release tablet 0 Sig: Take 1 Tab by mouth every eight (8) hours as needed for Pain. Max Daily Amount: 90 mg. For breakthrough pain. Fill 5/31/18 Class: Print Route: Oral  
 oxyCODONE IR (ROXICODONE) 30 mg immediate release tablet 0 Sig: Take 1 Tab by mouth every eight (8) hours as needed for Pain. Max Daily Amount: 90 mg. For breakthrough pain. Fill 5/31/18 Class: Print Route: Oral  
 lisinopril (PRINIVIL, ZESTRIL) 40 mg tablet 1 Sig: Take 1 Tab by mouth daily. Class: Print Route: Oral  
 hydroCHLOROthiazide (HYDRODIURIL) 25 mg tablet 1 Sig: Take 1 Tab by mouth daily. Class: Print Route: Oral  
 amLODIPine (NORVASC) 10 mg tablet 1 Sig: Take 1 Tab by mouth daily. For blood pressure- increased dose 7//14/16  Indications: hypertension Class: Print Route: Oral  
 cloNIDine HCl (CATAPRES) 0.1 mg tablet 1 Sig: Take 1 Tab by mouth nightly. Class: Print Route: Oral  
 permethrin (ACTICIN) 5 % topical cream 2 Sig: APPLY TO THE AFFECTED AREA ONCE FOR 1 DOSE Class: Print Introducing [facility] & HEALTH SERVICES! Tiffany Martin introduces [employer] patient portal. Now you can access parts of your medical record, email your doctor's office, and request medication refills online. 1. In your internet browser, go to [email or web address] 2. Click on the First Time User? Click Here link in the Sign In box. You will see the New Member Sign Up page. 3. Enter your En[employer] Access Code exactly as it appears below.  You will not need to use this code after youve completed the sign-up process. If you do not sign up before the expiration date, you must request a new code. · [employer] Access Code: [email or web address] Expires: 6/21/2018  4:23 PM 
 
4. Enter the last four digits of your Social Security Number (xxxx) and Date of Birth (mm/dd/yyyy) as indicated and click Submit. You will be taken to the next sign-up page. 5. Create a [employer] ID. This will be your [employer] login ID and cannot be changed, so think of one that is secure and easy to remember. 6. Create a [employer] password. You can change your password at any time. 7. Enter your Password Reset Question and Answer. This can be used at a later time if you forget your password. 8. Enter your e-mail address. You will receive e-mail notification when new information is available in [address]. 9. Click Sign Up. You can now view and download portions of your medical record. 10. Click the Download Summary menu link to download a portable copy of your medical information. If you have questions, please visit the Frequently Asked Questions section of the [employer] website. Remember, [employer] is NOT to be used for urgent needs. For medical emergencies, dial 911. Now available from your iPhone and Android! Please provide this summary of care documentation to your next provider. Your primary care clinician is listed as Sirena Mcgill. If you have any questions after today's visit, please call [phone].
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
## 2018-05-31 DIAGNOSIS — G89.4 CHRONIC PAIN SYNDROME: ICD-10-CM

## 2018-05-31 RX ORDER — OXYCODONE HYDROCHLORIDE 30 MG/1
30 TABLET ORAL
Qty: 90 TAB | Refills: 0 | Status: SHIPPED | OUTPATIENT
Start: 2018-05-31 | End: 2018-07-26 | Stop reason: SDUPTHER

## 2018-05-31 RX ORDER — OXYCODONE HYDROCHLORIDE 80 MG/1
160 TABLET, FILM COATED, EXTENDED RELEASE ORAL EVERY 12 HOURS
Qty: 120 TAB | Refills: 0 | Status: SHIPPED | OUTPATIENT
Start: 2018-05-31 | End: 2018-07-26 | Stop reason: SDUPTHER

## 2018-06-05 ENCOUNTER — DOCUMENTATION ONLY (OUTPATIENT)
Dept: INTERNAL MEDICINE CLINIC | Age: 73
End: 2018-06-05

## 2018-07-26 ENCOUNTER — OFFICE VISIT (OUTPATIENT)
Dept: INTERNAL MEDICINE CLINIC | Age: 73
End: 2018-07-26

## 2018-07-26 VITALS
DIASTOLIC BLOOD PRESSURE: 96 MMHG | HEART RATE: 77 BPM | RESPIRATION RATE: 18 BRPM | SYSTOLIC BLOOD PRESSURE: 176 MMHG | OXYGEN SATURATION: 94 % | BODY MASS INDEX: 27.44 KG/M2 | TEMPERATURE: 98.3 F | HEIGHT: 71 IN | WEIGHT: 196 LBS

## 2018-07-26 DIAGNOSIS — R11.0 NAUSEA: ICD-10-CM

## 2018-07-26 DIAGNOSIS — G89.4 CHRONIC PAIN SYNDROME: Primary | ICD-10-CM

## 2018-07-26 DIAGNOSIS — I10 ESSENTIAL HYPERTENSION: ICD-10-CM

## 2018-07-26 RX ORDER — OXYCODONE HYDROCHLORIDE 30 MG/1
30 TABLET ORAL
Qty: 90 TAB | Refills: 0 | Status: SHIPPED | OUTPATIENT
Start: 2018-07-26 | End: 2018-09-20 | Stop reason: HOSPADM

## 2018-07-26 RX ORDER — OXYCODONE HYDROCHLORIDE 80 MG/1
160 TABLET, FILM COATED, EXTENDED RELEASE ORAL EVERY 12 HOURS
Qty: 120 TAB | Refills: 0 | Status: SHIPPED | OUTPATIENT
Start: 2018-07-29 | End: 2018-09-20 | Stop reason: SDUPTHER

## 2018-07-26 RX ORDER — OXYCODONE HYDROCHLORIDE 80 MG/1
160 TABLET, FILM COATED, EXTENDED RELEASE ORAL EVERY 12 HOURS
Qty: 120 TAB | Refills: 0 | Status: SHIPPED | OUTPATIENT
Start: 2018-07-26 | End: 2018-09-20 | Stop reason: HOSPADM

## 2018-07-26 RX ORDER — PANTOPRAZOLE SODIUM 40 MG/1
40 TABLET, DELAYED RELEASE ORAL DAILY
Qty: 30 TAB | Refills: 4 | Status: SHIPPED | OUTPATIENT
Start: 2018-07-26 | End: 2018-09-20 | Stop reason: HOSPADM

## 2018-07-26 RX ORDER — ONDANSETRON 8 MG/1
8 TABLET, ORALLY DISINTEGRATING ORAL
Qty: 60 TAB | Refills: 2 | Status: SHIPPED | OUTPATIENT
Start: 2018-07-26 | End: 2018-09-20 | Stop reason: HOSPADM

## 2018-07-26 RX ORDER — OXYCODONE HYDROCHLORIDE 30 MG/1
30 TABLET ORAL
Qty: 90 TAB | Refills: 0 | Status: SHIPPED | OUTPATIENT
Start: 2018-07-29 | End: 2018-09-20 | Stop reason: SDUPTHER

## 2018-07-26 NOTE — MR AVS SNAPSHOT
[facility] 
 
 
 [address] [place] 100[address] [facility] 
[phone] Patient: Raphael Timmons MRN: [id number] :1945 Visit Information Date & Time Provider Department Dept. Phone Encounter #  
 2018  8:45 AM Glennda Baumgarten, MD [facility] [address] [id number] Upcoming Health Maintenance Date Due ZOSTER VACCINE AGE 60> 10/1/2007 Pneumococcal 65+ Low/Medium Risk (2 of 2 - PCV13) 2014 GLAUCOMA SCREENING Q2Y 2017 COLONOSCOPY 2018 Influenza Age 5 to Adult 2018 MEDICARE YEARLY EXAM 8/3/2018 DTaP/Tdap/Td series (2 - Td) 2023 Allergies as of 2018  Review Complete On: 2018 By: Glennda Baumgarten, MD  
  
 Severity Noted Reaction Type Reactions Amlodipine  2013    Nausea Only Wellbutrin [Bupropion Hcl]  2014    Other (comments) \"Stomach pressure\" Current Immunizations  Reviewed on 2017 Name Date Influenza Vaccine 10/15/2016 Pneumococcal Polysaccharide (PPSV-23) 2013 Tdap 2013 Not reviewed this visit You Were Diagnosed With   
  
 Codes Comments Chronic pain syndrome    -  Primary ICD-10-CM: G89.4 ICD-9-CM: 338.4 Nausea     ICD-10-CM: R11.0 ICD-9-CM: 787.02 Essential hypertension     ICD-10-CM: I10 
ICD-9-CM: 401.9 Vitals BP Pulse Temp Resp Height(growth percentile) Weight(growth percentile) (!) 176/96 (BP 1 Location: Left arm, BP Patient Position: Sitting) 77 98.3 °F (36.8 °C) (Oral) 18 5' 11\" (1.803 m) 196 lb (88.9 kg) SpO2 BMI Smoking Status 94% 27.34 kg/m2 Never Smoker Vitals History BMI and BSA Data Body Mass Index Body Surface Area  
 27.34 kg/m 2 2.11 m 2 Preferred Pharmacy Pharmacy Name Phone [facility] [place], [address] [facility] [phone] Your Updated Medication List  
 This list is accurate as of 7/26/18  9:19 AM.  Always use your most recent med list. amLODIPine 10 mg tablet Commonly known as:  Rissa Marie Take 1 Tab by mouth daily. For blood pressure- increased dose 7//14/16  Indications: hypertension  
  
 cloNIDine HCl 0.1 mg tablet Commonly known as:  CATAPRES Take 1 Tab by mouth nightly. hydroCHLOROthiazide 25 mg tablet Commonly known as:  HYDRODIURIL Take 1 Tab by mouth daily. lisinopril 40 mg tablet Commonly known as:  Kimberly [place] Take 1 Tab by mouth daily. ondansetron 8 mg disintegrating tablet Commonly known as:  ZOFRAN ODT Take 1 Tab by mouth every twelve (12) hours as needed for Nausea. * oxyCODONE IR 30 mg immediate release tablet Commonly known as:  Jessica Morrison Take 1 Tab by mouth every eight (8) hours as needed for Pain. Max Daily Amount: 90 mg. For breakthrough pain. Fill 8/27/18 * oxyCODONE ER 80 mg ER tablet Commonly known as:  OxyCONTIN Take 2 Tabs by mouth every twelve (12) hours. Max Daily Amount: 320 mg. Fill 8/27/18 * oxyCODONE ER 80 mg ER tablet Commonly known as:  OxyCONTIN Take 2 Tabs by mouth every twelve (12) hours. Max Daily Amount: 320 mg. Fill 7/29/18 Start taking on:  7/29/2018 * oxyCODONE IR 30 mg immediate release tablet Commonly known as:  Jessica Burtonel Take 1 Tab by mouth every eight (8) hours as needed for Pain. Max Daily Amount: 90 mg. For breakthrough pain. Fill 7/29/18 Start taking on:  7/29/2018  
  
 pantoprazole 40 mg tablet Commonly known as:  PROTONIX Take 1 Tab by mouth daily. Indications: for stomach acid and nausea  
  
 permethrin 5 % topical cream  
Commonly known as:  ACTICIN  
APPLY TO THE AFFECTED AREA ONCE FOR 1 DOSE  
  
 triamcinolone acetonide 0.1 % topical cream  
Commonly known as:  KENALOG  
APPLY TO PSORIASIS BID * Notice:   This list has 4 medication(s) that are the same as other medications prescribed for you. Read the directions carefully, and ask your doctor or other care provider to review them with you. Prescriptions Printed Refills  
 oxyCODONE ER (OXYCONTIN) 80 mg ER tablet 0 Starting on: 7/29/2018 Sig: Take 2 Tabs by mouth every twelve (12) hours. Max Daily Amount: 320 mg. Fill 7/29/18 Class: Print Route: Oral  
 oxyCODONE IR (ROXICODONE) 30 mg immediate release tablet 0 Starting on: 7/29/2018 Sig: Take 1 Tab by mouth every eight (8) hours as needed for Pain. Max Daily Amount: 90 mg. For breakthrough pain. Fill 7/29/18 Class: Print Route: Oral  
 oxyCODONE IR (ROXICODONE) 30 mg immediate release tablet 0 Sig: Take 1 Tab by mouth every eight (8) hours as needed for Pain. Max Daily Amount: 90 mg. For breakthrough pain. Fill 8/27/18 Class: Print Route: Oral  
 oxyCODONE ER (OXYCONTIN) 80 mg ER tablet 0 Sig: Take 2 Tabs by mouth every twelve (12) hours. Max Daily Amount: 320 mg. Fill 8/27/18 Class: Print Route: Oral  
  
Prescriptions Sent to Pharmacy Refills  
 pantoprazole (PROTONIX) 40 mg tablet 4 Sig: Take 1 Tab by mouth daily. Indications: for stomach acid and nausea Class: Normal  
 Pharmacy: [address] Ph #: [phone] Route: Oral  
 ondansetron (ZOFRAN ODT) 8 mg disintegrating tablet 2 Sig: Take 1 Tab by mouth every twelve (12) hours as needed for Nausea. Class: Normal  
 Pharmacy: [address] Ph #: [phone] Route: Oral  
  
Introducing [facility] & HEALTH SERVICES! [employer] introduces [email or web address] patient portal. Now you can access parts of your medical record, email your doctor's office, and request medication refills online. 1. In your internet browser, go to [email or web address] 2. Click on the First Time User? Click Here link in the Sign In box. You will see the New Member Sign Up page. 3. Enter your [employer] Access Code exactly as it appears below. You will not need to use this code after youve completed the sign-up process. If you do not sign up before the expiration date, you must request a new code. · [employer] Access Code: [email or web address] Expires: 10/24/2018  9:18 AM 
 
4. Enter the last four digits of your Social Security Number (xxxx) and Date of Birth (mm/dd/yyyy) as indicated and click Submit. You will be taken to the next sign-up page. 5. Create a [employer] ID. This will be your [employer] login ID and cannot be changed, so think of one that is secure and easy to remember. 6. Create a [employer] password. You can change your password at any time. 7. Enter your Password Reset Question and Answer. This can be used at a later time if you forget your password. 8. Enter your e-mail address. You will receive e-mail notification when new information is available in [address] 9. Click Sign Up. You can now view and download portions of your medical record. 10. Click the Download Summary menu link to download a portable copy of your medical information. If you have questions, please visit the Frequently Asked Questions section of the [employer] website. Remember, [employer] is NOT to be used for urgent needs. For medical emergencies, dial 911. Now available from your iPhone and Android! Please provide this summary of care documentation to your next provider. Your primary care clinician is listed as Rosalina Cooper. If you have any questions after today's visit, please call [phone].
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
## 2018-09-20 ENCOUNTER — OFFICE VISIT (OUTPATIENT)
Dept: INTERNAL MEDICINE CLINIC | Age: 73
End: 2018-09-20

## 2018-09-20 VITALS
HEIGHT: 71 IN | BODY MASS INDEX: 26.74 KG/M2 | RESPIRATION RATE: 18 BRPM | HEART RATE: 89 BPM | OXYGEN SATURATION: 96 % | SYSTOLIC BLOOD PRESSURE: 158 MMHG | WEIGHT: 191 LBS | DIASTOLIC BLOOD PRESSURE: 95 MMHG | TEMPERATURE: 98 F

## 2018-09-20 DIAGNOSIS — R11.2 NON-INTRACTABLE VOMITING WITH NAUSEA, UNSPECIFIED VOMITING TYPE: ICD-10-CM

## 2018-09-20 DIAGNOSIS — G89.4 CHRONIC PAIN SYNDROME: Primary | ICD-10-CM

## 2018-09-20 DIAGNOSIS — I10 ESSENTIAL HYPERTENSION: ICD-10-CM

## 2018-09-20 RX ORDER — OXYCODONE HYDROCHLORIDE 30 MG/1
30 TABLET ORAL
Qty: 90 TAB | Refills: 0 | Status: SHIPPED | OUTPATIENT
Start: 2018-09-20 | End: 2018-11-21 | Stop reason: SDUPTHER

## 2018-09-20 RX ORDER — PROMETHAZINE HYDROCHLORIDE 25 MG/1
25 TABLET ORAL
Qty: 30 TAB | Refills: 3 | Status: SHIPPED | OUTPATIENT
Start: 2018-09-20 | End: 2021-07-14 | Stop reason: ALTCHOICE

## 2018-09-20 RX ORDER — OXYCODONE HYDROCHLORIDE 80 MG/1
160 TABLET, FILM COATED, EXTENDED RELEASE ORAL EVERY 12 HOURS
Qty: 120 TAB | Refills: 0 | Status: SHIPPED | OUTPATIENT
Start: 2018-09-20 | End: 2018-11-21 | Stop reason: SDUPTHER

## 2018-09-20 NOTE — MR AVS SNAPSHOT
Breanne  
 
 
 [address] [facility] [place] [facility] 
[phone] Patient: Micheal Murillo MRN: [id number] :1945 Visit Information Date & Time Provider Department Dept. Phone Encounter #  
 2018  3:15 PM Naa Rendon MD [facility] [address] [id number] Upcoming Health Maintenance Date Due ZOSTER VACCINE AGE 60> 10/1/2007 Pneumococcal 65+ Low/Medium Risk (2 of 2 - PCV13) 2014 GLAUCOMA SCREENING Q2Y 2017 COLONOSCOPY 2018 Influenza Age 5 to Adult 2018 MEDICARE YEARLY EXAM 8/3/2018 DTaP/Tdap/Td series (2 - Td) 2023 Allergies as of 2018  Review Complete On: 2018 By: Naa Rendon MD  
  
 Severity Noted Reaction Type Reactions Amlodipine  2013    Nausea Only Wellbutrin [Bupropion Hcl]  2014    Other (comments) \"Stomach pressure\" Current Immunizations  Reviewed on 2017 Name Date Influenza Vaccine 10/15/2016 Pneumococcal Polysaccharide (PPSV-23) 2013 Tdap 2013 Not reviewed this visit You Were Diagnosed With   
  
 Codes Comments Chronic pain syndrome    -  Primary ICD-10-CM: G89.4 ICD-9-CM: 338.4 Non-intractable vomiting with nausea, unspecified vomiting type     ICD-10-CM: R11.2 ICD-9-CM: 787.01 Essential hypertension     ICD-10-CM: I10 
ICD-9-CM: 401.9 Vitals BP Pulse Temp Resp Height(growth percentile) Weight(growth percentile) (!) 158/95 (BP 1 Location: Left arm, BP Patient Position: Sitting) 89 98 °F (36.7 °C) (Oral) 18 5' 11\" (1.803 m) 191 lb (86.6 kg) SpO2 BMI Smoking Status 96% 26.64 kg/m2 Never Smoker Vitals History BMI and BSA Data Body Mass Index Body Surface Area  
 26.64 kg/m 2 2.08 m 2 Preferred Pharmacy Pharmacy Name Phone  [facility], [address] [place] [address] Dr STEARNS AT [facility] [phone] Your Updated Medication List  
  
   
This list is accurate as of 9/20/18  4:04 PM.  Always use your most recent med list. amLODIPine 10 mg tablet Commonly known as:  Jennifer Colon Take 1 Tab by mouth daily. For blood pressure- increased dose 7//14/16  Indications: hypertension  
  
 cloNIDine HCl 0.1 mg tablet Commonly known as:  CATAPRES Take 1 Tab by mouth nightly. hydroCHLOROthiazide 25 mg tablet Commonly known as:  HYDRODIURIL Take 1 Tab by mouth daily. lisinopril 40 mg tablet Commonly known as:  Lynn Pinch Take 1 Tab by mouth daily. * oxyCODONE IR 30 mg immediate release tablet Commonly known as:  [place] Reagin Take 1 Tab by mouth every eight (8) hours as needed for Pain. Max Daily Amount: 90 mg. For breakthrough pain. Fill 9/26/18 * oxyCODONE ER 80 mg ER tablet Commonly known as:  OxyCONTIN Take 2 Tabs by mouth every twelve (12) hours. Max Daily Amount: 320 mg. Fill 9/26/18 * oxyCODONE ER 80 mg ER tablet Commonly known as:  OxyCONTIN Take 2 Tabs by mouth every twelve (12) hours. Max Daily Amount: 320 mg. Fill 10/25/18 * oxyCODONE IR 30 mg immediate release tablet Commonly known as:  [place] Reagin Take 1 Tab by mouth every eight (8) hours as needed for Pain. Max Daily Amount: 90 mg. For breakthrough pain. Fill 10/25/18  
  
 permethrin 5 % topical cream  
Commonly known as:  ACTICIN  
APPLY TO THE AFFECTED AREA ONCE FOR 1 DOSE  
  
 promethazine 25 mg tablet Commonly known as:  PHENERGAN Take 1 Tab by mouth every eight (8) hours as needed for Nausea. triamcinolone acetonide 0.1 % topical cream  
Commonly known as:  KENALOG  
APPLY TO PSORIASIS BID * Notice: This list has 4 medication(s) that are the same as other medications prescribed for you. Read the directions carefully, and ask your doctor or other care provider to review them with you. Prescriptions Printed Refills  
 oxyCODONE IR (ROXICODONE) 30 mg immediate release tablet 0 Sig: Take 1 Tab by mouth every eight (8) hours as needed for Pain. Max Daily Amount: 90 mg. For breakthrough pain. Fill 9/26/18 Class: Print Route: Oral  
 oxyCODONE ER (OXYCONTIN) 80 mg ER tablet 0 Sig: Take 2 Tabs by mouth every twelve (12) hours. Max Daily Amount: 320 mg. Fill 9/26/18 Class: Print Route: Oral  
 oxyCODONE ER (OXYCONTIN) 80 mg ER tablet 0 Sig: Take 2 Tabs by mouth every twelve (12) hours. Max Daily Amount: 320 mg. Fill 10/25/18 Class: Print Route: Oral  
 oxyCODONE IR (ROXICODONE) 30 mg immediate release tablet 0 Sig: Take 1 Tab by mouth every eight (8) hours as needed for Pain. Max Daily Amount: 90 mg. For breakthrough pain. Fill 10/25/18 Class: Print Route: Oral  
 promethazine (PHENERGAN) 25 mg tablet 3 Sig: Take 1 Tab by mouth every eight (8) hours as needed for Nausea. Class: Print Route: Oral  
  
Introducing [facility]! Minerva Amin introduces [email or web address] patient portal. Now you can access parts of your medical record, email your doctor's office, and request medication refills online. 1. In your internet browser, go to [email or web address] 2. Click on the First Time User? Click Here link in the Sign In box. You will see the New Member Sign Up page. 3. Enter your [email or web address] Access Code exactly as it appears below. You will not need to use this code after youve completed the sign-up process. If you do not sign up before the expiration date, you must request a new code. · [email or web address] Access Code: [email or web address] Expires: 10/24/2018  9:18 AM 
 
4. Enter the last four digits of your Social Security Number (xxxx) and Date of Birth (mm/dd/yyyy) as indicated and click Submit. You will be taken to the next sign-up page. 5. Create a [email or web address] ID.  This will be your [email or web address] login ID and cannot be changed, so think of one that is secure and easy to remember. 6. Create a [facility] password. You can change your password at any time. 7. Enter your Password Reset Question and Answer. This can be used at a later time if you forget your password. 8. Enter your e-mail address. You will receive e-mail notification when new information is available in [address] 9. Click Sign Up. You can now view and download portions of your medical record. 10. Click the Download Summary menu link to download a portable copy of your medical information. If you have questions, please visit the Frequently Asked Questions section of the [employer] website. Remember, [employer] is NOT to be used for urgent needs. For medical emergencies, dial 911. Now available from your iPhone and Android! Please provide this summary of care documentation to your next provider. Your primary care clinician is listed as Sigrid Kingston. If you have any questions after today's visit, please call [phone].
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
## 2018-11-21 ENCOUNTER — OFFICE VISIT (OUTPATIENT)
Dept: INTERNAL MEDICINE CLINIC | Age: 73
End: 2018-11-21

## 2018-11-21 VITALS
WEIGHT: 196.4 LBS | RESPIRATION RATE: 18 BRPM | DIASTOLIC BLOOD PRESSURE: 94 MMHG | SYSTOLIC BLOOD PRESSURE: 179 MMHG | BODY MASS INDEX: 27.5 KG/M2 | HEART RATE: 88 BPM | HEIGHT: 71 IN | TEMPERATURE: 97.9 F | OXYGEN SATURATION: 97 %

## 2018-11-21 DIAGNOSIS — Z00.00 MEDICARE ANNUAL WELLNESS VISIT, SUBSEQUENT: Primary | ICD-10-CM

## 2018-11-21 DIAGNOSIS — I10 ESSENTIAL HYPERTENSION: ICD-10-CM

## 2018-11-21 DIAGNOSIS — M54.40 CHRONIC LOW BACK PAIN WITH SCIATICA, SCIATICA LATERALITY UNSPECIFIED, UNSPECIFIED BACK PAIN LATERALITY: ICD-10-CM

## 2018-11-21 DIAGNOSIS — G89.4 CHRONIC PAIN SYNDROME: ICD-10-CM

## 2018-11-21 DIAGNOSIS — R11.0 NAUSEA: ICD-10-CM

## 2018-11-21 DIAGNOSIS — G89.29 CHRONIC LOW BACK PAIN WITH SCIATICA, SCIATICA LATERALITY UNSPECIFIED, UNSPECIFIED BACK PAIN LATERALITY: ICD-10-CM

## 2018-11-21 RX ORDER — OXYCODONE HYDROCHLORIDE 80 MG/1
160 TABLET, FILM COATED, EXTENDED RELEASE ORAL EVERY 12 HOURS
Qty: 120 TAB | Refills: 0 | Status: SHIPPED | OUTPATIENT
Start: 2018-11-21 | End: 2019-01-25 | Stop reason: SDUPTHER

## 2018-11-21 RX ORDER — OXYCODONE HYDROCHLORIDE 30 MG/1
30 TABLET ORAL
Qty: 90 TAB | Refills: 0 | Status: SHIPPED | OUTPATIENT
Start: 2018-11-21 | End: 2019-01-25 | Stop reason: SDUPTHER

## 2018-11-21 RX ORDER — CHLORTHALIDONE 25 MG/1
25 TABLET ORAL DAILY
Qty: 90 TAB | Refills: 0 | Status: SHIPPED | OUTPATIENT
Start: 2018-11-21 | End: 2019-06-24 | Stop reason: SDUPTHER

## 2018-11-21 RX ORDER — OLMESARTAN MEDOXOMIL 40 MG/1
40 TABLET ORAL DAILY
Qty: 90 TAB | Refills: 0 | Status: SHIPPED | OUTPATIENT
Start: 2018-11-21 | End: 2019-06-24 | Stop reason: SDUPTHER

## 2018-11-21 RX ORDER — CLONIDINE HYDROCHLORIDE 0.1 MG/1
0.1 TABLET ORAL
Qty: 90 TAB | Refills: 0 | Status: SHIPPED | OUTPATIENT
Start: 2018-11-21 | End: 2018-11-28 | Stop reason: SDUPTHER

## 2018-11-21 RX ORDER — AMLODIPINE BESYLATE 10 MG/1
10 TABLET ORAL DAILY
Qty: 90 TAB | Refills: 0 | Status: SHIPPED | OUTPATIENT
Start: 2018-11-21 | End: 2018-11-28 | Stop reason: SDUPTHER
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
## 2018-11-21 NOTE — PATIENT INSTRUCTIONS
Body Mass Index: Care Instructions Your Care Instructions Body mass index (BMI) can help you see if your weight is raising your risk for health problems. It uses a formula to compare how much you weigh with how tall you are. · A BMI lower than 18.5 is considered underweight. · A BMI between 18.5 and 24.9 is considered healthy. · A BMI between 25 and 29.9 is considered overweight. A BMI of 30 or higher is considered obese. If your BMI is in the normal range, it means that you have a lower risk for weight-related health problems. If your BMI is in the overweight or obese range, you may be at increased risk for weight-related health problems, such as high blood pressure, heart disease, stroke, arthritis or joint pain, and diabetes. If your BMI is in the underweight range, you may be at increased risk for health problems such as fatigue, lower protection (immunity) against illness, muscle loss, bone loss, hair loss, and hormone problems. BMI is just one measure of your risk for weight-related health problems. You may be at higher risk for health problems if you are not active, you eat an unhealthy diet, or you drink too much alcohol or use tobacco products. Follow-up care is a key part of your treatment and safety. Be sure to make and go to all appointments, and call your doctor if you are having problems. It's also a good idea to know your test results and keep a list of the medicines you take. How can you care for yourself at home? · Practice healthy eating habits. This includes eating plenty of fruits, vegetables, whole grains, lean protein, and low-fat dairy. · If your doctor recommends it, get more exercise. Walking is a good choice. Bit by bit, increase the amount you walk every day. Try for at least 30 minutes on most days of the week. · Do not smoke. Smoking can increase your risk for health problems.  If you need help quitting, talk to your doctor about stop-smoking programs and medicines. These can increase your chances of quitting for good. · Limit alcohol to 2 drinks a day for men and 1 drink a day for women. Too much alcohol can cause health problems. If you have a BMI higher than 25 · Your doctor may do other tests to check your risk for weight-related health problems. This may include measuring the distance around your waist. A waist measurement of more than 40 inches in men or 35 inches in women can increase the risk of weight-related health problems. · Talk with your doctor about steps you can take to stay healthy or improve your health. You may need to make lifestyle changes to lose weight and stay healthy, such as changing your diet and getting regular exercise. If you have a BMI lower than 18.5 · Your doctor may do other tests to check your risk for health problems. · Talk with your doctor about steps you can take to stay healthy or improve your health. You may need to make lifestyle changes to gain or maintain weight and stay healthy, such as getting more healthy foods in your diet and doing exercises to build muscle. Where can you learn more? Go to http://haile-zelda.info/. Enter S176 in the search box to learn more about \"Body Mass Index: Care Instructions. \" Current as of: October 13, 2016 Content Version: 11.4 © 7796-3081 [employer]. Care instructions adapted under license by [employer] (which disclaims liability or warranty for this information). If you have questions about a medical condition or this instruction, always ask your healthcare professional. [address] any warranty or liability for your use of this information.

## 2018-11-21 NOTE — PROGRESS NOTES
Carissa Blanca is a 81 y.o. male Chief Complaint Patient presents with  Hypertension  Pain (Chronic)  Nausea 1. Have you been to the ER, urgent care clinic since your last visit? Hospitalized since your last visit? No 
M 
2. Have you seen or consulted any other health care providers outside of the [address] [place] [address] since your last visit? Include any pap smears or colon screening. No 
 
 
Visit Vitals BP (!) 176/118 (BP 1 Location: Left arm, BP Patient Position: Sitting) Pulse 88 Temp 97.9 °F (36.6 °C) (Oral) Resp 18 Ht 5' 11\" (1.803 m) Wt 196 lb 6.4 oz (89.1 kg) SpO2 97% BMI 27.39 kg/m² Health Maintenance Due Topic Date Due  Shingrix Vaccine Age 50> (1 of 2) 12/01/1997  Pneumococcal 65+ Low/Medium Risk (2 of 2 - PCV13) 08/12/2014  GLAUCOMA SCREENING Q2Y  07/06/2017  COLONOSCOPY  01/01/2018  Influenza Age 5 to Adult  08/01/2018  MEDICARE YEARLY EXAM  08/03/2018 Medication Reconciliation completed, changes noted.   Please  Update medication list.

## 2018-11-28 DIAGNOSIS — M54.40 CHRONIC LOW BACK PAIN WITH SCIATICA, SCIATICA LATERALITY UNSPECIFIED, UNSPECIFIED BACK PAIN LATERALITY: ICD-10-CM

## 2018-11-28 DIAGNOSIS — I10 ESSENTIAL HYPERTENSION: ICD-10-CM

## 2018-11-28 DIAGNOSIS — G89.29 CHRONIC LOW BACK PAIN WITH SCIATICA, SCIATICA LATERALITY UNSPECIFIED, UNSPECIFIED BACK PAIN LATERALITY: ICD-10-CM

## 2018-11-28 RX ORDER — CLONIDINE HYDROCHLORIDE 0.1 MG/1
TABLET ORAL
Qty: 90 TAB | Refills: 1 | Status: SHIPPED | OUTPATIENT
Start: 2018-11-28 | End: 2019-06-24 | Stop reason: SDUPTHER

## 2018-11-28 RX ORDER — LISINOPRIL 40 MG/1
TABLET ORAL
Qty: 90 TAB | Refills: 1 | OUTPATIENT
Start: 2018-11-28

## 2018-11-28 RX ORDER — AMLODIPINE BESYLATE 10 MG/1
TABLET ORAL
Qty: 90 TAB | Refills: 1 | Status: SHIPPED | OUTPATIENT
Start: 2018-11-28 | End: 2019-01-25 | Stop reason: ALTCHOICE

## 2018-11-28 RX ORDER — HYDROCHLOROTHIAZIDE 25 MG/1
TABLET ORAL
Qty: 90 TAB | Refills: 1 | OUTPATIENT
Start: 2018-11-28

## 2019-01-25 ENCOUNTER — HOSPITAL ENCOUNTER (OUTPATIENT)
Dept: LAB | Age: 74
Discharge: HOME | End: 2019-01-25
Payer: MEDICARE

## 2019-01-25 ENCOUNTER — OFFICE VISIT (OUTPATIENT)
Dept: INTERNAL MEDICINE CLINIC | Age: 74
End: 2019-01-25

## 2019-01-25 VITALS
RESPIRATION RATE: 18 BRPM | WEIGHT: 194.13 LBS | SYSTOLIC BLOOD PRESSURE: 155 MMHG | HEIGHT: 71 IN | BODY MASS INDEX: 27.18 KG/M2 | HEART RATE: 92 BPM | TEMPERATURE: 97.6 F | DIASTOLIC BLOOD PRESSURE: 72 MMHG | OXYGEN SATURATION: 97 %

## 2019-01-25 DIAGNOSIS — B86 SCABIES: ICD-10-CM

## 2019-01-25 DIAGNOSIS — Z51.81 MEDICATION MONITORING ENCOUNTER: ICD-10-CM

## 2019-01-25 DIAGNOSIS — G89.4 CHRONIC PAIN SYNDROME: Primary | ICD-10-CM

## 2019-01-25 DIAGNOSIS — I10 ESSENTIAL HYPERTENSION: ICD-10-CM

## 2019-01-25 PROCEDURE — 82570 ASSAY OF URINE CREATININE: CPT

## 2019-01-25 RX ORDER — PERMETHRIN 50 MG/G
CREAM TOPICAL
Qty: 60 G | Refills: 2 | Status: SHIPPED | OUTPATIENT
Start: 2019-01-25 | End: 2019-04-29 | Stop reason: SDUPTHER

## 2019-01-25 RX ORDER — OXYCODONE HYDROCHLORIDE 30 MG/1
30 TABLET ORAL
Qty: 90 TAB | Refills: 0 | Status: SHIPPED | OUTPATIENT
Start: 2019-01-25 | End: 2019-02-22 | Stop reason: SDUPTHER

## 2019-01-25 RX ORDER — OXYCODONE HYDROCHLORIDE 80 MG/1
160 TABLET, FILM COATED, EXTENDED RELEASE ORAL EVERY 12 HOURS
Qty: 120 TAB | Refills: 0 | Status: SHIPPED | OUTPATIENT
Start: 2019-01-25 | End: 2019-02-22 | Stop reason: SDUPTHER

## 2019-01-25 RX ORDER — VERAPAMIL HYDROCHLORIDE 180 MG/1
180 TABLET, EXTENDED RELEASE ORAL
Qty: 30 TAB | Refills: 0 | Status: SHIPPED | OUTPATIENT
Start: 2019-01-25 | End: 2019-03-29
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
## 2019-02-02 LAB — DRUGS UR: NORMAL

## 2019-02-22 ENCOUNTER — OFFICE VISIT (OUTPATIENT)
Dept: INTERNAL MEDICINE CLINIC | Age: 74
End: 2019-02-22

## 2019-02-22 VITALS
DIASTOLIC BLOOD PRESSURE: 83 MMHG | WEIGHT: 202.6 LBS | TEMPERATURE: 98.6 F | SYSTOLIC BLOOD PRESSURE: 175 MMHG | HEART RATE: 105 BPM | BODY MASS INDEX: 28.36 KG/M2 | RESPIRATION RATE: 14 BRPM | HEIGHT: 71 IN | OXYGEN SATURATION: 96 %

## 2019-02-22 DIAGNOSIS — G89.4 CHRONIC PAIN SYNDROME: Primary | ICD-10-CM

## 2019-02-22 DIAGNOSIS — L30.8 PSORIASIFORM DERMATITIS: ICD-10-CM

## 2019-02-22 DIAGNOSIS — L03.116 CELLULITIS OF LEFT LOWER LEG: ICD-10-CM

## 2019-02-22 RX ORDER — OXYCODONE HYDROCHLORIDE 80 MG/1
160 TABLET, FILM COATED, EXTENDED RELEASE ORAL EVERY 12 HOURS
Qty: 120 TAB | Refills: 0 | Status: SHIPPED | OUTPATIENT
Start: 2019-02-22 | End: 2019-02-25 | Stop reason: SDUPTHER

## 2019-02-22 RX ORDER — OXYCODONE HYDROCHLORIDE 30 MG/1
30 TABLET ORAL
Qty: 90 TAB | Refills: 0 | Status: SHIPPED | OUTPATIENT
Start: 2019-02-22 | End: 2019-03-29 | Stop reason: SDUPTHER

## 2019-02-22 RX ORDER — CEPHALEXIN 500 MG/1
500 CAPSULE ORAL 4 TIMES DAILY
Qty: 28 CAP | Refills: 0 | Status: SHIPPED | OUTPATIENT
Start: 2019-02-22 | End: 2019-03-29 | Stop reason: ALTCHOICE
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
## 2019-02-25 ENCOUNTER — TELEPHONE (OUTPATIENT)
Dept: INTERNAL MEDICINE CLINIC | Age: 74
End: 2019-02-25

## 2019-02-25 DIAGNOSIS — G89.4 CHRONIC PAIN SYNDROME: ICD-10-CM

## 2019-02-25 RX ORDER — OXYCODONE HYDROCHLORIDE 80 MG/1
160 TABLET, FILM COATED, EXTENDED RELEASE ORAL EVERY 12 HOURS
Qty: 120 TAB | Refills: 0 | Status: SHIPPED | OUTPATIENT
Start: 2019-02-25 | End: 2019-03-29 | Stop reason: SDUPTHER

## 2019-02-25 NOTE — TELEPHONE ENCOUNTER
[place], [address] [facility] [address] [facility], with [employer], is requesting a call, regarding pt's Rx for \"Oxycontin\".  Pt received a 27 day supply, a month ago and pharmacy has dispensed 3 more days worth to make a 30 day supply.  Pt will need a new prescription after the 3 days is finished to refill for 30 days. [employer] (I)[phone]. Pt's Best contact number [phone].

## 2019-02-28 ENCOUNTER — TELEPHONE (OUTPATIENT)
Dept: INTERNAL MEDICINE CLINIC | Age: 74
End: 2019-02-28

## 2019-03-08 ENCOUNTER — DOCUMENTATION ONLY (OUTPATIENT)
Dept: INTERNAL MEDICINE CLINIC | Age: 74
End: 2019-03-08

## 2019-03-29 ENCOUNTER — OFFICE VISIT (OUTPATIENT)
Dept: INTERNAL MEDICINE CLINIC | Age: 74
End: 2019-03-29

## 2019-03-29 VITALS
HEART RATE: 82 BPM | BODY MASS INDEX: 27.61 KG/M2 | WEIGHT: 197.25 LBS | SYSTOLIC BLOOD PRESSURE: 160 MMHG | TEMPERATURE: 98.1 F | DIASTOLIC BLOOD PRESSURE: 93 MMHG | HEIGHT: 71 IN | OXYGEN SATURATION: 96 % | RESPIRATION RATE: 18 BRPM

## 2019-03-29 DIAGNOSIS — I10 ESSENTIAL HYPERTENSION: ICD-10-CM

## 2019-03-29 DIAGNOSIS — G89.4 CHRONIC PAIN SYNDROME: ICD-10-CM

## 2019-03-29 RX ORDER — VERAPAMIL HYDROCHLORIDE 240 MG/1
240 TABLET, FILM COATED, EXTENDED RELEASE ORAL
Qty: 30 TAB | Refills: 2 | Status: SHIPPED | OUTPATIENT
Start: 2019-03-29 | End: 2019-06-24 | Stop reason: SDUPTHER

## 2019-03-29 RX ORDER — OXYCODONE HYDROCHLORIDE 30 MG/1
30 TABLET ORAL
Qty: 80 TAB | Refills: 0 | Status: SHIPPED | OUTPATIENT
Start: 2019-03-29 | End: 2019-04-29 | Stop reason: SDUPTHER

## 2019-03-29 RX ORDER — OXYCODONE HYDROCHLORIDE 80 MG/1
160 TABLET, FILM COATED, EXTENDED RELEASE ORAL EVERY 12 HOURS
Qty: 120 TAB | Refills: 0 | Status: SHIPPED | OUTPATIENT
Start: 2019-03-29 | End: 2019-04-29 | Stop reason: SDUPTHER
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
## 2019-04-22 ENCOUNTER — TELEPHONE (OUTPATIENT)
Dept: INTERNAL MEDICINE CLINIC | Age: 74
End: 2019-04-22

## 2019-04-22 NOTE — TELEPHONE ENCOUNTER
Patient is requesting an apt with PCP for a med check apt + for some concerns with wounds on his stomach. Patient declined to  declined to wait for PCPs geraldo t opening on 05/02 and to see another provider sooner.  Patient is requesting to speak with the nurse, he can be reached at  [phone]

## 2019-04-22 NOTE — TELEPHONE ENCOUNTER
Patient called back. Appointment scheduled for 5/3/19, however, he will be out of his meds on 4/29/19. Please write script for Oxycodone IR 30mg and Oxycodone ER 80mg. Thanks.

## 2019-04-29 ENCOUNTER — OFFICE VISIT (OUTPATIENT)
Dept: INTERNAL MEDICINE CLINIC | Age: 74
End: 2019-04-29

## 2019-04-29 VITALS
BODY MASS INDEX: 28.18 KG/M2 | WEIGHT: 201.25 LBS | HEIGHT: 71 IN | TEMPERATURE: 98.2 F | OXYGEN SATURATION: 98 % | DIASTOLIC BLOOD PRESSURE: 78 MMHG | SYSTOLIC BLOOD PRESSURE: 158 MMHG | RESPIRATION RATE: 18 BRPM | HEART RATE: 77 BPM

## 2019-04-29 DIAGNOSIS — G99.2 STENOSIS OF CERVICAL SPINE WITH MYELOPATHY (HCC): ICD-10-CM

## 2019-04-29 DIAGNOSIS — B86 SCABIES: ICD-10-CM

## 2019-04-29 DIAGNOSIS — I10 ESSENTIAL HYPERTENSION: Primary | ICD-10-CM

## 2019-04-29 DIAGNOSIS — G89.4 CHRONIC PAIN SYNDROME: ICD-10-CM

## 2019-04-29 DIAGNOSIS — M48.02 STENOSIS OF CERVICAL SPINE WITH MYELOPATHY (HCC): ICD-10-CM

## 2019-04-29 RX ORDER — OXYCODONE HYDROCHLORIDE 80 MG/1
160 TABLET, FILM COATED, EXTENDED RELEASE ORAL EVERY 12 HOURS
Qty: 120 TAB | Refills: 0 | Status: SHIPPED | OUTPATIENT
Start: 2019-05-28 | End: 2019-04-29 | Stop reason: SDUPTHER

## 2019-04-29 RX ORDER — PERMETHRIN 50 MG/G
CREAM TOPICAL
Qty: 60 G | Refills: 2 | Status: SHIPPED | OUTPATIENT
Start: 2019-04-29 | End: 2019-06-24 | Stop reason: SDUPTHER

## 2019-04-29 RX ORDER — OXYCODONE HYDROCHLORIDE 80 MG/1
160 TABLET, FILM COATED, EXTENDED RELEASE ORAL EVERY 12 HOURS
Qty: 120 TAB | Refills: 0 | Status: SHIPPED | OUTPATIENT
Start: 2019-05-28 | End: 2019-06-24 | Stop reason: SDUPTHER

## 2019-04-29 RX ORDER — OXYCODONE HYDROCHLORIDE 30 MG/1
30 TABLET ORAL
Qty: 60 TAB | Refills: 0 | Status: SHIPPED | OUTPATIENT
Start: 2019-05-28 | End: 2019-04-29 | Stop reason: SDUPTHER

## 2019-04-29 RX ORDER — OXYCODONE HYDROCHLORIDE 80 MG/1
160 TABLET, FILM COATED, EXTENDED RELEASE ORAL EVERY 12 HOURS
Qty: 120 TAB | Refills: 0 | Status: SHIPPED | OUTPATIENT
Start: 2019-04-19 | End: 2019-04-29 | Stop reason: SDUPTHER

## 2019-04-29 RX ORDER — OXYCODONE HYDROCHLORIDE 30 MG/1
30 TABLET ORAL
Qty: 70 TAB | Refills: 0 | Status: SHIPPED | OUTPATIENT
Start: 2019-05-28 | End: 2019-06-24 | Stop reason: SDUPTHER

## 2019-04-29 RX ORDER — OXYCODONE HYDROCHLORIDE 30 MG/1
30 TABLET ORAL
Qty: 70 TAB | Refills: 0 | Status: SHIPPED | OUTPATIENT
Start: 2019-04-29 | End: 2019-04-29 | Stop reason: SDUPTHER

## 2019-04-29 RX ORDER — OXYCODONE HYDROCHLORIDE 80 MG/1
160 TABLET, FILM COATED, EXTENDED RELEASE ORAL EVERY 12 HOURS
Qty: 120 TAB | Refills: 0 | Status: SHIPPED | OUTPATIENT
Start: 2019-04-29 | End: 2019-04-29 | Stop reason: SDUPTHER

## 2019-04-29 RX ORDER — OXYCODONE HYDROCHLORIDE 30 MG/1
30 TABLET ORAL
Qty: 75 TAB | Refills: 0 | Status: SHIPPED | OUTPATIENT
Start: 2019-04-29 | End: 2019-04-29 | Stop reason: SDUPTHER
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
## 2019-06-20 ENCOUNTER — TELEPHONE (OUTPATIENT)
Dept: INTERNAL MEDICINE CLINIC | Age: 74
End: 2019-06-20

## 2019-06-20 NOTE — TELEPHONE ENCOUNTER
Patient called to report that he needs an appointment with DR. Nowak before 6/28/19. Patient stated that he will be out of medication by then. Patient also reports that he needs to be seen due to stomach issues as well as black spots on both legs and wounds on legs getting worse not better. Patient is requesting to speak to nurse regarding.      [phone]

## 2019-06-24 ENCOUNTER — OFFICE VISIT (OUTPATIENT)
Dept: INTERNAL MEDICINE CLINIC | Age: 74
End: 2019-06-24

## 2019-06-24 ENCOUNTER — HOSPITAL ENCOUNTER (OUTPATIENT)
Dept: LAB | Age: 74
Discharge: HOME | End: 2019-06-24
Payer: MEDICARE

## 2019-06-24 VITALS
SYSTOLIC BLOOD PRESSURE: 200 MMHG | TEMPERATURE: 97.8 F | BODY MASS INDEX: 27.33 KG/M2 | OXYGEN SATURATION: 96 % | HEART RATE: 76 BPM | RESPIRATION RATE: 18 BRPM | DIASTOLIC BLOOD PRESSURE: 93 MMHG | WEIGHT: 195.25 LBS | HEIGHT: 71 IN

## 2019-06-24 DIAGNOSIS — G89.4 CHRONIC PAIN SYNDROME: Primary | ICD-10-CM

## 2019-06-24 DIAGNOSIS — B86 SCABIES: ICD-10-CM

## 2019-06-24 DIAGNOSIS — G89.29 CHRONIC LOW BACK PAIN WITH SCIATICA, SCIATICA LATERALITY UNSPECIFIED, UNSPECIFIED BACK PAIN LATERALITY: ICD-10-CM

## 2019-06-24 DIAGNOSIS — I10 ESSENTIAL HYPERTENSION: ICD-10-CM

## 2019-06-24 DIAGNOSIS — M54.40 CHRONIC LOW BACK PAIN WITH SCIATICA, SCIATICA LATERALITY UNSPECIFIED, UNSPECIFIED BACK PAIN LATERALITY: ICD-10-CM

## 2019-06-24 PROCEDURE — 83735 ASSAY OF MAGNESIUM: CPT

## 2019-06-24 PROCEDURE — 80053 COMPREHEN METABOLIC PANEL: CPT

## 2019-06-24 PROCEDURE — 36415 COLL VENOUS BLD VENIPUNCTURE: CPT

## 2019-06-24 PROCEDURE — 85025 COMPLETE CBC W/AUTO DIFF WBC: CPT

## 2019-06-24 RX ORDER — CLONIDINE HYDROCHLORIDE 0.1 MG/1
0.1 TABLET ORAL 2 TIMES DAILY
Qty: 180 TAB | Refills: 1 | Status: SHIPPED | OUTPATIENT
Start: 2019-06-24 | End: 2019-11-21

## 2019-06-24 RX ORDER — CHLORTHALIDONE 25 MG/1
25 TABLET ORAL DAILY
Qty: 90 TAB | Refills: 1 | Status: SHIPPED | OUTPATIENT
Start: 2019-06-24 | End: 2020-01-10 | Stop reason: SDUPTHER

## 2019-06-24 RX ORDER — VERAPAMIL HYDROCHLORIDE 240 MG/1
240 TABLET, FILM COATED, EXTENDED RELEASE ORAL
Qty: 90 TAB | Refills: 1 | Status: SHIPPED | OUTPATIENT
Start: 2019-06-24 | End: 2019-11-21 | Stop reason: ALTCHOICE

## 2019-06-24 RX ORDER — OXYCODONE HYDROCHLORIDE 80 MG/1
160 TABLET, FILM COATED, EXTENDED RELEASE ORAL EVERY 12 HOURS
Qty: 120 TAB | Refills: 0 | Status: SHIPPED | OUTPATIENT
Start: 2019-06-29 | End: 2019-07-24 | Stop reason: SDUPTHER

## 2019-06-24 RX ORDER — PERMETHRIN 50 MG/G
CREAM TOPICAL
Qty: 60 G | Refills: 2 | Status: SHIPPED | OUTPATIENT
Start: 2019-06-24 | End: 2019-11-21 | Stop reason: ALTCHOICE

## 2019-06-24 RX ORDER — OXYCODONE HYDROCHLORIDE 30 MG/1
30 TABLET ORAL
Qty: 65 TAB | Refills: 0 | Status: SHIPPED | OUTPATIENT
Start: 2019-06-27 | End: 2019-07-24 | Stop reason: SDUPTHER

## 2019-06-24 RX ORDER — OLMESARTAN MEDOXOMIL 40 MG/1
40 TABLET ORAL DAILY
Qty: 90 TAB | Refills: 1 | Status: SHIPPED | OUTPATIENT
Start: 2019-06-24 | End: 2020-01-10 | Stop reason: SDUPTHER

## 2019-06-25 LAB
ALBUMIN SERPL-MCNC: 4.5 G/DL (ref 3.5–4.8)
ALBUMIN/GLOB SERPL: 1.6 {RATIO} (ref 1.2–2.2)
ALP SERPL-CCNC: 73 IU/L (ref 39–117)
ALT SERPL-CCNC: 20 IU/L (ref 0–44)
AST SERPL-CCNC: 27 IU/L (ref 0–40)
BASOPHILS # BLD AUTO: 0 X10E3/UL (ref 0–0.2)
BASOPHILS NFR BLD AUTO: 0 %
BILIRUB SERPL-MCNC: 0.4 MG/DL (ref 0–1.2)
BUN SERPL-MCNC: 12 MG/DL (ref 8–27)
BUN/CREAT SERPL: 12 (ref 10–24)
CALCIUM SERPL-MCNC: 9.6 MG/DL (ref 8.6–10.2)
CHLORIDE SERPL-SCNC: 99 MMOL/L (ref 96–106)
CO2 SERPL-SCNC: 29 MMOL/L (ref 20–29)
CREAT SERPL-MCNC: 0.98 MG/DL (ref 0.76–1.27)
EOSINOPHIL # BLD AUTO: 0.1 X10E3/UL (ref 0–0.4)
EOSINOPHIL NFR BLD AUTO: 1 %
ERYTHROCYTE [DISTWIDTH] IN BLOOD BY AUTOMATED COUNT: 13.9 % (ref 12.3–15.4)
GLOBULIN SER CALC-MCNC: 2.9 G/DL (ref 1.5–4.5)
GLUCOSE SERPL-MCNC: 92 MG/DL (ref 65–99)
HCT VFR BLD AUTO: 46.2 % (ref 37.5–51)
HGB BLD-MCNC: 15.8 G/DL (ref 13–17.7)
IMM GRANULOCYTES # BLD AUTO: 0 X10E3/UL (ref 0–0.1)
IMM GRANULOCYTES NFR BLD AUTO: 0 %
LYMPHOCYTES # BLD AUTO: 1.9 X10E3/UL (ref 0.7–3.1)
LYMPHOCYTES NFR BLD AUTO: 27 %
MAGNESIUM SERPL-MCNC: 2.2 MG/DL (ref 1.6–2.3)
MCH RBC QN AUTO: 31.7 PG (ref 26.6–33)
MCHC RBC AUTO-ENTMCNC: 34.2 G/DL (ref 31.5–35.7)
MCV RBC AUTO: 93 FL (ref 79–97)
MONOCYTES # BLD AUTO: 0.8 X10E3/UL (ref 0.1–0.9)
MONOCYTES NFR BLD AUTO: 11 %
NEUTROPHILS # BLD AUTO: 4.1 X10E3/UL (ref 1.4–7)
NEUTROPHILS NFR BLD AUTO: 61 %
PLATELET # BLD AUTO: 275 X10E3/UL (ref 150–450)
POTASSIUM SERPL-SCNC: 4.8 MMOL/L (ref 3.5–5.2)
PROT SERPL-MCNC: 7.4 G/DL (ref 6–8.5)
RBC # BLD AUTO: 4.98 X10E6/UL (ref 4.14–5.8)
SODIUM SERPL-SCNC: 142 MMOL/L (ref 134–144)
WBC # BLD AUTO: 6.8 X10E3/UL (ref 3.4–10.8)
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
## 2019-07-24 ENCOUNTER — OFFICE VISIT (OUTPATIENT)
Dept: INTERNAL MEDICINE CLINIC | Age: 74
End: 2019-07-24

## 2019-07-24 VITALS
OXYGEN SATURATION: 94 % | DIASTOLIC BLOOD PRESSURE: 95 MMHG | SYSTOLIC BLOOD PRESSURE: 154 MMHG | HEIGHT: 71 IN | TEMPERATURE: 98.1 F | HEART RATE: 84 BPM | WEIGHT: 196 LBS | RESPIRATION RATE: 18 BRPM | BODY MASS INDEX: 27.44 KG/M2

## 2019-07-24 DIAGNOSIS — G89.4 CHRONIC PAIN SYNDROME: ICD-10-CM

## 2019-07-24 DIAGNOSIS — I10 ESSENTIAL HYPERTENSION: Primary | ICD-10-CM

## 2019-07-24 DIAGNOSIS — F33.1 MODERATE EPISODE OF RECURRENT MAJOR DEPRESSIVE DISORDER (HCC): ICD-10-CM

## 2019-07-24 RX ORDER — OXYCODONE HYDROCHLORIDE 30 MG/1
30 TABLET ORAL
Qty: 66 TAB | Refills: 0 | Status: SHIPPED | OUTPATIENT
Start: 2019-07-24 | End: 2019-07-24 | Stop reason: SDUPTHER

## 2019-07-24 RX ORDER — OXYCODONE HYDROCHLORIDE 80 MG/1
160 TABLET, FILM COATED, EXTENDED RELEASE ORAL EVERY 12 HOURS
Qty: 120 TAB | Refills: 0 | Status: SHIPPED | OUTPATIENT
Start: 2019-08-28 | End: 2019-07-24 | Stop reason: SDUPTHER

## 2019-07-24 RX ORDER — OXYCODONE HYDROCHLORIDE 30 MG/1
30 TABLET ORAL
Qty: 60 TAB | Refills: 0 | Status: SHIPPED | OUTPATIENT
Start: 2019-08-28 | End: 2019-09-26 | Stop reason: SDUPTHER

## 2019-07-24 RX ORDER — OXYCODONE HYDROCHLORIDE 30 MG/1
30 TABLET ORAL
Qty: 60 TAB | Refills: 0 | Status: SHIPPED | OUTPATIENT
Start: 2019-07-24 | End: 2019-07-24 | Stop reason: SDUPTHER

## 2019-07-24 RX ORDER — OXYCODONE HYDROCHLORIDE 80 MG/1
160 TABLET, FILM COATED, EXTENDED RELEASE ORAL EVERY 12 HOURS
Qty: 120 TAB | Refills: 0 | Status: SHIPPED | OUTPATIENT
Start: 2019-07-28 | End: 2019-07-24 | Stop reason: SDUPTHER

## 2019-07-24 RX ORDER — SPIRONOLACTONE 25 MG/1
25 TABLET ORAL DAILY
Qty: 90 TAB | Refills: 0 | Status: SHIPPED | OUTPATIENT
Start: 2019-07-24 | End: 2020-01-10 | Stop reason: SDUPTHER

## 2019-07-24 RX ORDER — OXYCODONE HYDROCHLORIDE 80 MG/1
160 TABLET, FILM COATED, EXTENDED RELEASE ORAL EVERY 12 HOURS
Qty: 120 TAB | Refills: 0 | Status: SHIPPED | OUTPATIENT
Start: 2019-08-28 | End: 2019-09-26 | Stop reason: SDUPTHER
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
## 2019-09-04 DIAGNOSIS — B86 SCABIES: ICD-10-CM

## 2019-09-04 RX ORDER — PERMETHRIN 50 MG/G
CREAM TOPICAL
Qty: 60 G | Refills: 0 | Status: SHIPPED | OUTPATIENT
Start: 2019-09-04 | End: 2019-11-12 | Stop reason: SDUPTHER

## 2019-09-26 ENCOUNTER — OFFICE VISIT (OUTPATIENT)
Dept: INTERNAL MEDICINE CLINIC | Age: 74
End: 2019-09-26

## 2019-09-26 VITALS
SYSTOLIC BLOOD PRESSURE: 130 MMHG | WEIGHT: 198 LBS | BODY MASS INDEX: 27.72 KG/M2 | RESPIRATION RATE: 18 BRPM | OXYGEN SATURATION: 96 % | DIASTOLIC BLOOD PRESSURE: 85 MMHG | HEART RATE: 79 BPM | HEIGHT: 71 IN | TEMPERATURE: 98.1 F

## 2019-09-26 DIAGNOSIS — I10 ESSENTIAL HYPERTENSION: Primary | ICD-10-CM

## 2019-09-26 DIAGNOSIS — G89.4 CHRONIC PAIN SYNDROME: ICD-10-CM

## 2019-09-26 RX ORDER — OXYCODONE HYDROCHLORIDE 80 MG/1
160 TABLET, FILM COATED, EXTENDED RELEASE ORAL EVERY 12 HOURS
Qty: 120 TAB | Refills: 0 | Status: SHIPPED | OUTPATIENT
Start: 2019-10-24 | End: 2019-11-21 | Stop reason: SDUPTHER

## 2019-09-26 RX ORDER — OXYCODONE HYDROCHLORIDE 80 MG/1
160 TABLET, FILM COATED, EXTENDED RELEASE ORAL EVERY 12 HOURS
Qty: 120 TAB | Refills: 0 | Status: SHIPPED | OUTPATIENT
Start: 2019-09-26 | End: 2019-09-26 | Stop reason: SDUPTHER

## 2019-09-26 RX ORDER — OXYCODONE HYDROCHLORIDE 30 MG/1
30 TABLET ORAL
Qty: 60 TAB | Refills: 0 | Status: SHIPPED | OUTPATIENT
Start: 2019-10-24 | End: 2019-11-21 | Stop reason: SDUPTHER

## 2019-09-26 RX ORDER — OXYCODONE HYDROCHLORIDE 30 MG/1
30 TABLET ORAL
Qty: 60 TAB | Refills: 0 | Status: SHIPPED | OUTPATIENT
Start: 2019-09-26 | End: 2019-09-26 | Stop reason: SDUPTHER

## 2019-09-26 NOTE — PATIENT INSTRUCTIONS
Counselors Myn[facility] (\"[facility]\") Prerna Treviño, Ph.D. 
[place] [address] [facility], [address] [place] [address] Phone [phone] Fax [phone] Sam Carvajal, [address] Phone [phone] Seamus Cabral, PhD 
Couples counselor/Sex therapist 
[address] [place] [address] [place] [address] Phone [phone]
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
## 2019-10-24 ENCOUNTER — HOSPITAL ENCOUNTER (EMERGENCY)
Age: 74
Discharge: HOME | End: 2019-10-24
Attending: EMERGENCY MEDICINE
Payer: MEDICARE

## 2019-10-24 VITALS
DIASTOLIC BLOOD PRESSURE: 104 MMHG | HEIGHT: 71 IN | RESPIRATION RATE: 14 BRPM | BODY MASS INDEX: 27.72 KG/M2 | SYSTOLIC BLOOD PRESSURE: 189 MMHG | TEMPERATURE: 98.4 F | OXYGEN SATURATION: 98 % | HEART RATE: 109 BPM | WEIGHT: 198 LBS

## 2019-10-24 DIAGNOSIS — R21 RASH/SKIN ERUPTION: Primary | ICD-10-CM

## 2019-10-24 PROCEDURE — 99281 EMR DPT VST MAYX REQ PHY/QHP: CPT

## 2019-10-24 RX ORDER — PREDNISONE 20 MG/1
40 TABLET ORAL DAILY
Qty: 10 TAB | Refills: 0 | Status: SHIPPED | OUTPATIENT
Start: 2019-10-24 | End: 2019-10-29

## 2019-10-24 RX ORDER — HYDROXYZINE 50 MG/1
50 TABLET, FILM COATED ORAL
Qty: 20 TAB | Refills: 0 | OUTPATIENT
Start: 2019-10-24 | End: 2019-10-24

## 2019-10-24 RX ORDER — HYDROXYZINE 25 MG/1
25 TABLET, FILM COATED ORAL
Qty: 20 TAB | Refills: 0 | Status: SHIPPED | OUTPATIENT
Start: 2019-10-24 | End: 2019-11-03

## 2019-10-24 RX ORDER — PREDNISONE 20 MG/1
40 TABLET ORAL DAILY
Qty: 10 TAB | Refills: 0 | Status: SHIPPED | OUTPATIENT
Start: 2019-10-24 | End: 2019-10-24

## 2019-10-24 NOTE — ED TRIAGE NOTES
Pt here for rash to arms and hands. States that they started to pop up over last 3 days and are very itchy. Dark red spots noted up pts arms. States had some on his face.   Rash noticed to legs as well

## 2019-10-24 NOTE — DISCHARGE INSTRUCTIONS
Thank you for allowing us to care for you today. Please follow-up with your Primary Care provider in the next 2-3 days if your symptoms do not improve. Plan for home:       Prednisone 40 mg tablets, take before lunch each day for the next 5 days. Taking too late in the day can cause a disruption in your sleep cycle. Atarax 25 mg tablet up to 4 times a day or every 6 hours as needed for itchy skin. Follow-up with dermatology or primary care provider. Please call, return to the ED or see your Primary Care Provider  if there are signs or symptoms of wound infection: fevers, chills, sweating, fast heartbeat, or wounds with increased warmth, increased redness, malodor (bad smelling), purulent (pus) drainage and/or pain that is increased, new or difficult to control. No Swimming, tub baths or hot tubs until your lesions have healed. Come back to the ER if you have worsening symptoms, fevers over 100.9, shaking chills, nausea or vomiting. Patient Education        Rash: Care Instructions  Your Care Instructions  A rash is any irritation or inflammation of the skin. Rashes have many possible causes, including allergy, infection, illness, heat, and emotional stress. Follow-up care is a key part of your treatment and safety. Be sure to make and go to all appointments, and call your doctor if you are having problems. It's also a good idea to know your test results and keep a list of the medicines you take. How can you care for yourself at home? · Wash the area with water only. Soap can make dryness and itching worse. Pat dry. · Put cold, wet cloths on the rash to reduce itching. · Keep cool, and stay out of the sun. · Leave the rash open to the air as much of the time as possible. · Sometimes petroleum jelly (Vaseline) can help relieve the discomfort caused by a rash. A moisturizing lotion, such as Cetaphil, also may help.  Calamine lotion may help for rashes caused by contact with something (such as a plant or soap) that irritated the skin. Use it 3 or 4 times a day. · If your doctor prescribed a cream, use it as directed. If your doctor prescribed medicine, take it exactly as directed. · If your rash itches so badly that it interferes with your normal activities, take an over-the-counter antihistamine, such as diphenhydramine (Benadryl) or loratadine (Claritin). Read and follow all instructions on the label. When should you call for help? Call your doctor now or seek immediate medical care if:    · You have signs of infection, such as:  ? Increased pain, swelling, warmth, or redness. ? Red streaks leading from the area. ? Pus draining from the area. ? A fever.     · You have joint pain along with the rash.    Watch closely for changes in your health, and be sure to contact your doctor if:    · Your rash is changing or getting worse. For example, call if you have pain along with the rash, the rash is spreading, or you have new blisters.     · You do not get better after 1 week. Where can you learn more? Go to http://haile-zelda.info/. Enter J610 in the search box to learn more about \"Rash: Care Instructions. \"  Current as of: April 1, 2019  Content Version: 12.2  © 5737-8379 [employer]. Care instructions adapted under license by [employer] (which disclaims liability or warranty for this information). If you have questions about a medical condition or this instruction, always ask your healthcare professional. [place] [address] any warranty or liability for your use of this information.
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
## 2019-11-12 DIAGNOSIS — B86 SCABIES: ICD-10-CM

## 2019-11-12 RX ORDER — PERMETHRIN 50 MG/G
CREAM TOPICAL
Qty: 60 G | Refills: 0 | Status: SHIPPED | OUTPATIENT
Start: 2019-11-12 | End: 2020-01-10 | Stop reason: SDUPTHER

## 2019-11-21 ENCOUNTER — OFFICE VISIT (OUTPATIENT)
Dept: INTERNAL MEDICINE CLINIC | Age: 74
End: 2019-11-21

## 2019-11-21 VITALS
DIASTOLIC BLOOD PRESSURE: 128 MMHG | OXYGEN SATURATION: 96 % | BODY MASS INDEX: 27.66 KG/M2 | TEMPERATURE: 98.1 F | SYSTOLIC BLOOD PRESSURE: 186 MMHG | HEART RATE: 94 BPM | WEIGHT: 197.6 LBS | HEIGHT: 71 IN | RESPIRATION RATE: 14 BRPM

## 2019-11-21 DIAGNOSIS — G89.29 CHRONIC LOW BACK PAIN WITH SCIATICA, SCIATICA LATERALITY UNSPECIFIED, UNSPECIFIED BACK PAIN LATERALITY: ICD-10-CM

## 2019-11-21 DIAGNOSIS — I10 ESSENTIAL HYPERTENSION: ICD-10-CM

## 2019-11-21 DIAGNOSIS — L30.8 PSORIASIFORM DERMATITIS: ICD-10-CM

## 2019-11-21 DIAGNOSIS — M54.40 CHRONIC LOW BACK PAIN WITH SCIATICA, SCIATICA LATERALITY UNSPECIFIED, UNSPECIFIED BACK PAIN LATERALITY: ICD-10-CM

## 2019-11-21 DIAGNOSIS — G89.4 CHRONIC PAIN SYNDROME: Primary | ICD-10-CM

## 2019-11-21 RX ORDER — OXYCODONE HYDROCHLORIDE 30 MG/1
30 TABLET ORAL
Qty: 60 TAB | Refills: 0 | Status: SHIPPED | OUTPATIENT
Start: 2019-11-21 | End: 2019-12-21

## 2019-11-21 RX ORDER — PREDNISONE 20 MG/1
TABLET ORAL
Qty: 26 TAB | Refills: 0 | Status: SHIPPED | OUTPATIENT
Start: 2019-11-21 | End: 2019-11-21 | Stop reason: SDUPTHER

## 2019-11-21 RX ORDER — PREDNISONE 20 MG/1
TABLET ORAL
Qty: 26 TAB | Refills: 0 | Status: SHIPPED | OUTPATIENT
Start: 2019-11-21 | End: 2019-12-07

## 2019-11-21 RX ORDER — OXYCODONE HYDROCHLORIDE 80 MG/1
160 TABLET, FILM COATED, EXTENDED RELEASE ORAL EVERY 12 HOURS
Qty: 120 TAB | Refills: 0 | Status: SHIPPED | OUTPATIENT
Start: 2019-12-22 | End: 2020-01-10 | Stop reason: SDUPTHER

## 2019-11-21 RX ORDER — OXYCODONE HYDROCHLORIDE 80 MG/1
160 TABLET, FILM COATED, EXTENDED RELEASE ORAL EVERY 12 HOURS
Qty: 120 TAB | Refills: 0 | Status: SHIPPED | OUTPATIENT
Start: 2019-11-21 | End: 2019-12-21

## 2019-11-21 RX ORDER — OXYCODONE HYDROCHLORIDE 30 MG/1
30 TABLET ORAL
Qty: 60 TAB | Refills: 0 | Status: SHIPPED | OUTPATIENT
Start: 2019-11-23 | End: 2019-11-21 | Stop reason: SDUPTHER

## 2019-11-21 RX ORDER — OXYCODONE HYDROCHLORIDE 30 MG/1
30 TABLET ORAL
Qty: 60 TAB | Refills: 0 | Status: SHIPPED | OUTPATIENT
Start: 2019-12-22 | End: 2020-01-10 | Stop reason: SDUPTHER

## 2019-11-21 RX ORDER — OXYCODONE HYDROCHLORIDE 80 MG/1
160 TABLET, FILM COATED, EXTENDED RELEASE ORAL EVERY 12 HOURS
Qty: 120 TAB | Refills: 0 | Status: SHIPPED | OUTPATIENT
Start: 2019-11-23 | End: 2019-11-21 | Stop reason: SDUPTHER

## 2019-11-21 RX ORDER — CLONIDINE HYDROCHLORIDE 0.2 MG/1
0.2 TABLET ORAL 2 TIMES DAILY
Qty: 180 TAB | Refills: 0 | Status: SHIPPED | OUTPATIENT
Start: 2019-11-21 | End: 2020-01-10 | Stop reason: SDUPTHER
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
## 2020-01-10 ENCOUNTER — OFFICE VISIT (OUTPATIENT)
Dept: INTERNAL MEDICINE CLINIC | Age: 75
End: 2020-01-10

## 2020-01-10 VITALS
BODY MASS INDEX: 29.23 KG/M2 | HEART RATE: 100 BPM | WEIGHT: 208.8 LBS | TEMPERATURE: 98.6 F | SYSTOLIC BLOOD PRESSURE: 157 MMHG | HEIGHT: 71 IN | RESPIRATION RATE: 14 BRPM | DIASTOLIC BLOOD PRESSURE: 100 MMHG | OXYGEN SATURATION: 95 %

## 2020-01-10 DIAGNOSIS — G89.4 CHRONIC PAIN SYNDROME: Primary | ICD-10-CM

## 2020-01-10 DIAGNOSIS — M54.40 CHRONIC LOW BACK PAIN WITH SCIATICA, SCIATICA LATERALITY UNSPECIFIED, UNSPECIFIED BACK PAIN LATERALITY: ICD-10-CM

## 2020-01-10 DIAGNOSIS — Z51.81 MEDICATION MONITORING ENCOUNTER: ICD-10-CM

## 2020-01-10 DIAGNOSIS — B86 SCABIES: ICD-10-CM

## 2020-01-10 DIAGNOSIS — G89.29 CHRONIC LOW BACK PAIN WITH SCIATICA, SCIATICA LATERALITY UNSPECIFIED, UNSPECIFIED BACK PAIN LATERALITY: ICD-10-CM

## 2020-01-10 LAB
BARBITURATES UR POC: NEGATIVE
BENZODIAZEPINES UR POC: NEGATIVE
COCAINE QL URINE POC: NEGATIVE
LOT EXP DATE POC: NORMAL
LOT NUMBER POC: NORMAL
MARIJUANA (THC) QL URINE POC: NEGATIVE
MDMA/ECSTASY UR POC: NEGATIVE
METHADONE QL URINE POC: NORMAL
METHAMPHETAMINE QL URINE POC: NEGATIVE
NTI OTHER MICRO TRANSPORT 977598: NORMAL
OPIATES QL URINE POC: NORMAL
OXYCODONE UR POC: NORMAL
VALID INTERNAL CONTROL?: YES

## 2020-01-10 RX ORDER — SPIRONOLACTONE 25 MG/1
25 TABLET ORAL DAILY
Qty: 90 TAB | Refills: 1 | Status: SHIPPED | OUTPATIENT
Start: 2020-01-10 | End: 2020-10-22 | Stop reason: SDUPTHER

## 2020-01-10 RX ORDER — CLONIDINE HYDROCHLORIDE 0.2 MG/1
0.2 TABLET ORAL 2 TIMES DAILY
Qty: 180 TAB | Refills: 1 | Status: SHIPPED | OUTPATIENT
Start: 2020-01-10 | End: 2020-10-22 | Stop reason: SDUPTHER

## 2020-01-10 RX ORDER — CHLORTHALIDONE 25 MG/1
25 TABLET ORAL DAILY
Qty: 90 TAB | Refills: 1 | Status: SHIPPED | OUTPATIENT
Start: 2020-01-10 | End: 2020-10-22 | Stop reason: SDUPTHER

## 2020-01-10 RX ORDER — OLMESARTAN MEDOXOMIL 40 MG/1
40 TABLET ORAL DAILY
Qty: 90 TAB | Refills: 1 | Status: SHIPPED | OUTPATIENT
Start: 2020-01-10 | End: 2020-10-22 | Stop reason: SDUPTHER

## 2020-01-10 RX ORDER — OXYCODONE HYDROCHLORIDE 80 MG/1
160 TABLET, FILM COATED, EXTENDED RELEASE ORAL EVERY 12 HOURS
Qty: 120 TAB | Refills: 0 | Status: SHIPPED | OUTPATIENT
Start: 2020-02-20 | End: 2020-01-24 | Stop reason: SDUPTHER

## 2020-01-10 RX ORDER — PERMETHRIN 50 MG/G
CREAM TOPICAL
Qty: 60 G | Refills: 1 | Status: SHIPPED | OUTPATIENT
Start: 2020-01-10 | End: 2020-03-18 | Stop reason: ALTCHOICE

## 2020-01-10 RX ORDER — OXYCODONE HYDROCHLORIDE 30 MG/1
30 TABLET ORAL
Qty: 60 TAB | Refills: 0 | Status: SHIPPED | OUTPATIENT
Start: 2020-01-21 | End: 2020-01-10 | Stop reason: SDUPTHER

## 2020-01-10 RX ORDER — OXYCODONE HYDROCHLORIDE 30 MG/1
30 TABLET ORAL
Qty: 60 TAB | Refills: 0 | Status: SHIPPED | OUTPATIENT
Start: 2020-01-21 | End: 2020-01-24 | Stop reason: SDUPTHER

## 2020-01-10 RX ORDER — OXYCODONE HYDROCHLORIDE 80 MG/1
160 TABLET, FILM COATED, EXTENDED RELEASE ORAL EVERY 12 HOURS
Qty: 120 TAB | Refills: 0 | Status: SHIPPED | OUTPATIENT
Start: 2020-02-20 | End: 2020-01-10 | Stop reason: SDUPTHER

## 2020-01-10 NOTE — PROGRESS NOTES
Patient came into clinic to drop off forms for physician to complete.   The forms are listed as [facility] [employer].   Patient also stated that he would like a work excuse for the dates listed: 06/24-07/05.   Patient also stated he would like each of his suggestions to be completed today.   Writer advised patient that MD will complete forms at his earliest convenience.     Patient's number is listed below for any questions and concerns:   CELL: [phone]          Results of urine drug screen reviewed and are appropriate based on list of prescribed medications.
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
## 2020-01-24 DIAGNOSIS — G89.4 CHRONIC PAIN SYNDROME: ICD-10-CM

## 2020-01-24 RX ORDER — OXYCODONE HYDROCHLORIDE 80 MG/1
160 TABLET, FILM COATED, EXTENDED RELEASE ORAL EVERY 12 HOURS
Qty: 120 TAB | Refills: 0 | Status: SHIPPED | OUTPATIENT
Start: 2020-02-20 | End: 2020-01-27 | Stop reason: SDUPTHER

## 2020-01-24 RX ORDER — OXYCODONE HYDROCHLORIDE 30 MG/1
30 TABLET ORAL
Qty: 60 TAB | Refills: 0 | Status: SHIPPED | OUTPATIENT
Start: 2020-01-24 | End: 2020-02-28 | Stop reason: SDUPTHER

## 2020-01-27 DIAGNOSIS — G89.4 CHRONIC PAIN SYNDROME: ICD-10-CM

## 2020-01-27 RX ORDER — OXYCODONE HYDROCHLORIDE 80 MG/1
160 TABLET, FILM COATED, EXTENDED RELEASE ORAL EVERY 12 HOURS
Qty: 120 TAB | Refills: 0 | Status: SHIPPED | OUTPATIENT
Start: 2020-01-27 | End: 2020-02-26

## 2020-01-27 NOTE — TELEPHONE ENCOUNTER
I corrected his OxyContin prescription to be filled today, January 27, 2020. I sent this to [facility] now. Per , last filled OxyContin 80 mg ER #120 on 12/22/19. He is due for refill today. Next refill due 2/26/20. Filled oxycodone 30 mg #60 1/21/20. Next refill due 2/20/20. Perhaps he can make it last until 2/26/20 and get then February refills on the same day (2/26/20).

## 2020-01-27 NOTE — TELEPHONE ENCOUNTER
[id number] - [place], [place] - [address] AT [address] [place] [address]. [phone]    Patient's pharmacy called to report that PCP write same fill date on all 3 Roxicodone medication. Please call to advise of each fill date for each script.

## 2020-02-27 DIAGNOSIS — G89.4 CHRONIC PAIN SYNDROME: ICD-10-CM

## 2020-02-27 RX ORDER — OXYCODONE HYDROCHLORIDE 80 MG/1
160 TABLET, FILM COATED, EXTENDED RELEASE ORAL EVERY 12 HOURS
Qty: 120 TAB | Refills: 0 | Status: CANCELLED | OUTPATIENT
Start: 2020-02-27 | End: 2020-03-28

## 2020-02-28 DIAGNOSIS — G89.4 CHRONIC PAIN SYNDROME: ICD-10-CM

## 2020-02-28 RX ORDER — OXYCODONE HYDROCHLORIDE 80 MG/1
160 TABLET, FILM COATED, EXTENDED RELEASE ORAL EVERY 12 HOURS
Qty: 120 TAB | Refills: 0 | Status: SHIPPED | OUTPATIENT
Start: 2020-02-28 | End: 2020-03-18 | Stop reason: CLARIF

## 2020-02-28 RX ORDER — OXYCODONE HYDROCHLORIDE 30 MG/1
30 TABLET ORAL
Qty: 60 TAB | Refills: 0 | Status: SHIPPED | OUTPATIENT
Start: 2020-02-28 | End: 2020-03-18 | Stop reason: SDUPTHER

## 2020-03-09 ENCOUNTER — TELEPHONE (OUTPATIENT)
Dept: INTERNAL MEDICINE CLINIC | Age: 75
End: 2020-03-09

## 2020-03-09 NOTE — TELEPHONE ENCOUNTER
Called pt states he has rash on body, wanted to make appointment , appointment made, will wait for PCOP

## 2020-03-18 ENCOUNTER — OFFICE VISIT (OUTPATIENT)
Dept: INTERNAL MEDICINE CLINIC | Age: 75
End: 2020-03-18

## 2020-03-18 VITALS
SYSTOLIC BLOOD PRESSURE: 139 MMHG | WEIGHT: 206 LBS | TEMPERATURE: 98.4 F | HEART RATE: 80 BPM | RESPIRATION RATE: 16 BRPM | HEIGHT: 71 IN | BODY MASS INDEX: 28.84 KG/M2 | OXYGEN SATURATION: 95 % | DIASTOLIC BLOOD PRESSURE: 80 MMHG

## 2020-03-18 DIAGNOSIS — L40.9 PSORIASIS: ICD-10-CM

## 2020-03-18 DIAGNOSIS — L30.9 ECZEMA, UNSPECIFIED TYPE: Primary | ICD-10-CM

## 2020-03-18 DIAGNOSIS — G89.4 CHRONIC PAIN SYNDROME: ICD-10-CM

## 2020-03-18 RX ORDER — ACETAMINOPHEN 500 MG
TABLET ORAL
Qty: 170 G | Refills: 5
Start: 2020-03-18

## 2020-03-18 RX ORDER — OXYCODONE HYDROCHLORIDE 80 MG/1
160 TABLET, FILM COATED, EXTENDED RELEASE ORAL EVERY 12 HOURS
Qty: 120 TAB | Refills: 0 | Status: SHIPPED | OUTPATIENT
Start: 2020-04-25 | End: 2020-05-25

## 2020-03-18 RX ORDER — OXYCODONE HYDROCHLORIDE 30 MG/1
30 TABLET ORAL
Qty: 60 TAB | Refills: 0 | Status: SHIPPED | OUTPATIENT
Start: 2020-03-26 | End: 2020-03-18 | Stop reason: SDUPTHER

## 2020-03-18 RX ORDER — OXYCODONE HYDROCHLORIDE 80 MG/1
160 TABLET, FILM COATED, EXTENDED RELEASE ORAL EVERY 12 HOURS
Qty: 120 TAB | Refills: 0 | Status: SHIPPED | OUTPATIENT
Start: 2020-03-26 | End: 2020-03-18 | Stop reason: SDUPTHER

## 2020-03-18 RX ORDER — OXYCODONE HYDROCHLORIDE 30 MG/1
30 TABLET ORAL
Qty: 60 TAB | Refills: 0 | Status: SHIPPED | OUTPATIENT
Start: 2020-04-25 | End: 2020-05-25
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
## 2020-05-27 ENCOUNTER — OFFICE VISIT (OUTPATIENT)
Dept: INTERNAL MEDICINE CLINIC | Age: 75
End: 2020-05-27

## 2020-05-27 VITALS
SYSTOLIC BLOOD PRESSURE: 152 MMHG | OXYGEN SATURATION: 95 % | HEIGHT: 71 IN | DIASTOLIC BLOOD PRESSURE: 80 MMHG | BODY MASS INDEX: 26.91 KG/M2 | TEMPERATURE: 98.1 F | RESPIRATION RATE: 20 BRPM | WEIGHT: 192.2 LBS | HEART RATE: 90 BPM

## 2020-05-27 DIAGNOSIS — K21.9 GASTROESOPHAGEAL REFLUX DISEASE WITHOUT ESOPHAGITIS: ICD-10-CM

## 2020-05-27 DIAGNOSIS — G89.4 CHRONIC PAIN SYNDROME: Primary | ICD-10-CM

## 2020-05-27 DIAGNOSIS — I10 ESSENTIAL HYPERTENSION: ICD-10-CM

## 2020-05-27 RX ORDER — OXYCODONE HYDROCHLORIDE 80 MG/1
160 TABLET, FILM COATED, EXTENDED RELEASE ORAL EVERY 12 HOURS
Qty: 120 TAB | Refills: 0 | Status: SHIPPED | OUTPATIENT
Start: 2020-06-28 | End: 2020-07-23 | Stop reason: SDUPTHER

## 2020-05-27 RX ORDER — OXYCODONE HYDROCHLORIDE 30 MG/1
30 TABLET ORAL
Qty: 60 TAB | Refills: 0 | Status: SHIPPED | OUTPATIENT
Start: 2020-06-28 | End: 2020-07-23 | Stop reason: SDUPTHER

## 2020-05-27 RX ORDER — OXYCODONE HYDROCHLORIDE 80 MG/1
160 TABLET, FILM COATED, EXTENDED RELEASE ORAL EVERY 12 HOURS
Qty: 120 TAB | Refills: 0 | Status: SHIPPED | OUTPATIENT
Start: 2020-05-29 | End: 2020-06-28

## 2020-05-27 RX ORDER — FAMOTIDINE 20 MG/1
20 TABLET, FILM COATED ORAL 2 TIMES DAILY
Qty: 30 TAB | Refills: 0
Start: 2020-05-27 | End: 2021-07-14 | Stop reason: ALTCHOICE

## 2020-05-27 RX ORDER — OXYCODONE HYDROCHLORIDE 30 MG/1
30 TABLET ORAL
Qty: 60 TAB | Refills: 0 | Status: SHIPPED | OUTPATIENT
Start: 2020-05-29 | End: 2020-06-28
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
## 2020-05-27 NOTE — PROGRESS NOTES
Chief Complaint   Patient presents with    Back Pain     pt want refill of pain medication     1. Have you been to the ER, urgent care clinic since your last visit? Hospitalized since your last visit? No    2. Have you seen or consulted any other health care providers outside of the [address] [place] [address] since your last visit? Include any pap smears or colon screening.  No

## 2020-07-23 DIAGNOSIS — G89.4 CHRONIC PAIN SYNDROME: ICD-10-CM

## 2020-07-23 RX ORDER — OXYCODONE HYDROCHLORIDE 30 MG/1
30 TABLET ORAL
Qty: 60 TAB | Refills: 0 | Status: SHIPPED | OUTPATIENT
Start: 2020-07-23 | End: 2020-09-04 | Stop reason: SDUPTHER

## 2020-07-23 RX ORDER — OXYCODONE HYDROCHLORIDE 80 MG/1
160 TABLET, FILM COATED, EXTENDED RELEASE ORAL EVERY 12 HOURS
Qty: 120 TAB | Refills: 0 | Status: SHIPPED | OUTPATIENT
Start: 2020-07-23 | End: 2020-09-04 | Stop reason: SDUPTHER

## 2020-09-04 DIAGNOSIS — G89.4 CHRONIC PAIN SYNDROME: ICD-10-CM

## 2020-09-04 RX ORDER — OXYCODONE HYDROCHLORIDE 30 MG/1
30 TABLET ORAL
Qty: 60 TAB | Refills: 0 | Status: SHIPPED | OUTPATIENT
Start: 2020-09-04 | End: 2020-09-04 | Stop reason: SDUPTHER

## 2020-09-04 RX ORDER — OXYCODONE HYDROCHLORIDE 30 MG/1
30 TABLET ORAL
Qty: 60 TAB | Refills: 0 | Status: SHIPPED | OUTPATIENT
Start: 2020-09-04 | End: 2020-10-04

## 2020-09-04 RX ORDER — OXYCODONE HYDROCHLORIDE 80 MG/1
80 TABLET, FILM COATED, EXTENDED RELEASE ORAL EVERY 12 HOURS
OUTPATIENT
Start: 2020-09-04 | End: 2020-10-04

## 2020-09-04 RX ORDER — OXYCODONE HYDROCHLORIDE 80 MG/1
160 TABLET, FILM COATED, EXTENDED RELEASE ORAL EVERY 12 HOURS
Qty: 120 TAB | Refills: 0 | Status: SHIPPED | OUTPATIENT
Start: 2020-09-04 | End: 2020-10-05 | Stop reason: SDUPTHER

## 2020-09-04 RX ORDER — OXYCODONE HYDROCHLORIDE 80 MG/1
160 TABLET, FILM COATED, EXTENDED RELEASE ORAL EVERY 12 HOURS
Qty: 120 TAB | Refills: 0 | Status: SHIPPED | OUTPATIENT
Start: 2020-09-04 | End: 2020-09-04 | Stop reason: SDUPTHER

## 2020-09-04 NOTE — TELEPHONE ENCOUNTER
Patient is here now waiting at the screening table asking for a refill of his Chronic Pain Medication, Oxycodone as he states that this was not sent into the pharmacy for him to . His [harmacy is [facility] & he has been on this medication for 10 years.

## 2020-10-05 DIAGNOSIS — G89.4 CHRONIC PAIN SYNDROME: ICD-10-CM

## 2020-10-05 RX ORDER — OXYCODONE HYDROCHLORIDE 80 MG/1
160 TABLET, FILM COATED, EXTENDED RELEASE ORAL EVERY 12 HOURS
Qty: 120 TAB | Refills: 0 | Status: SHIPPED | OUTPATIENT
Start: 2020-10-05 | End: 2020-10-09 | Stop reason: ALTCHOICE

## 2020-10-05 NOTE — TELEPHONE ENCOUNTER
Patient called requesting a refill on his OxyCONTIN 80 MG Tablet. Patient is out of medication and request refill as soon as possible today. [place] [address] [id number] - [facility] [address] AT [address] [place] [address]. [phone]

## 2020-10-08 ENCOUNTER — TELEPHONE (OUTPATIENT)
Dept: INTERNAL MEDICINE CLINIC | Age: 75
End: 2020-10-08

## 2020-10-08 DIAGNOSIS — G89.29 CHRONIC LOW BACK PAIN WITH SCIATICA, SCIATICA LATERALITY UNSPECIFIED, UNSPECIFIED BACK PAIN LATERALITY: ICD-10-CM

## 2020-10-08 DIAGNOSIS — F11.20 NARCOTIC DEPENDENCE (HCC): ICD-10-CM

## 2020-10-08 DIAGNOSIS — G89.4 CHRONIC PAIN SYNDROME: Primary | ICD-10-CM

## 2020-10-08 DIAGNOSIS — M54.40 CHRONIC LOW BACK PAIN WITH SCIATICA, SCIATICA LATERALITY UNSPECIFIED, UNSPECIFIED BACK PAIN LATERALITY: ICD-10-CM

## 2020-10-08 NOTE — TELEPHONE ENCOUNTER
----- Message from Mary Prieto sent at 10/8/2020 11:54 AM EDT ----- Regarding: Dr. Salomón Fay Caller's first and last name: Pt Reason for call: Requesting a lower dosage on medication so that he is able to afford it. Callback required yes/no and why: yes Best contact number(s): [phone] Details to clarify the request: Stated that the Oxycodone medication was prescribed to him for 80mg but he would like for it to be for 30mg so he can pay for it out of pocket. Does not have any more pills. Would also like to discuss his skin issues and see if PCP can prescribe him with an antibiotic.  Has appt scheduled for 10/22 at 1:20pm.

## 2020-10-08 NOTE — TELEPHONE ENCOUNTER
Returned call to pt. He states the Oxycodone 80mg is very expensive and is requesting 30mg be sent in so he can pay for that out of pocket.

## 2020-10-09 RX ORDER — OXYCODONE HYDROCHLORIDE 30 MG/1
150 TABLET ORAL EVERY 8 HOURS
Qty: 180 TAB | Refills: 0 | Status: SHIPPED | OUTPATIENT
Start: 2020-10-09 | End: 2020-10-22 | Stop reason: SDUPTHER

## 2020-10-09 NOTE — TELEPHONE ENCOUNTER
Patient with chronic pain, maintained on extremely high narcotic dose long-term, as prescribed by pain management in the past.   
Currently takes OxyContin ER 80 mg 2 pills twice daily and oxycodone 30 mg twice daily as needed breakthrough pain. Patient is requesting change to only oxycodone due to cost.   
 reviewed and pharmacy records reviewed. He last filled OxyContin ER 80 mg #120 9/4/20. He last filled oxycodone 30 mg #60 9/26/20. He has been compliant with care and urine drug screens have been appropriate. Patient has an appointment on 10/22 for medication review. For now, will send in a 12-day prescription of oxycodone for chronic pain to replace his OxyContin. Prescription is for oxycodone 150 mg total 3 times daily. This is 30 mg tablets, take 5 tab every 8 hours. #180 for 12 days. I do not feel comfortable prescribing this high dose of short acting oxycodone or narcotics long-term. I have been previously been unable to  find a reputable pain management doctor to resume his care. Will discuss transition to pain management OR relatively rapid weaning of his dose over the next 3 months at his visit. He has very high narcotic tolerance. This is technically a lower dose than he was on previously.

## 2020-10-22 ENCOUNTER — OFFICE VISIT (OUTPATIENT)
Dept: INTERNAL MEDICINE CLINIC | Age: 75
End: 2020-10-22
Payer: MEDICARE

## 2020-10-22 VITALS
RESPIRATION RATE: 12 BRPM | OXYGEN SATURATION: 96 % | WEIGHT: 187 LBS | SYSTOLIC BLOOD PRESSURE: 160 MMHG | HEIGHT: 71 IN | BODY MASS INDEX: 26.18 KG/M2 | DIASTOLIC BLOOD PRESSURE: 100 MMHG | HEART RATE: 107 BPM | TEMPERATURE: 98.9 F

## 2020-10-22 DIAGNOSIS — F33.1 MODERATE EPISODE OF RECURRENT MAJOR DEPRESSIVE DISORDER (HCC): ICD-10-CM

## 2020-10-22 DIAGNOSIS — G89.29 CHRONIC LOW BACK PAIN WITH SCIATICA, SCIATICA LATERALITY UNSPECIFIED, UNSPECIFIED BACK PAIN LATERALITY: ICD-10-CM

## 2020-10-22 DIAGNOSIS — I10 ESSENTIAL HYPERTENSION: ICD-10-CM

## 2020-10-22 DIAGNOSIS — G99.2 STENOSIS OF CERVICAL SPINE WITH MYELOPATHY (HCC): Primary | ICD-10-CM

## 2020-10-22 DIAGNOSIS — M48.02 STENOSIS OF CERVICAL SPINE WITH MYELOPATHY (HCC): Primary | ICD-10-CM

## 2020-10-22 DIAGNOSIS — G89.4 CHRONIC PAIN SYNDROME: ICD-10-CM

## 2020-10-22 DIAGNOSIS — M54.40 CHRONIC LOW BACK PAIN WITH SCIATICA, SCIATICA LATERALITY UNSPECIFIED, UNSPECIFIED BACK PAIN LATERALITY: ICD-10-CM

## 2020-10-22 DIAGNOSIS — F11.20 NARCOTIC DEPENDENCE (HCC): ICD-10-CM

## 2020-10-22 DIAGNOSIS — L30.8 PSORIASIFORM DERMATITIS: ICD-10-CM

## 2020-10-22 PROCEDURE — G8427 DOCREV CUR MEDS BY ELIG CLIN: HCPCS | Performed by: INTERNAL MEDICINE

## 2020-10-22 PROCEDURE — G8753 SYS BP > OR = 140: HCPCS | Performed by: INTERNAL MEDICINE

## 2020-10-22 PROCEDURE — G8536 NO DOC ELDER MAL SCRN: HCPCS | Performed by: INTERNAL MEDICINE

## 2020-10-22 PROCEDURE — G8419 CALC BMI OUT NRM PARAM NOF/U: HCPCS | Performed by: INTERNAL MEDICINE

## 2020-10-22 PROCEDURE — 3017F COLORECTAL CA SCREEN DOC REV: CPT | Performed by: INTERNAL MEDICINE

## 2020-10-22 PROCEDURE — 99213 OFFICE O/P EST LOW 20 MIN: CPT | Performed by: INTERNAL MEDICINE

## 2020-10-22 PROCEDURE — G9717 DOC PT DX DEP/BP F/U NT REQ: HCPCS | Performed by: INTERNAL MEDICINE

## 2020-10-22 PROCEDURE — G8755 DIAS BP > OR = 90: HCPCS | Performed by: INTERNAL MEDICINE

## 2020-10-22 PROCEDURE — G0463 HOSPITAL OUTPT CLINIC VISIT: HCPCS | Performed by: INTERNAL MEDICINE

## 2020-10-22 PROCEDURE — 1101F PT FALLS ASSESS-DOCD LE1/YR: CPT | Performed by: INTERNAL MEDICINE

## 2020-10-22 RX ORDER — OLMESARTAN MEDOXOMIL 40 MG/1
40 TABLET ORAL DAILY
Qty: 90 TAB | Refills: 1 | Status: SHIPPED | OUTPATIENT
Start: 2020-10-22 | End: 2021-04-13 | Stop reason: SDUPTHER

## 2020-10-22 RX ORDER — CHLORTHALIDONE 25 MG/1
25 TABLET ORAL DAILY
Qty: 90 TAB | Refills: 1 | Status: SHIPPED | OUTPATIENT
Start: 2020-10-22 | End: 2021-07-14 | Stop reason: ALTCHOICE

## 2020-10-22 RX ORDER — OXYCODONE HYDROCHLORIDE 30 MG/1
60 TABLET ORAL
Qty: 180 TAB | Refills: 0 | Status: SHIPPED | OUTPATIENT
Start: 2020-10-22 | End: 2020-11-18 | Stop reason: SDUPTHER

## 2020-10-22 RX ORDER — OXYCODONE HYDROCHLORIDE 80 MG/1
80 TABLET, FILM COATED, EXTENDED RELEASE ORAL EVERY 12 HOURS
Qty: 60 TAB | Refills: 0 | Status: SHIPPED | OUTPATIENT
Start: 2020-10-22 | End: 2020-11-30 | Stop reason: SDUPTHER

## 2020-10-22 RX ORDER — CLONIDINE HYDROCHLORIDE 0.2 MG/1
0.2 TABLET ORAL 2 TIMES DAILY
Qty: 180 TAB | Refills: 1 | Status: SHIPPED | OUTPATIENT
Start: 2020-10-22 | End: 2021-09-15 | Stop reason: ALTCHOICE

## 2020-10-22 RX ORDER — PERMETHRIN 50 MG/G
CREAM TOPICAL
Qty: 60 G | Refills: 3 | Status: SHIPPED | OUTPATIENT
Start: 2020-10-22 | End: 2021-07-14 | Stop reason: SDUPTHER

## 2020-10-22 RX ORDER — SPIRONOLACTONE 25 MG/1
25 TABLET ORAL DAILY
Qty: 90 TAB | Refills: 1 | Status: SHIPPED | OUTPATIENT
Start: 2020-10-22 | End: 2020-11-30 | Stop reason: ALTCHOICE
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
## 2020-10-27 ENCOUNTER — TELEPHONE (OUTPATIENT)
Dept: INTERNAL MEDICINE CLINIC | Age: 75
End: 2020-10-27

## 2020-10-27 NOTE — TELEPHONE ENCOUNTER
Nurse checked status of [facility] (Key# [id number]) for Oxycontin 80mg ER tablets, but submission would not go through as patient's [address] [place] [address] Prescription Drug plan eligibility could not be verified. Nurse called [employer] & obtained [address] [place] [address] Provider Services # [phone]. Nurse spoke with [address] [place] [address] Rep & it was determined that patient's [address] [place] [address] Prescription drug plan does not become effective until 12/1/2020 [facility] ID# [id number] [facility] 45: [id number], PCN: [id number], Grp: ). Nurse notified [facility] Pharmacist who updated patient's account & informed nurse that former prescription drug plans have been archived. Nurse spoke with patient who was aware that this [address] [place] [address] Ave will not be effective until 12/1/2020; he shared that this drug plan situation has been confusing. Patient will go by [employer] & ask them to hold the Oxycontin 80mg ER tablets until [address] [place] [address] goes into effect b/c the medication is expensive. Patient states that he will fill his Oxycodone prescription now as this is more affordable. Patient verbalized appreciation of assistance.

## 2020-11-18 DIAGNOSIS — G89.29 CHRONIC LOW BACK PAIN WITH SCIATICA, SCIATICA LATERALITY UNSPECIFIED, UNSPECIFIED BACK PAIN LATERALITY: ICD-10-CM

## 2020-11-18 DIAGNOSIS — M54.40 CHRONIC LOW BACK PAIN WITH SCIATICA, SCIATICA LATERALITY UNSPECIFIED, UNSPECIFIED BACK PAIN LATERALITY: ICD-10-CM

## 2020-11-18 DIAGNOSIS — F11.20 NARCOTIC DEPENDENCE (HCC): ICD-10-CM

## 2020-11-18 DIAGNOSIS — G89.4 CHRONIC PAIN SYNDROME: ICD-10-CM

## 2020-11-18 NOTE — TELEPHONE ENCOUNTER
----- Message from [place] sent at 11/18/2020 10:52 AM EST -----  Regarding: Dr. Fink Pulse (if not patient): n/a      Relationship of caller (if not patient): n/a      Best contact number(s):[phone]      Name of medication and dosage if known: Oxycodone 30 mg       Is patient out of this medication (yes/no): no      Pharmacy name: Doc at [facility] and [place] listed in chart? (yes/no): no  Pharmacy phone number: unknown      Details to clarify the request: Pt was prescribed Oxycodone 30 mg and Oxycontin 80 mg on 10/22/20. Pt did  the Oxycodone 30 mg and not the Oxycontin 80 mg because at the time he did not have Part B coverage to cover the 2nd rx. Pt will be getting Part B effective 12/1/20. Pt used medication quicker due to not picking up second rx.         [place]

## 2020-11-19 RX ORDER — OXYCODONE HYDROCHLORIDE 30 MG/1
60 TABLET ORAL EVERY 6 HOURS
Qty: 96 TAB | Refills: 0 | Status: SHIPPED | OUTPATIENT
Start: 2020-11-19 | End: 2020-12-01

## 2020-11-19 NOTE — TELEPHONE ENCOUNTER
I have reviewed . Had not filled oxycontin since 9/4/20 due to coverage. Filled oxycodone #180 10/27, 10/10. I sent in refill of oxycodone 30 mg, take increased dose 2 pills every 6 hours for the next 11 days 11/19/20-11/30/20 #96 pills  On 12/1/20, he will refill oxycontin bid (should have rx) since he will have coverage and will I will reduce oxycodone 30 mg bid prn    He needs appt 11/30 for refills and blood pressure check. Let me know that we contacted the pharmacy and discovered he was NOT taking his BP pills. Failure to take other medications for his health is poor judgement and will result in mean having to wean off narcotic therapy due to non-compliance with medical recommendations.

## 2020-11-30 ENCOUNTER — OFFICE VISIT (OUTPATIENT)
Dept: INTERNAL MEDICINE CLINIC | Age: 75
End: 2020-11-30
Payer: MEDICARE

## 2020-11-30 VITALS
RESPIRATION RATE: 12 BRPM | HEIGHT: 71 IN | TEMPERATURE: 98.4 F | BODY MASS INDEX: 27.07 KG/M2 | OXYGEN SATURATION: 97 % | SYSTOLIC BLOOD PRESSURE: 151 MMHG | WEIGHT: 193.4 LBS | HEART RATE: 107 BPM | DIASTOLIC BLOOD PRESSURE: 99 MMHG

## 2020-11-30 DIAGNOSIS — F11.20 NARCOTIC DEPENDENCE (HCC): ICD-10-CM

## 2020-11-30 DIAGNOSIS — G89.29 CHRONIC LOW BACK PAIN WITH SCIATICA, SCIATICA LATERALITY UNSPECIFIED, UNSPECIFIED BACK PAIN LATERALITY: ICD-10-CM

## 2020-11-30 DIAGNOSIS — G99.2 STENOSIS OF CERVICAL SPINE WITH MYELOPATHY (HCC): Primary | ICD-10-CM

## 2020-11-30 DIAGNOSIS — M48.02 STENOSIS OF CERVICAL SPINE WITH MYELOPATHY (HCC): Primary | ICD-10-CM

## 2020-11-30 DIAGNOSIS — G89.4 CHRONIC PAIN SYNDROME: ICD-10-CM

## 2020-11-30 DIAGNOSIS — I10 ESSENTIAL HYPERTENSION: ICD-10-CM

## 2020-11-30 DIAGNOSIS — M54.40 CHRONIC LOW BACK PAIN WITH SCIATICA, SCIATICA LATERALITY UNSPECIFIED, UNSPECIFIED BACK PAIN LATERALITY: ICD-10-CM

## 2020-11-30 PROCEDURE — G8753 SYS BP > OR = 140: HCPCS | Performed by: INTERNAL MEDICINE

## 2020-11-30 PROCEDURE — G8536 NO DOC ELDER MAL SCRN: HCPCS | Performed by: INTERNAL MEDICINE

## 2020-11-30 PROCEDURE — G0463 HOSPITAL OUTPT CLINIC VISIT: HCPCS | Performed by: INTERNAL MEDICINE

## 2020-11-30 PROCEDURE — 99213 OFFICE O/P EST LOW 20 MIN: CPT | Performed by: INTERNAL MEDICINE

## 2020-11-30 PROCEDURE — 3017F COLORECTAL CA SCREEN DOC REV: CPT | Performed by: INTERNAL MEDICINE

## 2020-11-30 PROCEDURE — G8427 DOCREV CUR MEDS BY ELIG CLIN: HCPCS | Performed by: INTERNAL MEDICINE

## 2020-11-30 PROCEDURE — G9717 DOC PT DX DEP/BP F/U NT REQ: HCPCS | Performed by: INTERNAL MEDICINE

## 2020-11-30 PROCEDURE — G8755 DIAS BP > OR = 90: HCPCS | Performed by: INTERNAL MEDICINE

## 2020-11-30 PROCEDURE — G8419 CALC BMI OUT NRM PARAM NOF/U: HCPCS | Performed by: INTERNAL MEDICINE

## 2020-11-30 PROCEDURE — 1101F PT FALLS ASSESS-DOCD LE1/YR: CPT | Performed by: INTERNAL MEDICINE

## 2020-11-30 RX ORDER — OXYCODONE HYDROCHLORIDE 30 MG/1
30 TABLET ORAL
Qty: 120 TAB | Refills: 0 | Status: SHIPPED | OUTPATIENT
Start: 2020-11-30 | End: 2020-12-29 | Stop reason: SDUPTHER

## 2020-11-30 RX ORDER — OXYCODONE HYDROCHLORIDE 30 MG/1
60 TABLET ORAL
Qty: 120 TAB | Refills: 0 | Status: SHIPPED | OUTPATIENT
Start: 2020-11-30 | End: 2020-11-30 | Stop reason: CLARIF

## 2020-11-30 RX ORDER — OXYCODONE HYDROCHLORIDE 80 MG/1
80 TABLET, FILM COATED, EXTENDED RELEASE ORAL EVERY 12 HOURS
Qty: 60 TAB | Refills: 0 | Status: SHIPPED | OUTPATIENT
Start: 2020-12-01 | End: 2020-11-30 | Stop reason: SDUPTHER

## 2020-11-30 RX ORDER — OXYCODONE HYDROCHLORIDE 80 MG/1
80 TABLET, FILM COATED, EXTENDED RELEASE ORAL EVERY 12 HOURS
Qty: 60 TAB | Refills: 0 | Status: SHIPPED | OUTPATIENT
Start: 2020-12-01 | End: 2020-12-29 | Stop reason: SDUPTHER

## 2020-12-01 LAB
ALBUMIN SERPL-MCNC: 3.5 G/DL (ref 3.5–5)
ALBUMIN/GLOB SERPL: 1.1 {RATIO} (ref 1.1–2.2)
ALP SERPL-CCNC: 92 U/L (ref 45–117)
ALT SERPL-CCNC: 34 U/L (ref 12–78)
ANION GAP SERPL CALC-SCNC: 10 MMOL/L (ref 5–15)
AST SERPL-CCNC: 46 U/L (ref 15–37)
BILIRUB SERPL-MCNC: 0.6 MG/DL (ref 0.2–1)
BUN SERPL-MCNC: 14 MG/DL (ref 6–20)
BUN/CREAT SERPL: 12 (ref 12–20)
CALCIUM SERPL-MCNC: 8.4 MG/DL (ref 8.5–10.1)
CHLORIDE SERPL-SCNC: 98 MMOL/L (ref 97–108)
CO2 SERPL-SCNC: 29 MMOL/L (ref 21–32)
CREAT SERPL-MCNC: 1.13 MG/DL (ref 0.7–1.3)
ERYTHROCYTE [DISTWIDTH] IN BLOOD BY AUTOMATED COUNT: 13 % (ref 11.5–14.5)
GLOBULIN SER CALC-MCNC: 3.2 G/DL (ref 2–4)
GLUCOSE SERPL-MCNC: 112 MG/DL (ref 65–100)
HCT VFR BLD AUTO: 42.8 % (ref 36.6–50.3)
HGB BLD-MCNC: 14.3 G/DL (ref 12.1–17)
MCH RBC QN AUTO: 34 PG (ref 26–34)
MCHC RBC AUTO-ENTMCNC: 33.4 G/DL (ref 30–36.5)
MCV RBC AUTO: 101.9 FL (ref 80–99)
NRBC # BLD: 0 K/UL (ref 0–0.01)
NRBC BLD-RTO: 0 PER 100 WBC
PLATELET # BLD AUTO: 259 K/UL (ref 150–400)
PMV BLD AUTO: 10.2 FL (ref 8.9–12.9)
POTASSIUM SERPL-SCNC: 3.9 MMOL/L (ref 3.5–5.1)
PROT SERPL-MCNC: 6.7 G/DL (ref 6.4–8.2)
RBC # BLD AUTO: 4.2 M/UL (ref 4.1–5.7)
SODIUM SERPL-SCNC: 137 MMOL/L (ref 136–145)
TSH SERPL DL<=0.05 MIU/L-ACNC: 0.84 UIU/ML (ref 0.36–3.74)
WBC # BLD AUTO: 9.4 K/UL (ref 4.1–11.1)
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
## 2020-12-29 DIAGNOSIS — M54.40 CHRONIC LOW BACK PAIN WITH SCIATICA, SCIATICA LATERALITY UNSPECIFIED, UNSPECIFIED BACK PAIN LATERALITY: ICD-10-CM

## 2020-12-29 DIAGNOSIS — F11.20 NARCOTIC DEPENDENCE (HCC): ICD-10-CM

## 2020-12-29 DIAGNOSIS — G89.4 CHRONIC PAIN SYNDROME: ICD-10-CM

## 2020-12-29 DIAGNOSIS — G99.2 STENOSIS OF CERVICAL SPINE WITH MYELOPATHY (HCC): ICD-10-CM

## 2020-12-29 DIAGNOSIS — G89.29 CHRONIC LOW BACK PAIN WITH SCIATICA, SCIATICA LATERALITY UNSPECIFIED, UNSPECIFIED BACK PAIN LATERALITY: ICD-10-CM

## 2020-12-29 DIAGNOSIS — M48.02 STENOSIS OF CERVICAL SPINE WITH MYELOPATHY (HCC): ICD-10-CM

## 2020-12-29 NOTE — TELEPHONE ENCOUNTER
2018    Parents of Agnes [address] Orien Rubinstein [address] [facility] [address]      RE: Normal Results of Diagnostic Testing   Metabolic Screening    Dear parent of Carrol Cardenas:    Your child's physician ordered testing to be done to assist Per patient he is going to run out of medication very soon, states a  PA is required on his  80 mg,    Requesting PA be faxed to [employer] # [phone]

## 2020-12-30 RX ORDER — OXYCODONE HYDROCHLORIDE 30 MG/1
30 TABLET ORAL
Qty: 120 TAB | Refills: 0 | Status: SHIPPED | OUTPATIENT
Start: 2020-12-30 | End: 2021-01-29 | Stop reason: SDUPTHER

## 2020-12-30 RX ORDER — OXYCODONE HYDROCHLORIDE 80 MG/1
80 TABLET, FILM COATED, EXTENDED RELEASE ORAL EVERY 12 HOURS
Qty: 60 TAB | Refills: 0 | Status: SHIPPED | OUTPATIENT
Start: 2020-12-30 | End: 2021-01-29 | Stop reason: SDUPTHER

## 2020-12-30 NOTE — TELEPHONE ENCOUNTER
Refills due today and send for oxycodone and oxycontin. Chronic medications. Please do prior auth if needed?

## 2021-01-29 DIAGNOSIS — G99.2 STENOSIS OF CERVICAL SPINE WITH MYELOPATHY (HCC): ICD-10-CM

## 2021-01-29 DIAGNOSIS — M54.40 CHRONIC LOW BACK PAIN WITH SCIATICA, SCIATICA LATERALITY UNSPECIFIED, UNSPECIFIED BACK PAIN LATERALITY: ICD-10-CM

## 2021-01-29 DIAGNOSIS — G89.4 CHRONIC PAIN SYNDROME: ICD-10-CM

## 2021-01-29 DIAGNOSIS — M48.02 STENOSIS OF CERVICAL SPINE WITH MYELOPATHY (HCC): ICD-10-CM

## 2021-01-29 DIAGNOSIS — F11.20 NARCOTIC DEPENDENCE (HCC): ICD-10-CM

## 2021-01-29 DIAGNOSIS — G89.29 CHRONIC LOW BACK PAIN WITH SCIATICA, SCIATICA LATERALITY UNSPECIFIED, UNSPECIFIED BACK PAIN LATERALITY: ICD-10-CM

## 2021-01-29 NOTE — TELEPHONE ENCOUNTER
----- Message from Jacqueline Bell sent at 1/29/2021 10:27 AM EST ----- Regarding: Dr. Santosh Levin Contact: [phone] Medication Refill Caller (if not patient): Pt.  
 
 
Relationship of caller (if not patient): N/a. Best contact number(s): [phone] Name of medication and dosage if known: \"Oxycotin\" 120- 30 mg, \"Oxycontin\" 60- 80 mg. Is patient out of this medication (yes/no): No.  
 
 
Pharmacy name: [facility]. Pharmacy listed in chart? (yes/no): Yes. Pharmacy phone number: [phone] Details to clarify the request: Will be out as of 1/30/2021. Jacqueline Bell

## 2021-02-01 RX ORDER — OXYCODONE HYDROCHLORIDE 80 MG/1
80 TABLET, FILM COATED, EXTENDED RELEASE ORAL EVERY 12 HOURS
Qty: 60 TAB | Refills: 0 | Status: SHIPPED | OUTPATIENT
Start: 2021-02-01 | End: 2021-02-10 | Stop reason: ALTCHOICE

## 2021-02-01 RX ORDER — OXYCODONE HYDROCHLORIDE 30 MG/1
30 TABLET ORAL
Qty: 120 TAB | Refills: 0 | Status: SHIPPED | OUTPATIENT
Start: 2021-02-01 | End: 2021-02-25 | Stop reason: SDUPTHER

## 2021-02-01 NOTE — TELEPHONE ENCOUNTER
----- Message from [employer] sent at 1/30/2021  9:43 AM EST ----- Regarding: Dr. Anna Saldivar Level 1/Escalated Issue Caller's first and last name and relationship (if not the patient): pt 
 
 
Best contact number(s): [phone] What are the symptoms: Pain in lower back, neck and all over, rated at a 10 Transfer successful - yes/no (include outcome): yes/answering service Transfer declined - yes/no (include reason): no Was caller advised to seek appropriate level of care - yes/no: yes Details to clarify the request: Pt called 1/29/21 for medication refills. [employer]

## 2021-02-02 ENCOUNTER — TELEPHONE (OUTPATIENT)
Dept: INTERNAL MEDICINE CLINIC | Age: 76
End: 2021-02-02

## 2021-02-02 NOTE — TELEPHONE ENCOUNTER
Received fax from [employer] that Oxycontin needs prior auth. Located a approval for this medication until 02/25/21. Faxed copy to Doc.

## 2021-02-03 ENCOUNTER — DOCUMENTATION ONLY (OUTPATIENT)
Dept: INTERNAL MEDICINE CLINIC | Age: 76
End: 2021-02-03

## 2021-02-10 ENCOUNTER — OFFICE VISIT (OUTPATIENT)
Dept: INTERNAL MEDICINE CLINIC | Age: 76
End: 2021-02-10
Payer: MEDICARE

## 2021-02-10 VITALS
HEIGHT: 71 IN | RESPIRATION RATE: 12 BRPM | OXYGEN SATURATION: 99 % | SYSTOLIC BLOOD PRESSURE: 100 MMHG | BODY MASS INDEX: 27.47 KG/M2 | WEIGHT: 196.2 LBS | DIASTOLIC BLOOD PRESSURE: 65 MMHG | HEART RATE: 90 BPM

## 2021-02-10 DIAGNOSIS — I10 ESSENTIAL HYPERTENSION: ICD-10-CM

## 2021-02-10 DIAGNOSIS — F11.20 NARCOTIC DEPENDENCE (HCC): ICD-10-CM

## 2021-02-10 DIAGNOSIS — G99.2 STENOSIS OF CERVICAL SPINE WITH MYELOPATHY (HCC): ICD-10-CM

## 2021-02-10 DIAGNOSIS — M54.40 CHRONIC LOW BACK PAIN WITH SCIATICA, SCIATICA LATERALITY UNSPECIFIED, UNSPECIFIED BACK PAIN LATERALITY: ICD-10-CM

## 2021-02-10 DIAGNOSIS — M48.02 STENOSIS OF CERVICAL SPINE WITH MYELOPATHY (HCC): ICD-10-CM

## 2021-02-10 DIAGNOSIS — G89.29 CHRONIC LOW BACK PAIN WITH SCIATICA, SCIATICA LATERALITY UNSPECIFIED, UNSPECIFIED BACK PAIN LATERALITY: ICD-10-CM

## 2021-02-10 DIAGNOSIS — G89.4 CHRONIC PAIN SYNDROME: Primary | ICD-10-CM

## 2021-02-10 DIAGNOSIS — F33.1 MODERATE EPISODE OF RECURRENT MAJOR DEPRESSIVE DISORDER (HCC): ICD-10-CM

## 2021-02-10 PROCEDURE — G8419 CALC BMI OUT NRM PARAM NOF/U: HCPCS | Performed by: INTERNAL MEDICINE

## 2021-02-10 PROCEDURE — G8752 SYS BP LESS 140: HCPCS | Performed by: INTERNAL MEDICINE

## 2021-02-10 PROCEDURE — G8754 DIAS BP LESS 90: HCPCS | Performed by: INTERNAL MEDICINE

## 2021-02-10 PROCEDURE — G8536 NO DOC ELDER MAL SCRN: HCPCS | Performed by: INTERNAL MEDICINE

## 2021-02-10 PROCEDURE — 3017F COLORECTAL CA SCREEN DOC REV: CPT | Performed by: INTERNAL MEDICINE

## 2021-02-10 PROCEDURE — 1101F PT FALLS ASSESS-DOCD LE1/YR: CPT | Performed by: INTERNAL MEDICINE

## 2021-02-10 PROCEDURE — G8427 DOCREV CUR MEDS BY ELIG CLIN: HCPCS | Performed by: INTERNAL MEDICINE

## 2021-02-10 PROCEDURE — G0463 HOSPITAL OUTPT CLINIC VISIT: HCPCS | Performed by: INTERNAL MEDICINE

## 2021-02-10 PROCEDURE — G9717 DOC PT DX DEP/BP F/U NT REQ: HCPCS | Performed by: INTERNAL MEDICINE

## 2021-02-10 PROCEDURE — 99213 OFFICE O/P EST LOW 20 MIN: CPT | Performed by: INTERNAL MEDICINE

## 2021-02-10 RX ORDER — MORPHINE SULFATE 100 MG/1
100 TABLET, FILM COATED, EXTENDED RELEASE ORAL EVERY 12 HOURS
Qty: 30 TAB | Refills: 0 | Status: SHIPPED | OUTPATIENT
Start: 2021-02-10 | End: 2021-02-25 | Stop reason: SDUPTHER

## 2021-02-10 RX ORDER — MUPIROCIN 20 MG/G
OINTMENT TOPICAL
COMMUNITY
Start: 2021-01-28 | End: 2021-04-13 | Stop reason: ALTCHOICE

## 2021-02-10 RX ORDER — TRIAMCINOLONE ACETONIDE 1 MG/G
OINTMENT TOPICAL
COMMUNITY
Start: 2021-01-27 | End: 2021-07-14 | Stop reason: ALTCHOICE

## 2021-02-10 RX ORDER — NALOXONE HYDROCHLORIDE 4 MG/.1ML
SPRAY NASAL
Qty: 1 EACH | Refills: 2 | Status: SHIPPED | OUTPATIENT
Start: 2021-02-10 | End: 2021-09-15 | Stop reason: ALTCHOICE

## 2021-02-10 RX ORDER — CLOBETASOL PROPIONATE 0.5 MG/G
OINTMENT TOPICAL
COMMUNITY
Start: 2021-01-27 | End: 2021-10-27
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
## 2021-02-25 DIAGNOSIS — G89.4 CHRONIC PAIN SYNDROME: ICD-10-CM

## 2021-02-25 DIAGNOSIS — M54.40 CHRONIC LOW BACK PAIN WITH SCIATICA, SCIATICA LATERALITY UNSPECIFIED, UNSPECIFIED BACK PAIN LATERALITY: ICD-10-CM

## 2021-02-25 DIAGNOSIS — F33.1 MODERATE EPISODE OF RECURRENT MAJOR DEPRESSIVE DISORDER (HCC): ICD-10-CM

## 2021-02-25 DIAGNOSIS — F11.20 NARCOTIC DEPENDENCE (HCC): ICD-10-CM

## 2021-02-25 DIAGNOSIS — G99.2 STENOSIS OF CERVICAL SPINE WITH MYELOPATHY (HCC): ICD-10-CM

## 2021-02-25 DIAGNOSIS — M48.02 STENOSIS OF CERVICAL SPINE WITH MYELOPATHY (HCC): ICD-10-CM

## 2021-02-25 DIAGNOSIS — G89.29 CHRONIC LOW BACK PAIN WITH SCIATICA, SCIATICA LATERALITY UNSPECIFIED, UNSPECIFIED BACK PAIN LATERALITY: ICD-10-CM

## 2021-02-25 RX ORDER — OXYCODONE HYDROCHLORIDE 30 MG/1
30 TABLET ORAL
Qty: 120 TAB | Refills: 0 | Status: SHIPPED | OUTPATIENT
Start: 2021-02-25 | End: 2021-03-27

## 2021-02-25 RX ORDER — MORPHINE SULFATE 100 MG/1
100 TABLET, FILM COATED, EXTENDED RELEASE ORAL EVERY 12 HOURS
Qty: 60 TAB | Refills: 0 | Status: SHIPPED | OUTPATIENT
Start: 2021-02-25 | End: 2021-03-05 | Stop reason: ALTCHOICE

## 2021-02-25 RX ORDER — OXYCODONE HYDROCHLORIDE 30 MG/1
30 TABLET ORAL
Qty: 120 TAB | Refills: 0 | Status: CANCELLED | OUTPATIENT
Start: 2021-02-25 | End: 2021-03-27

## 2021-02-25 NOTE — TELEPHONE ENCOUNTER
Refills sent. Due to formulary, we changed Oxycontin 80 mg bid to MSContin 100 mg bid 2 weeks ago. [employer] Refiled #[id number]  Refilled oxycodone 30 mg every 6 h prn for breakthrough pain.  #120

## 2021-02-25 NOTE — TELEPHONE ENCOUNTER
[address] [facility], [address] [facility] [address]             Caller (if not patient): Pt   Relationship of caller (if not patient):N/A   Best contact number(s): [phone]   Name of medication and dosage if known: Oxycontin 80mg   Is patient out of this medication (yes/no): Yes   Pharmacy name: [address] [place] [address] listed in chart? (yes/no): Yes   Pharmacy phone number: [phone]   Date of last visit: n/a   Details to clarify the request: Pt advised Insurance is requesting approval from PCP

## 2021-03-05 ENCOUNTER — OFFICE VISIT (OUTPATIENT)
Dept: INTERNAL MEDICINE CLINIC | Age: 76
End: 2021-03-05
Payer: MEDICARE

## 2021-03-05 VITALS
OXYGEN SATURATION: 96 % | TEMPERATURE: 98.3 F | SYSTOLIC BLOOD PRESSURE: 146 MMHG | WEIGHT: 192.8 LBS | RESPIRATION RATE: 12 BRPM | HEIGHT: 71 IN | HEART RATE: 97 BPM | BODY MASS INDEX: 26.99 KG/M2 | DIASTOLIC BLOOD PRESSURE: 97 MMHG

## 2021-03-05 DIAGNOSIS — G89.29 CHRONIC LOW BACK PAIN WITH SCIATICA, SCIATICA LATERALITY UNSPECIFIED, UNSPECIFIED BACK PAIN LATERALITY: ICD-10-CM

## 2021-03-05 DIAGNOSIS — M54.40 CHRONIC LOW BACK PAIN WITH SCIATICA, SCIATICA LATERALITY UNSPECIFIED, UNSPECIFIED BACK PAIN LATERALITY: ICD-10-CM

## 2021-03-05 DIAGNOSIS — G99.2 STENOSIS OF CERVICAL SPINE WITH MYELOPATHY (HCC): ICD-10-CM

## 2021-03-05 DIAGNOSIS — M48.02 STENOSIS OF CERVICAL SPINE WITH MYELOPATHY (HCC): ICD-10-CM

## 2021-03-05 DIAGNOSIS — F11.20 NARCOTIC DEPENDENCE (HCC): ICD-10-CM

## 2021-03-05 DIAGNOSIS — G89.4 CHRONIC PAIN SYNDROME: ICD-10-CM

## 2021-03-05 PROCEDURE — G8419 CALC BMI OUT NRM PARAM NOF/U: HCPCS | Performed by: INTERNAL MEDICINE

## 2021-03-05 PROCEDURE — 99213 OFFICE O/P EST LOW 20 MIN: CPT | Performed by: INTERNAL MEDICINE

## 2021-03-05 PROCEDURE — G8755 DIAS BP > OR = 90: HCPCS | Performed by: INTERNAL MEDICINE

## 2021-03-05 PROCEDURE — G0463 HOSPITAL OUTPT CLINIC VISIT: HCPCS | Performed by: INTERNAL MEDICINE

## 2021-03-05 PROCEDURE — 3017F COLORECTAL CA SCREEN DOC REV: CPT | Performed by: INTERNAL MEDICINE

## 2021-03-05 PROCEDURE — G8536 NO DOC ELDER MAL SCRN: HCPCS | Performed by: INTERNAL MEDICINE

## 2021-03-05 PROCEDURE — G8427 DOCREV CUR MEDS BY ELIG CLIN: HCPCS | Performed by: INTERNAL MEDICINE

## 2021-03-05 PROCEDURE — 1101F PT FALLS ASSESS-DOCD LE1/YR: CPT | Performed by: INTERNAL MEDICINE

## 2021-03-05 PROCEDURE — G8753 SYS BP > OR = 140: HCPCS | Performed by: INTERNAL MEDICINE

## 2021-03-05 PROCEDURE — G9717 DOC PT DX DEP/BP F/U NT REQ: HCPCS | Performed by: INTERNAL MEDICINE

## 2021-03-05 RX ORDER — OXYCODONE 36 MG/1
72 CAPSULE, EXTENDED RELEASE ORAL EVERY 12 HOURS
Qty: 120 CAP | Refills: 0 | Status: SHIPPED | OUTPATIENT
Start: 2021-03-05 | End: 2021-04-04
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
## 2021-03-19 ENCOUNTER — TELEPHONE (OUTPATIENT)
Dept: INTERNAL MEDICINE CLINIC | Age: 76
End: 2021-03-19

## 2021-03-19 NOTE — TELEPHONE ENCOUNTER
It is also oxycodone which is a med he has tolerated for years. Try to divide it up to 1 pill every 8 hours instead of 2 pills bid.   If not tolerated, will need prior auth for Oxycontin

## 2021-03-19 NOTE — TELEPHONE ENCOUNTER
Received call from pt. He states about 30-45 min after taking Adine Felty he experiences increased heart rate, swollen feet and his shoes dont fit on his feet. Abd upset, bilat eye pain, and feels \"drunk off a tree\". Please advise of recommendations.  Cell [phone]

## 2021-04-05 ENCOUNTER — TELEPHONE (OUTPATIENT)
Dept: INTERNAL MEDICINE CLINIC | Age: 76
End: 2021-04-05

## 2021-04-05 NOTE — TELEPHONE ENCOUNTER
I spoke to the emergency room doctor at [facility]. Patient presented there with nausea and vomiting. Elevated lactate on presentation. Patient said that his pain was uncontrolled so he took Narcan. Nausea vomiting started after that. Unclear if he is taking Xtampa or just short acting oxycodone. I reviewed medication list with the emergency room doctor. Unclear if he is taking BP meds. They will admit him and observe overnight. Abd CT to be done. Patient was scheduled for an appointment today at 3 PM.  This was canceled and we will reschedule.

## 2021-04-13 ENCOUNTER — OFFICE VISIT (OUTPATIENT)
Dept: INTERNAL MEDICINE CLINIC | Age: 76
End: 2021-04-13
Payer: MEDICARE

## 2021-04-13 VITALS
DIASTOLIC BLOOD PRESSURE: 90 MMHG | BODY MASS INDEX: 28.5 KG/M2 | WEIGHT: 203.6 LBS | OXYGEN SATURATION: 95 % | RESPIRATION RATE: 12 BRPM | HEART RATE: 87 BPM | SYSTOLIC BLOOD PRESSURE: 155 MMHG | HEIGHT: 71 IN

## 2021-04-13 DIAGNOSIS — G89.29 CHRONIC LOW BACK PAIN WITH SCIATICA, SCIATICA LATERALITY UNSPECIFIED, UNSPECIFIED BACK PAIN LATERALITY: ICD-10-CM

## 2021-04-13 DIAGNOSIS — G99.2 STENOSIS OF CERVICAL SPINE WITH MYELOPATHY (HCC): ICD-10-CM

## 2021-04-13 DIAGNOSIS — G89.4 CHRONIC PAIN SYNDROME: Primary | ICD-10-CM

## 2021-04-13 DIAGNOSIS — M48.02 STENOSIS OF CERVICAL SPINE WITH MYELOPATHY (HCC): ICD-10-CM

## 2021-04-13 DIAGNOSIS — F11.20 NARCOTIC DEPENDENCE (HCC): ICD-10-CM

## 2021-04-13 DIAGNOSIS — I10 ESSENTIAL HYPERTENSION: ICD-10-CM

## 2021-04-13 DIAGNOSIS — M54.40 CHRONIC LOW BACK PAIN WITH SCIATICA, SCIATICA LATERALITY UNSPECIFIED, UNSPECIFIED BACK PAIN LATERALITY: ICD-10-CM

## 2021-04-13 PROCEDURE — G8419 CALC BMI OUT NRM PARAM NOF/U: HCPCS | Performed by: INTERNAL MEDICINE

## 2021-04-13 PROCEDURE — 1101F PT FALLS ASSESS-DOCD LE1/YR: CPT | Performed by: INTERNAL MEDICINE

## 2021-04-13 PROCEDURE — G8753 SYS BP > OR = 140: HCPCS | Performed by: INTERNAL MEDICINE

## 2021-04-13 PROCEDURE — G0463 HOSPITAL OUTPT CLINIC VISIT: HCPCS | Performed by: INTERNAL MEDICINE

## 2021-04-13 PROCEDURE — 3017F COLORECTAL CA SCREEN DOC REV: CPT | Performed by: INTERNAL MEDICINE

## 2021-04-13 PROCEDURE — G8755 DIAS BP > OR = 90: HCPCS | Performed by: INTERNAL MEDICINE

## 2021-04-13 PROCEDURE — G8427 DOCREV CUR MEDS BY ELIG CLIN: HCPCS | Performed by: INTERNAL MEDICINE

## 2021-04-13 PROCEDURE — G8536 NO DOC ELDER MAL SCRN: HCPCS | Performed by: INTERNAL MEDICINE

## 2021-04-13 PROCEDURE — 99214 OFFICE O/P EST MOD 30 MIN: CPT | Performed by: INTERNAL MEDICINE

## 2021-04-13 PROCEDURE — G9717 DOC PT DX DEP/BP F/U NT REQ: HCPCS | Performed by: INTERNAL MEDICINE

## 2021-04-13 RX ORDER — OLMESARTAN MEDOXOMIL 40 MG/1
40 TABLET ORAL DAILY
Qty: 90 TAB | Refills: 1 | Status: SHIPPED | OUTPATIENT
Start: 2021-04-13 | End: 2021-09-15 | Stop reason: SDUPTHER

## 2021-04-13 RX ORDER — OXYCODONE HYDROCHLORIDE 30 MG/1
30 TABLET ORAL
Qty: 120 TAB | Refills: 0 | Status: SHIPPED | OUTPATIENT
Start: 2021-04-13 | End: 2021-05-11 | Stop reason: SDUPTHER

## 2021-04-13 RX ORDER — OXYCODONE HYDROCHLORIDE 80 MG/1
80 TABLET, FILM COATED, EXTENDED RELEASE ORAL EVERY 12 HOURS
Qty: 60 TAB | Refills: 0 | Status: SHIPPED | OUTPATIENT
Start: 2021-04-13 | End: 2021-05-11 | Stop reason: SDUPTHER

## 2021-04-13 RX ORDER — MORPHINE SULFATE 100 MG/1
100 TABLET, FILM COATED, EXTENDED RELEASE ORAL
COMMUNITY
Start: 2021-04-07 | End: 2021-04-13 | Stop reason: SINTOL

## 2021-04-13 RX ORDER — OXYCODONE HYDROCHLORIDE 30 MG/1
30 TABLET ORAL
COMMUNITY
Start: 2021-04-07 | End: 2021-09-08 | Stop reason: SDUPTHER
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
## 2021-04-14 ENCOUNTER — DOCUMENTATION ONLY (OUTPATIENT)
Dept: INTERNAL MEDICINE CLINIC | Age: 76
End: 2021-04-14

## 2021-04-14 NOTE — PROGRESS NOTES
Prior authorization for Oxysontin 80mg BID submitted via CoverMyMeds.  Pending approval Key: [facility]

## 2021-04-15 ENCOUNTER — TELEPHONE (OUTPATIENT)
Dept: INTERNAL MEDICINE CLINIC | Age: 76
End: 2021-04-15

## 2021-04-15 NOTE — TELEPHONE ENCOUNTER
----- Message from Ollie Huitron sent at 4/15/2021  8:54 AM EDT -----  Regarding: STEPHANIE/MD/TELEPHONE  Contact: [phone]  General Message/Vendor Calls    Caller's first and last name: Jimi Rodriguez      Reason for call: Clinical information needed      Callback required yes/no and why: Yes      Best contact number(s): [phone]      Details to clarify the request: Briana Smart from [facility] responding from a request from [address] [place] [address] My Meds for Oxycodone. Per [facility] clinical information is needed.     Ollie Huitron

## 2021-04-16 ENCOUNTER — DOCUMENTATION ONLY (OUTPATIENT)
Dept: INTERNAL MEDICINE CLINIC | Age: 76
End: 2021-04-16

## 2021-04-22 NOTE — TELEPHONE ENCOUNTER
Patient is following up on his Prior Auth for his pain medication ,   states he's been taking the 30 mg immediate release tab about 5-6 times a day due to amount of pain his leg causes.      requesting to speak with the nurse , he can be reached at [phone]

## 2021-04-22 NOTE — TELEPHONE ENCOUNTER
Returned call to pt. He is asking for the update on PA for Oxycodone 80mg. Pt advised that it was approved on 4/16/21.  He voices understanding and thanks

## 2021-05-11 DIAGNOSIS — F11.20 NARCOTIC DEPENDENCE (HCC): ICD-10-CM

## 2021-05-11 DIAGNOSIS — G89.4 CHRONIC PAIN SYNDROME: ICD-10-CM

## 2021-05-11 DIAGNOSIS — G89.29 CHRONIC LOW BACK PAIN WITH SCIATICA, SCIATICA LATERALITY UNSPECIFIED, UNSPECIFIED BACK PAIN LATERALITY: ICD-10-CM

## 2021-05-11 DIAGNOSIS — G99.2 STENOSIS OF CERVICAL SPINE WITH MYELOPATHY (HCC): ICD-10-CM

## 2021-05-11 DIAGNOSIS — M54.40 CHRONIC LOW BACK PAIN WITH SCIATICA, SCIATICA LATERALITY UNSPECIFIED, UNSPECIFIED BACK PAIN LATERALITY: ICD-10-CM

## 2021-05-11 DIAGNOSIS — M48.02 STENOSIS OF CERVICAL SPINE WITH MYELOPATHY (HCC): ICD-10-CM

## 2021-05-11 RX ORDER — OXYCODONE HYDROCHLORIDE 30 MG/1
30 TABLET ORAL
Qty: 120 TAB | Refills: 0 | Status: SHIPPED | OUTPATIENT
Start: 2021-05-12 | End: 2021-06-09 | Stop reason: SDUPTHER

## 2021-05-11 RX ORDER — OXYCODONE HYDROCHLORIDE 80 MG/1
80 TABLET, FILM COATED, EXTENDED RELEASE ORAL EVERY 12 HOURS
Qty: 60 TAB | Refills: 0 | Status: SHIPPED | OUTPATIENT
Start: 2021-05-21 | End: 2021-05-20 | Stop reason: SDUPTHER

## 2021-05-20 DIAGNOSIS — M48.02 STENOSIS OF CERVICAL SPINE WITH MYELOPATHY (HCC): ICD-10-CM

## 2021-05-20 DIAGNOSIS — M54.40 CHRONIC LOW BACK PAIN WITH SCIATICA, SCIATICA LATERALITY UNSPECIFIED, UNSPECIFIED BACK PAIN LATERALITY: ICD-10-CM

## 2021-05-20 DIAGNOSIS — G99.2 STENOSIS OF CERVICAL SPINE WITH MYELOPATHY (HCC): ICD-10-CM

## 2021-05-20 DIAGNOSIS — F11.20 NARCOTIC DEPENDENCE (HCC): ICD-10-CM

## 2021-05-20 DIAGNOSIS — G89.29 CHRONIC LOW BACK PAIN WITH SCIATICA, SCIATICA LATERALITY UNSPECIFIED, UNSPECIFIED BACK PAIN LATERALITY: ICD-10-CM

## 2021-05-20 DIAGNOSIS — G89.4 CHRONIC PAIN SYNDROME: ICD-10-CM

## 2021-05-20 NOTE — TELEPHONE ENCOUNTER
Deniz EVANS [facility] Phone Number:  [phone] (Call me) Medication Refill Caller (if not patient): N/A Relationship of caller (if not patient): N/A Best contact number(s):[phone] Name of medication and dosage if known: Oxycotin XR 80mg Is patient out of this medication (yes/no): No  
 
 
Pharmacy name: [facility] listed in chart? (yes/no): Yes Pharmacy phone number: [phone] Details to clarify the request: He will be out tomorrow Danny Patterson

## 2021-05-24 RX ORDER — OXYCODONE HYDROCHLORIDE 80 MG/1
80 TABLET, FILM COATED, EXTENDED RELEASE ORAL EVERY 12 HOURS
Qty: 60 TABLET | Refills: 0 | Status: SHIPPED | OUTPATIENT
Start: 2021-05-24 | End: 2021-06-17 | Stop reason: SDUPTHER

## 2021-06-09 DIAGNOSIS — M54.40 CHRONIC LOW BACK PAIN WITH SCIATICA, SCIATICA LATERALITY UNSPECIFIED, UNSPECIFIED BACK PAIN LATERALITY: ICD-10-CM

## 2021-06-09 DIAGNOSIS — G89.29 CHRONIC LOW BACK PAIN WITH SCIATICA, SCIATICA LATERALITY UNSPECIFIED, UNSPECIFIED BACK PAIN LATERALITY: ICD-10-CM

## 2021-06-09 DIAGNOSIS — M48.02 STENOSIS OF CERVICAL SPINE WITH MYELOPATHY (HCC): ICD-10-CM

## 2021-06-09 DIAGNOSIS — G89.4 CHRONIC PAIN SYNDROME: ICD-10-CM

## 2021-06-09 DIAGNOSIS — G99.2 STENOSIS OF CERVICAL SPINE WITH MYELOPATHY (HCC): ICD-10-CM

## 2021-06-09 RX ORDER — OXYCODONE HYDROCHLORIDE 30 MG/1
30 TABLET ORAL
Qty: 120 TABLET | Refills: 0 | Status: SHIPPED | OUTPATIENT
Start: 2021-06-09 | End: 2021-07-07 | Stop reason: SDUPTHER

## 2021-06-09 NOTE — TELEPHONE ENCOUNTER
Last appt: 4/13/2021  No future appointments. Requested Prescriptions     Pending Prescriptions Disp Refills    oxyCODONE IR (ROXICODONE) 30 mg immediate release tablet 120 Tablet 0     Sig: Take 1 Tablet by mouth every six (6) hours as needed for Pain for up to 30 days. Max Daily Amount: 120 mg.  For breakthrough pain

## 2021-06-09 NOTE — TELEPHONE ENCOUNTER
----- Message from [facility] sent at 6/9/2021 10:44 AM EDT -----  Regarding: Dr. Ly Close (if not patient): Pt      Relationship of caller (if not patient): Pt      Best contact number(s): [phone]      Name of medication and dosage if known: Oxycodone 30mg      Is patient out of this medication (yes/no):  No, will be out tomorrow      Pharmacy name: [facility] listed in chart? (yes/no): Yes  Pharmacy phone number: [phone]      Details to clarify the request: [facility]

## 2021-06-17 DIAGNOSIS — G89.4 CHRONIC PAIN SYNDROME: ICD-10-CM

## 2021-06-17 DIAGNOSIS — F11.20 NARCOTIC DEPENDENCE (HCC): ICD-10-CM

## 2021-06-17 DIAGNOSIS — M48.02 STENOSIS OF CERVICAL SPINE WITH MYELOPATHY (HCC): ICD-10-CM

## 2021-06-17 DIAGNOSIS — G99.2 STENOSIS OF CERVICAL SPINE WITH MYELOPATHY (HCC): ICD-10-CM

## 2021-06-17 DIAGNOSIS — G89.29 CHRONIC LOW BACK PAIN WITH SCIATICA, SCIATICA LATERALITY UNSPECIFIED, UNSPECIFIED BACK PAIN LATERALITY: ICD-10-CM

## 2021-06-17 DIAGNOSIS — M54.40 CHRONIC LOW BACK PAIN WITH SCIATICA, SCIATICA LATERALITY UNSPECIFIED, UNSPECIFIED BACK PAIN LATERALITY: ICD-10-CM

## 2021-06-18 RX ORDER — OXYCODONE HYDROCHLORIDE 80 MG/1
80 TABLET, FILM COATED, EXTENDED RELEASE ORAL EVERY 12 HOURS
Qty: 60 TABLET | Refills: 0 | Status: SHIPPED | OUTPATIENT
Start: 2021-06-20 | End: 2021-07-14 | Stop reason: SDUPTHER

## 2021-07-07 ENCOUNTER — TELEPHONE (OUTPATIENT)
Dept: INTERNAL MEDICINE CLINIC | Age: 76
End: 2021-07-07

## 2021-07-07 DIAGNOSIS — G89.4 CHRONIC PAIN SYNDROME: ICD-10-CM

## 2021-07-07 DIAGNOSIS — G89.29 CHRONIC LOW BACK PAIN WITH SCIATICA, SCIATICA LATERALITY UNSPECIFIED, UNSPECIFIED BACK PAIN LATERALITY: ICD-10-CM

## 2021-07-07 DIAGNOSIS — M54.40 CHRONIC LOW BACK PAIN WITH SCIATICA, SCIATICA LATERALITY UNSPECIFIED, UNSPECIFIED BACK PAIN LATERALITY: ICD-10-CM

## 2021-07-07 DIAGNOSIS — M48.02 STENOSIS OF CERVICAL SPINE WITH MYELOPATHY (HCC): ICD-10-CM

## 2021-07-07 DIAGNOSIS — G99.2 STENOSIS OF CERVICAL SPINE WITH MYELOPATHY (HCC): ICD-10-CM

## 2021-07-07 RX ORDER — OXYCODONE HYDROCHLORIDE 30 MG/1
30 TABLET ORAL
Qty: 120 TABLET | Refills: 0 | Status: SHIPPED | OUTPATIENT
Start: 2021-07-08 | End: 2021-07-14 | Stop reason: SDUPTHER

## 2021-07-07 NOTE — TELEPHONE ENCOUNTER
----- Message from Gerald Shea sent at 7/7/2021  9:48 AM EDT -----  Regarding: Dr. Rosa Elena Key / refill  Medication Refill    Caller (if not patient): pt      Relationship of caller (if not patient): Pt      Best contact number(s):[phone]      Name of medication and dosage if known: \"oxycodone 30 mg\"       Is patient out of this medication (yes/no):yes      Pharmacy name: [facility] [address] and [address] [place] [address] listed in chart? (yes/no):    Pharmacy phone number:      Details to clarify the request:      Gerald Shea

## 2021-07-14 ENCOUNTER — OFFICE VISIT (OUTPATIENT)
Dept: INTERNAL MEDICINE CLINIC | Age: 76
End: 2021-07-14
Payer: MEDICARE

## 2021-07-14 VITALS
HEIGHT: 71 IN | HEART RATE: 83 BPM | WEIGHT: 179 LBS | RESPIRATION RATE: 13 BRPM | TEMPERATURE: 98.6 F | BODY MASS INDEX: 25.06 KG/M2 | DIASTOLIC BLOOD PRESSURE: 84 MMHG | OXYGEN SATURATION: 97 % | SYSTOLIC BLOOD PRESSURE: 136 MMHG

## 2021-07-14 DIAGNOSIS — R39.11 URINARY HESITANCY: ICD-10-CM

## 2021-07-14 DIAGNOSIS — F41.8 SITUATIONAL ANXIETY: ICD-10-CM

## 2021-07-14 DIAGNOSIS — I10 ESSENTIAL HYPERTENSION: ICD-10-CM

## 2021-07-14 DIAGNOSIS — L30.8 PSORIASIFORM DERMATITIS: ICD-10-CM

## 2021-07-14 DIAGNOSIS — M48.02 STENOSIS OF CERVICAL SPINE WITH MYELOPATHY (HCC): ICD-10-CM

## 2021-07-14 DIAGNOSIS — G89.4 CHRONIC PAIN SYNDROME: ICD-10-CM

## 2021-07-14 DIAGNOSIS — E86.0 DEHYDRATION: Primary | ICD-10-CM

## 2021-07-14 DIAGNOSIS — G89.29 CHRONIC LOW BACK PAIN WITH SCIATICA, SCIATICA LATERALITY UNSPECIFIED, UNSPECIFIED BACK PAIN LATERALITY: ICD-10-CM

## 2021-07-14 DIAGNOSIS — F11.20 NARCOTIC DEPENDENCE (HCC): ICD-10-CM

## 2021-07-14 DIAGNOSIS — M54.40 CHRONIC LOW BACK PAIN WITH SCIATICA, SCIATICA LATERALITY UNSPECIFIED, UNSPECIFIED BACK PAIN LATERALITY: ICD-10-CM

## 2021-07-14 DIAGNOSIS — G99.2 STENOSIS OF CERVICAL SPINE WITH MYELOPATHY (HCC): ICD-10-CM

## 2021-07-14 DIAGNOSIS — H57.89 ITCH OF RIGHT EYE: ICD-10-CM

## 2021-07-14 LAB
ANION GAP SERPL CALC-SCNC: 9 MMOL/L (ref 5–15)
BUN SERPL-MCNC: 21 MG/DL (ref 6–20)
BUN/CREAT SERPL: 18 (ref 12–20)
CALCIUM SERPL-MCNC: 9.9 MG/DL (ref 8.5–10.1)
CHLORIDE SERPL-SCNC: 101 MMOL/L (ref 97–108)
CO2 SERPL-SCNC: 28 MMOL/L (ref 21–32)
CREAT SERPL-MCNC: 1.17 MG/DL (ref 0.7–1.3)
GLUCOSE SERPL-MCNC: 96 MG/DL (ref 65–100)
POTASSIUM SERPL-SCNC: 3.7 MMOL/L (ref 3.5–5.1)
SODIUM SERPL-SCNC: 138 MMOL/L (ref 136–145)

## 2021-07-14 PROCEDURE — G8754 DIAS BP LESS 90: HCPCS | Performed by: INTERNAL MEDICINE

## 2021-07-14 PROCEDURE — 3017F COLORECTAL CA SCREEN DOC REV: CPT | Performed by: INTERNAL MEDICINE

## 2021-07-14 PROCEDURE — G9717 DOC PT DX DEP/BP F/U NT REQ: HCPCS | Performed by: INTERNAL MEDICINE

## 2021-07-14 PROCEDURE — G8420 CALC BMI NORM PARAMETERS: HCPCS | Performed by: INTERNAL MEDICINE

## 2021-07-14 PROCEDURE — G8752 SYS BP LESS 140: HCPCS | Performed by: INTERNAL MEDICINE

## 2021-07-14 PROCEDURE — 1101F PT FALLS ASSESS-DOCD LE1/YR: CPT | Performed by: INTERNAL MEDICINE

## 2021-07-14 PROCEDURE — 99214 OFFICE O/P EST MOD 30 MIN: CPT | Performed by: INTERNAL MEDICINE

## 2021-07-14 PROCEDURE — G8427 DOCREV CUR MEDS BY ELIG CLIN: HCPCS | Performed by: INTERNAL MEDICINE

## 2021-07-14 PROCEDURE — G0463 HOSPITAL OUTPT CLINIC VISIT: HCPCS | Performed by: INTERNAL MEDICINE

## 2021-07-14 PROCEDURE — G8536 NO DOC ELDER MAL SCRN: HCPCS | Performed by: INTERNAL MEDICINE

## 2021-07-14 RX ORDER — CLOBETASOL PROPIONATE 0.5 MG/G
OINTMENT TOPICAL 2 TIMES DAILY
Qty: 60 G | Refills: 4
Start: 2021-07-14 | End: 2021-10-27 | Stop reason: ALTCHOICE

## 2021-07-14 RX ORDER — OXYCODONE HYDROCHLORIDE 80 MG/1
80 TABLET, FILM COATED, EXTENDED RELEASE ORAL EVERY 12 HOURS
Qty: 60 TABLET | Refills: 0 | Status: SHIPPED | OUTPATIENT
Start: 2021-07-19 | End: 2021-08-18

## 2021-07-14 RX ORDER — PERMETHRIN 50 MG/G
CREAM TOPICAL
Qty: 60 G | Refills: 3 | Status: SHIPPED | OUTPATIENT
Start: 2021-07-14 | End: 2021-12-27 | Stop reason: SDUPTHER

## 2021-07-14 RX ORDER — OXYCODONE HYDROCHLORIDE 30 MG/1
30 TABLET ORAL
Qty: 120 TABLET | Refills: 0 | Status: SHIPPED | OUTPATIENT
Start: 2021-08-07 | End: 2021-09-06

## 2021-07-14 RX ORDER — OXYCODONE HYDROCHLORIDE 80 MG/1
80 TABLET, FILM COATED, EXTENDED RELEASE ORAL EVERY 12 HOURS
Qty: 60 TABLET | Refills: 0 | Status: SHIPPED | OUTPATIENT
Start: 2021-08-18 | End: 2021-09-08 | Stop reason: SDUPTHER

## 2021-07-14 RX ORDER — OXYCODONE HYDROCHLORIDE 80 MG/1
80 TABLET, FILM COATED, EXTENDED RELEASE ORAL EVERY 12 HOURS
Qty: 60 TABLET | Refills: 0 | Status: SHIPPED | OUTPATIENT
Start: 2021-07-19 | End: 2021-07-14 | Stop reason: SDUPTHER

## 2021-07-14 NOTE — PROGRESS NOTES
HISTORY OF PRESENT ILLNESS    Chief Complaint   Patient presents with    Rash     Itchy and red    Abdominal Pain    Eye Problem       Presents for follow-up    He was admitted to [facility] on June 28 for altered mental status. He was visiting his daughter and was acting confused after he drove there on a hot day. Admits that he was not drinking any fluids while driving because his appetite has been poor and was found to be dehydrated. He was given IV fluids in ER. Eric Reed cell count was very mildly elevated and he was initially worked up for sepsis because his blood pressure was low. Work-up was negative with normal urinalysis, chest x-ray and blood cultures. Initially given antibiotics. He remained afebrile. Renal function was abnormal initially, consistent with dehydration, but improved to normal.  He denies any additional drug or medication use. He has been taking his medications, including his OxyContin and oxycodone for chronic pain as prescribed. Did not run out of medication at all. Chlorthalidone has been held due to dehydration. Today, he says he has been back in town. He is attempting to move back in with his wife. They have been estranged, but they have been communicating more lately. He says that he does not want to go and live with his daughter anymore because it is too stressful due to the behavior of one of his grandchildren. He is meeting with his wife today here in hopes that she will take him back in. He fears that she \"lives a different life now\" and wont let him live there. He has been depressed about this whole situation. He does not want to take any medications for depression but reports depressed mood, poor appetite. Chronic pain has been reasonably well controlled on current regimen of OxyContin 80 mg every 12 hours and oxycodone 30 mg every 6 hours as needed for breakthrough.  reviewed and no abnormalities noted.     Reports flareup of his psoriasis. Elbows are relatively stable, but he is having some increased lesions on his arms and legs at times. Has been using clobetasol cream per dermatology with some relief. He states that permethrin also helps at times and he asks for refill. 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 has a past surgical history that includes hx lumbar laminectomy (6413) and hx colonoscopy (2008). reports that he has never smoked. He has never used smokeless tobacco. He reports that he does not drink alcohol and does not use drugs. family history includes Cancer in his mother. Physical Exam   Nursing note and vitals reviewed. Blood pressure 136/84, pulse 83, temperature 98.6 °F (37 °C), temperature source Oral, resp. rate 13, height 5' 11\" (1.803 m), weight 179 lb (81.2 kg), SpO2 97 %. Constitutional:  No distress. Eyes: Conjunctivae are normal.   Ears:  Hearing grossly intact  Cardiovascular: Normal rate. regular rhythm, no murmurs or gallops  No edema  Pulmonary/Chest: Effort normal.   CTAB  Musculoskeletal: moves all 4 extremities   Neurological: Alert and oriented to person, place, and time. Skin: No visible rash noted. Psychiatric: Normal mood and affect. Behavior is normal.     ASSESSMENT and PLAN  Diagnoses and all orders for this visit:    1. Dehydration  Appears hydrated now. Secondary to poor self-care, driving in the heat, chlorthalidone exacerbated, depression exacerbated. -     METABOLIC PANEL, BASIC; Future    2.  Essential hypertension  Blood pressure is reasonably controlled in the office today. States he is not taking chlorthalidone. On multiple blood pressure medications and I do question compliance, making it difficult to really assess which medications are working. Continue current regimen  -     METABOLIC PANEL, BASIC; Future    3. Psoriasiform dermatitis  Appreciate input of dermatology. Clobetasol ointment seems to help the most.  I agreed to refill permethrin, but this does not appear to be a mite infection. Not sure if this has any evidence of support to improve psoriasis. Follow-up dermatology. -     clobetasoL (TEMOVATE) 0.05 % ointment; Apply  to affected area two (2) times a day. -     permethrin (ACTICIN) 5 % topical cream; apply twice daily as directed for 10 days. Can re-treat in 1 month as needed    4. Situational anxiety  Offered my support. Stressful time due to difficult relationship with his wife, and lack of a comfortable place for him to live at this time. Hopefully she will take them back in. He is fairly opposed to going back living with his daughter again, but it sounds like that is an option for him. Patient with PTSD and depression from service in [employer]. 5. Urinary hesitancy  He requests a PSA test.  Has some intermittent hesitancy. -     PSA W/ REFLX FREE PSA; Future    6. Itch of right eye  Has an itch of his right eye. He is going downstairs to make an appointment with ophthalmology. Mild. Appears normal on my exam.    7. Chronic pain syndrome  8. Stenosis of cervical spine with myelopathy ([address]  9. Chronic low back pain with sciatica, sciatica laterality unspecified, unspecified back pain laterality  10. Narcotic dependence ([address]  He is compliant with medication therapy.  reviewed and there is no suspicious activity. Since I will be on vacation, I have refilled his OxyContin for 2 months, July 19 and August 18. I sent both to pharmacy.   I have also sent in a refill of oxycodone 30 mg pills to be filled on August 7.  -     oxyCODONE ER (OxyCONTIN) 80 mg ER tablet; Take 1 Tablet by mouth every twelve (12) hours for 30 days. Max Daily Amount: 160 mg. For basal chronic pain. Fill 8/18/21  Indications: severe chronic pain with opioid tolerance  -     oxyCODONE ER (OxyCONTIN) 80 mg ER tablet; Take 1 Tablet by mouth every twelve (12) hours for 30 days. Max Daily Amount: 160 mg. For basal chronic pain. Fill 7/19/21  Indications: severe chronic pain with opioid tolerance    lab results and schedule of future lab studies reviewed with patient  reviewed medications and side effects in detail    Return to clinic for further evaluation if new symptoms develop        Current Outpatient Medications   Medication Sig    clobetasoL (TEMOVATE) 0.05 % ointment Apply  to affected area two (2) times a day.  permethrin (ACTICIN) 5 % topical cream apply twice daily as directed for 10 days. Can re-treat in 1 month as needed    [START ON 8/7/2021] oxyCODONE IR (ROXICODONE) 30 mg immediate release tablet Take 1 Tablet by mouth every six (6) hours as needed for Pain for up to 30 days. Max Daily Amount: 120 mg. For breakthrough pain    [START ON 8/18/2021] oxyCODONE ER (OxyCONTIN) 80 mg ER tablet Take 1 Tablet by mouth every twelve (12) hours for 30 days. Max Daily Amount: 160 mg. For basal chronic pain. Fill 8/18/21  Indications: severe chronic pain with opioid tolerance    oxyCODONE IR (ROXICODONE) 30 mg immediate release tablet Take 30 mg by mouth every six (6) hours as needed.  olmesartan (BENICAR) 40 mg tablet Take 1 Tab by mouth daily. Replaces lisinopril for blood pressure    clobetasoL (TEMOVATE) 0.05 % ointment APPLY EXTERNALLY TO THE AFFECTED AREA TWICE DAILY    cloNIDine HCL (CATAPRES) 0.2 mg tablet Take 1 Tab by mouth two (2) times a day.  Increased dose 11/21/19    colloidal oatmeal (Eczema Relief) 1 % crea Use as recommended by derm    naloxone (NARCAN) 4 mg/actuation nasal spray Use 1 spray intranasally, then discard. Repeat with new spray every 2 min as needed for opioid overdose symptoms, alternating nostrils. (Patient not taking: Reported on 7/14/2021)    famotidine (PEPCID) 20 mg tablet Take 1 Tab by mouth two (2) times a day. (Patient not taking: Reported on 7/14/2021)    promethazine (PHENERGAN) 25 mg tablet Take 1 Tab by mouth every eight (8) hours as needed for Nausea. (Patient not taking: Reported on 7/14/2021)     No current facility-administered medications for this visit.

## 2021-07-15 LAB
PSA SERPL-MCNC: 0.5 NG/ML (ref 0–4)
REFLEX CRITERIA: NORMAL

## 2021-08-06 ENCOUNTER — TELEPHONE (OUTPATIENT)
Dept: INTERNAL MEDICINE CLINIC | Age: 76
End: 2021-08-06

## 2021-08-06 NOTE — TELEPHONE ENCOUNTER
----- Message from Aby Cardoza sent at 8/6/2021 10:14 AM EDT -----  Regarding: /refill  Contact: [phone]  Medication Refill    Caller (if not patient): N/A      Relationship of caller (if not patient): N/A      Best contact number(s):[phone]      Name of medication and dosage if known:Oxycodone 30mg quantity 120      Is patient out of this medication (yes/no): Yes tomorrow      Pharmacy name: [address] [place] [address] listed in chart? (yes/no): Yes  Pharmacy phone number: [phone]      Details to clarify the request: N/A      Aby Cardoza

## 2021-09-08 DIAGNOSIS — M54.40 CHRONIC LOW BACK PAIN WITH SCIATICA, SCIATICA LATERALITY UNSPECIFIED, UNSPECIFIED BACK PAIN LATERALITY: ICD-10-CM

## 2021-09-08 DIAGNOSIS — G89.4 CHRONIC PAIN SYNDROME: ICD-10-CM

## 2021-09-08 DIAGNOSIS — G99.2 STENOSIS OF CERVICAL SPINE WITH MYELOPATHY (HCC): ICD-10-CM

## 2021-09-08 DIAGNOSIS — G89.29 CHRONIC LOW BACK PAIN WITH SCIATICA, SCIATICA LATERALITY UNSPECIFIED, UNSPECIFIED BACK PAIN LATERALITY: ICD-10-CM

## 2021-09-08 DIAGNOSIS — M48.02 STENOSIS OF CERVICAL SPINE WITH MYELOPATHY (HCC): ICD-10-CM

## 2021-09-08 DIAGNOSIS — F11.20 NARCOTIC DEPENDENCE (HCC): ICD-10-CM

## 2021-09-08 RX ORDER — OXYCODONE HYDROCHLORIDE 30 MG/1
30 TABLET ORAL
Qty: 120 TABLET | Refills: 0 | Status: SHIPPED | OUTPATIENT
Start: 2021-09-08 | End: 2021-09-15 | Stop reason: SDUPTHER

## 2021-09-08 RX ORDER — OXYCODONE HYDROCHLORIDE 80 MG/1
80 TABLET, FILM COATED, EXTENDED RELEASE ORAL EVERY 12 HOURS
Qty: 60 TABLET | Refills: 0 | Status: SHIPPED | OUTPATIENT
Start: 2021-09-16 | End: 2021-09-15 | Stop reason: DRUGHIGH

## 2021-09-15 ENCOUNTER — OFFICE VISIT (OUTPATIENT)
Dept: INTERNAL MEDICINE CLINIC | Age: 76
End: 2021-09-15
Payer: MEDICARE

## 2021-09-15 VITALS
HEIGHT: 71 IN | SYSTOLIC BLOOD PRESSURE: 165 MMHG | BODY MASS INDEX: 24.64 KG/M2 | DIASTOLIC BLOOD PRESSURE: 90 MMHG | OXYGEN SATURATION: 98 % | TEMPERATURE: 98.3 F | RESPIRATION RATE: 12 BRPM | HEART RATE: 80 BPM | WEIGHT: 176 LBS

## 2021-09-15 DIAGNOSIS — I10 ESSENTIAL HYPERTENSION: ICD-10-CM

## 2021-09-15 DIAGNOSIS — G89.29 CHRONIC LOW BACK PAIN WITH SCIATICA, SCIATICA LATERALITY UNSPECIFIED, UNSPECIFIED BACK PAIN LATERALITY: ICD-10-CM

## 2021-09-15 DIAGNOSIS — F41.8 SITUATIONAL ANXIETY: Primary | ICD-10-CM

## 2021-09-15 DIAGNOSIS — M54.40 CHRONIC LOW BACK PAIN WITH SCIATICA, SCIATICA LATERALITY UNSPECIFIED, UNSPECIFIED BACK PAIN LATERALITY: ICD-10-CM

## 2021-09-15 DIAGNOSIS — F11.20 NARCOTIC DEPENDENCE (HCC): ICD-10-CM

## 2021-09-15 DIAGNOSIS — G99.2 STENOSIS OF CERVICAL SPINE WITH MYELOPATHY (HCC): ICD-10-CM

## 2021-09-15 DIAGNOSIS — G89.4 CHRONIC PAIN SYNDROME: ICD-10-CM

## 2021-09-15 DIAGNOSIS — M48.02 STENOSIS OF CERVICAL SPINE WITH MYELOPATHY (HCC): ICD-10-CM

## 2021-09-15 DIAGNOSIS — L30.8 PSORIASIFORM DERMATITIS: ICD-10-CM

## 2021-09-15 PROCEDURE — G8754 DIAS BP LESS 90: HCPCS | Performed by: INTERNAL MEDICINE

## 2021-09-15 PROCEDURE — 1101F PT FALLS ASSESS-DOCD LE1/YR: CPT | Performed by: INTERNAL MEDICINE

## 2021-09-15 PROCEDURE — G8753 SYS BP > OR = 140: HCPCS | Performed by: INTERNAL MEDICINE

## 2021-09-15 PROCEDURE — 3017F COLORECTAL CA SCREEN DOC REV: CPT | Performed by: INTERNAL MEDICINE

## 2021-09-15 PROCEDURE — G8427 DOCREV CUR MEDS BY ELIG CLIN: HCPCS | Performed by: INTERNAL MEDICINE

## 2021-09-15 PROCEDURE — G8420 CALC BMI NORM PARAMETERS: HCPCS | Performed by: INTERNAL MEDICINE

## 2021-09-15 PROCEDURE — 99213 OFFICE O/P EST LOW 20 MIN: CPT | Performed by: INTERNAL MEDICINE

## 2021-09-15 PROCEDURE — G0463 HOSPITAL OUTPT CLINIC VISIT: HCPCS | Performed by: INTERNAL MEDICINE

## 2021-09-15 PROCEDURE — G8536 NO DOC ELDER MAL SCRN: HCPCS | Performed by: INTERNAL MEDICINE

## 2021-09-15 PROCEDURE — G9717 DOC PT DX DEP/BP F/U NT REQ: HCPCS | Performed by: INTERNAL MEDICINE

## 2021-09-15 RX ORDER — OXYCODONE HYDROCHLORIDE 80 MG/1
80 TABLET, FILM COATED, EXTENDED RELEASE ORAL EVERY 12 HOURS
Qty: 60 TABLET | Refills: 0 | Status: SHIPPED | OUTPATIENT
Start: 2021-10-15 | End: 2021-11-11 | Stop reason: SDUPTHER

## 2021-09-15 RX ORDER — OLMESARTAN MEDOXOMIL 40 MG/1
40 TABLET ORAL DAILY
Qty: 90 TABLET | Refills: 1 | Status: SHIPPED | OUTPATIENT
Start: 2021-09-15 | End: 2021-12-27 | Stop reason: SDUPTHER

## 2021-09-15 RX ORDER — OXYCODONE HYDROCHLORIDE 30 MG/1
30 TABLET ORAL
Qty: 120 TABLET | Refills: 0 | Status: SHIPPED | OUTPATIENT
Start: 2021-10-07 | End: 2021-10-27 | Stop reason: SDUPTHER
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
## 2021-10-27 ENCOUNTER — OFFICE VISIT (OUTPATIENT)
Dept: INTERNAL MEDICINE CLINIC | Age: 76
End: 2021-10-27
Payer: MEDICARE

## 2021-10-27 VITALS
RESPIRATION RATE: 13 BRPM | SYSTOLIC BLOOD PRESSURE: 190 MMHG | WEIGHT: 177 LBS | DIASTOLIC BLOOD PRESSURE: 104 MMHG | HEART RATE: 86 BPM | OXYGEN SATURATION: 98 % | HEIGHT: 71 IN | BODY MASS INDEX: 24.78 KG/M2 | TEMPERATURE: 98.4 F

## 2021-10-27 DIAGNOSIS — M54.40 CHRONIC LOW BACK PAIN WITH SCIATICA, SCIATICA LATERALITY UNSPECIFIED, UNSPECIFIED BACK PAIN LATERALITY: ICD-10-CM

## 2021-10-27 DIAGNOSIS — F41.8 SITUATIONAL ANXIETY: Primary | ICD-10-CM

## 2021-10-27 DIAGNOSIS — R39.11 URINARY HESITANCY: ICD-10-CM

## 2021-10-27 DIAGNOSIS — G89.29 CHRONIC LOW BACK PAIN WITH SCIATICA, SCIATICA LATERALITY UNSPECIFIED, UNSPECIFIED BACK PAIN LATERALITY: ICD-10-CM

## 2021-10-27 DIAGNOSIS — G99.2 STENOSIS OF CERVICAL SPINE WITH MYELOPATHY (HCC): ICD-10-CM

## 2021-10-27 DIAGNOSIS — G89.4 CHRONIC PAIN SYNDROME: ICD-10-CM

## 2021-10-27 DIAGNOSIS — R10.30 LOWER ABDOMINAL PAIN: ICD-10-CM

## 2021-10-27 DIAGNOSIS — M48.02 STENOSIS OF CERVICAL SPINE WITH MYELOPATHY (HCC): ICD-10-CM

## 2021-10-27 DIAGNOSIS — F11.20 NARCOTIC DEPENDENCE (HCC): ICD-10-CM

## 2021-10-27 LAB
BILIRUB UR QL STRIP: NEGATIVE
GLUCOSE UR-MCNC: NEGATIVE MG/DL
KETONES P FAST UR STRIP-MCNC: NEGATIVE MG/DL
PH UR STRIP: 6 [PH] (ref 4.6–8)
PROT UR QL STRIP: NEGATIVE
SP GR UR STRIP: 1.02 (ref 1–1.03)
UA UROBILINOGEN AMB POC: NORMAL (ref 0.2–1)
URINALYSIS CLARITY POC: CLEAR
URINALYSIS COLOR POC: YELLOW
URINE BLOOD POC: NEGATIVE
URINE LEUKOCYTES POC: NEGATIVE
URINE NITRITES POC: NEGATIVE

## 2021-10-27 PROCEDURE — G8755 DIAS BP > OR = 90: HCPCS | Performed by: INTERNAL MEDICINE

## 2021-10-27 PROCEDURE — 3017F COLORECTAL CA SCREEN DOC REV: CPT | Performed by: INTERNAL MEDICINE

## 2021-10-27 PROCEDURE — G8427 DOCREV CUR MEDS BY ELIG CLIN: HCPCS | Performed by: INTERNAL MEDICINE

## 2021-10-27 PROCEDURE — 81003 URINALYSIS AUTO W/O SCOPE: CPT | Performed by: INTERNAL MEDICINE

## 2021-10-27 PROCEDURE — 1101F PT FALLS ASSESS-DOCD LE1/YR: CPT | Performed by: INTERNAL MEDICINE

## 2021-10-27 PROCEDURE — G8753 SYS BP > OR = 140: HCPCS | Performed by: INTERNAL MEDICINE

## 2021-10-27 PROCEDURE — G8536 NO DOC ELDER MAL SCRN: HCPCS | Performed by: INTERNAL MEDICINE

## 2021-10-27 PROCEDURE — G9717 DOC PT DX DEP/BP F/U NT REQ: HCPCS | Performed by: INTERNAL MEDICINE

## 2021-10-27 PROCEDURE — G8420 CALC BMI NORM PARAMETERS: HCPCS | Performed by: INTERNAL MEDICINE

## 2021-10-27 PROCEDURE — 99214 OFFICE O/P EST MOD 30 MIN: CPT | Performed by: INTERNAL MEDICINE

## 2021-10-27 PROCEDURE — G0463 HOSPITAL OUTPT CLINIC VISIT: HCPCS | Performed by: INTERNAL MEDICINE

## 2021-10-27 RX ORDER — OXYCODONE HYDROCHLORIDE 30 MG/1
30 TABLET ORAL
Qty: 120 TABLET | Refills: 0 | Status: SHIPPED | OUTPATIENT
Start: 2021-10-27 | End: 2021-11-26 | Stop reason: SDUPTHER
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
## 2021-11-11 DIAGNOSIS — M54.40 CHRONIC LOW BACK PAIN WITH SCIATICA, SCIATICA LATERALITY UNSPECIFIED, UNSPECIFIED BACK PAIN LATERALITY: ICD-10-CM

## 2021-11-11 DIAGNOSIS — G89.4 CHRONIC PAIN SYNDROME: ICD-10-CM

## 2021-11-11 DIAGNOSIS — F11.20 NARCOTIC DEPENDENCE (HCC): ICD-10-CM

## 2021-11-11 DIAGNOSIS — M48.02 STENOSIS OF CERVICAL SPINE WITH MYELOPATHY (HCC): ICD-10-CM

## 2021-11-11 DIAGNOSIS — G89.29 CHRONIC LOW BACK PAIN WITH SCIATICA, SCIATICA LATERALITY UNSPECIFIED, UNSPECIFIED BACK PAIN LATERALITY: ICD-10-CM

## 2021-11-11 DIAGNOSIS — G99.2 STENOSIS OF CERVICAL SPINE WITH MYELOPATHY (HCC): ICD-10-CM

## 2021-11-11 RX ORDER — OXYCODONE HYDROCHLORIDE 80 MG/1
80 TABLET, FILM COATED, EXTENDED RELEASE ORAL EVERY 12 HOURS
Qty: 60 TABLET | Refills: 0 | Status: SHIPPED | OUTPATIENT
Start: 2021-11-11 | End: 2021-12-09 | Stop reason: SDUPTHER

## 2021-11-26 DIAGNOSIS — G89.4 CHRONIC PAIN SYNDROME: ICD-10-CM

## 2021-11-26 DIAGNOSIS — M54.40 CHRONIC LOW BACK PAIN WITH SCIATICA, SCIATICA LATERALITY UNSPECIFIED, UNSPECIFIED BACK PAIN LATERALITY: ICD-10-CM

## 2021-11-26 DIAGNOSIS — F11.20 NARCOTIC DEPENDENCE (HCC): ICD-10-CM

## 2021-11-26 DIAGNOSIS — M48.02 STENOSIS OF CERVICAL SPINE WITH MYELOPATHY (HCC): ICD-10-CM

## 2021-11-26 DIAGNOSIS — G89.29 CHRONIC LOW BACK PAIN WITH SCIATICA, SCIATICA LATERALITY UNSPECIFIED, UNSPECIFIED BACK PAIN LATERALITY: ICD-10-CM

## 2021-11-26 DIAGNOSIS — G99.2 STENOSIS OF CERVICAL SPINE WITH MYELOPATHY (HCC): ICD-10-CM

## 2021-11-26 NOTE — TELEPHONE ENCOUNTER
Requested Prescriptions     Pending Prescriptions Disp Refills    oxyCODONE IR (ROXICODONE) 30 mg immediate release tablet 120 Tablet 0     Sig: Take 1 Tablet by mouth every four (4) hours as needed for Pain for up to 30 days. Max Daily Amount: 180 mg.  Fill 10/27/21 (early due to acute pain)       Verified pharmacy

## 2021-11-28 RX ORDER — OXYCODONE HYDROCHLORIDE 30 MG/1
30 TABLET ORAL EVERY 6 HOURS
Qty: 120 TABLET | Refills: 0 | Status: SHIPPED | OUTPATIENT
Start: 2021-11-28 | End: 2021-12-27 | Stop reason: CLARIF

## 2021-12-09 DIAGNOSIS — M54.40 CHRONIC LOW BACK PAIN WITH SCIATICA, SCIATICA LATERALITY UNSPECIFIED, UNSPECIFIED BACK PAIN LATERALITY: ICD-10-CM

## 2021-12-09 DIAGNOSIS — M48.02 STENOSIS OF CERVICAL SPINE WITH MYELOPATHY (HCC): ICD-10-CM

## 2021-12-09 DIAGNOSIS — G89.29 CHRONIC LOW BACK PAIN WITH SCIATICA, SCIATICA LATERALITY UNSPECIFIED, UNSPECIFIED BACK PAIN LATERALITY: ICD-10-CM

## 2021-12-09 DIAGNOSIS — G99.2 STENOSIS OF CERVICAL SPINE WITH MYELOPATHY (HCC): ICD-10-CM

## 2021-12-09 DIAGNOSIS — G89.4 CHRONIC PAIN SYNDROME: ICD-10-CM

## 2021-12-09 DIAGNOSIS — F11.20 NARCOTIC DEPENDENCE (HCC): ICD-10-CM

## 2021-12-09 RX ORDER — OXYCODONE HYDROCHLORIDE 80 MG/1
80 TABLET, FILM COATED, EXTENDED RELEASE ORAL EVERY 12 HOURS
Qty: 60 TABLET | Refills: 0 | Status: SHIPPED | OUTPATIENT
Start: 2021-12-15 | End: 2021-12-10 | Stop reason: SDUPTHER

## 2021-12-13 DIAGNOSIS — G89.4 CHRONIC PAIN SYNDROME: ICD-10-CM

## 2021-12-13 DIAGNOSIS — G99.2 STENOSIS OF CERVICAL SPINE WITH MYELOPATHY (HCC): ICD-10-CM

## 2021-12-13 DIAGNOSIS — M54.40 CHRONIC LOW BACK PAIN WITH SCIATICA, SCIATICA LATERALITY UNSPECIFIED, UNSPECIFIED BACK PAIN LATERALITY: ICD-10-CM

## 2021-12-13 DIAGNOSIS — F11.20 NARCOTIC DEPENDENCE (HCC): ICD-10-CM

## 2021-12-13 DIAGNOSIS — G89.29 CHRONIC LOW BACK PAIN WITH SCIATICA, SCIATICA LATERALITY UNSPECIFIED, UNSPECIFIED BACK PAIN LATERALITY: ICD-10-CM

## 2021-12-13 DIAGNOSIS — M48.02 STENOSIS OF CERVICAL SPINE WITH MYELOPATHY (HCC): ICD-10-CM

## 2021-12-13 RX ORDER — OXYCODONE HYDROCHLORIDE 80 MG/1
80 TABLET, FILM COATED, EXTENDED RELEASE ORAL EVERY 12 HOURS
Qty: 60 TABLET | Refills: 0 | OUTPATIENT
Start: 2021-12-13 | End: 2022-01-12

## 2021-12-27 ENCOUNTER — OFFICE VISIT (OUTPATIENT)
Dept: INTERNAL MEDICINE CLINIC | Age: 76
End: 2021-12-27
Payer: MEDICARE

## 2021-12-27 VITALS
TEMPERATURE: 98.9 F | BODY MASS INDEX: 24.47 KG/M2 | WEIGHT: 174.8 LBS | DIASTOLIC BLOOD PRESSURE: 100 MMHG | SYSTOLIC BLOOD PRESSURE: 191 MMHG | RESPIRATION RATE: 14 BRPM | HEART RATE: 98 BPM | HEIGHT: 71 IN | OXYGEN SATURATION: 97 %

## 2021-12-27 DIAGNOSIS — M48.02 STENOSIS OF CERVICAL SPINE WITH MYELOPATHY (HCC): ICD-10-CM

## 2021-12-27 DIAGNOSIS — G99.2 STENOSIS OF CERVICAL SPINE WITH MYELOPATHY (HCC): ICD-10-CM

## 2021-12-27 DIAGNOSIS — G89.4 CHRONIC PAIN SYNDROME: ICD-10-CM

## 2021-12-27 DIAGNOSIS — M54.40 CHRONIC LOW BACK PAIN WITH SCIATICA, SCIATICA LATERALITY UNSPECIFIED, UNSPECIFIED BACK PAIN LATERALITY: ICD-10-CM

## 2021-12-27 DIAGNOSIS — G89.29 CHRONIC LOW BACK PAIN WITH SCIATICA, SCIATICA LATERALITY UNSPECIFIED, UNSPECIFIED BACK PAIN LATERALITY: ICD-10-CM

## 2021-12-27 DIAGNOSIS — K42.9 UMBILICAL HERNIA WITHOUT OBSTRUCTION AND WITHOUT GANGRENE: ICD-10-CM

## 2021-12-27 DIAGNOSIS — L30.8 PSORIASIFORM DERMATITIS: ICD-10-CM

## 2021-12-27 DIAGNOSIS — I10 ESSENTIAL HYPERTENSION: ICD-10-CM

## 2021-12-27 DIAGNOSIS — L29.8 CHRONIC PRURITIC RASH IN ADULT: Primary | ICD-10-CM

## 2021-12-27 DIAGNOSIS — F11.20 NARCOTIC DEPENDENCE (HCC): ICD-10-CM

## 2021-12-27 PROCEDURE — 3017F COLORECTAL CA SCREEN DOC REV: CPT | Performed by: INTERNAL MEDICINE

## 2021-12-27 PROCEDURE — 1101F PT FALLS ASSESS-DOCD LE1/YR: CPT | Performed by: INTERNAL MEDICINE

## 2021-12-27 PROCEDURE — G8420 CALC BMI NORM PARAMETERS: HCPCS | Performed by: INTERNAL MEDICINE

## 2021-12-27 PROCEDURE — G8427 DOCREV CUR MEDS BY ELIG CLIN: HCPCS | Performed by: INTERNAL MEDICINE

## 2021-12-27 PROCEDURE — 99214 OFFICE O/P EST MOD 30 MIN: CPT | Performed by: INTERNAL MEDICINE

## 2021-12-27 PROCEDURE — G8755 DIAS BP > OR = 90: HCPCS | Performed by: INTERNAL MEDICINE

## 2021-12-27 PROCEDURE — G8536 NO DOC ELDER MAL SCRN: HCPCS | Performed by: INTERNAL MEDICINE

## 2021-12-27 PROCEDURE — G8753 SYS BP > OR = 140: HCPCS | Performed by: INTERNAL MEDICINE

## 2021-12-27 PROCEDURE — G0463 HOSPITAL OUTPT CLINIC VISIT: HCPCS | Performed by: INTERNAL MEDICINE

## 2021-12-27 PROCEDURE — G9717 DOC PT DX DEP/BP F/U NT REQ: HCPCS | Performed by: INTERNAL MEDICINE

## 2021-12-27 RX ORDER — OXYCODONE HYDROCHLORIDE 80 MG/1
80 TABLET, FILM COATED, EXTENDED RELEASE ORAL EVERY 12 HOURS
Qty: 60 TABLET | Refills: 0 | Status: SHIPPED | OUTPATIENT
Start: 2022-01-12 | End: 2022-02-09 | Stop reason: SDUPTHER

## 2021-12-27 RX ORDER — PERMETHRIN 50 MG/G
CREAM TOPICAL
Qty: 60 G | Refills: 1 | Status: SHIPPED | OUTPATIENT
Start: 2021-12-27 | End: 2022-04-22 | Stop reason: ALTCHOICE

## 2021-12-27 RX ORDER — PERMETHRIN 50 MG/G
CREAM TOPICAL
Qty: 60 G | Refills: 1 | Status: SHIPPED | OUTPATIENT
Start: 2021-12-27 | End: 2021-12-27 | Stop reason: SDUPTHER

## 2021-12-27 RX ORDER — OLMESARTAN MEDOXOMIL 40 MG/1
40 TABLET ORAL DAILY
Qty: 90 TABLET | Refills: 1 | Status: SHIPPED | OUTPATIENT
Start: 2021-12-27 | End: 2022-04-22 | Stop reason: SDUPTHER

## 2021-12-27 RX ORDER — TRIAMCINOLONE ACETONIDE 1 MG/G
CREAM TOPICAL 2 TIMES DAILY
Qty: 45 G | Refills: 3 | Status: SHIPPED | OUTPATIENT
Start: 2021-12-27 | End: 2022-06-25

## 2021-12-27 RX ORDER — OXYCODONE HYDROCHLORIDE 30 MG/1
30 TABLET ORAL EVERY 6 HOURS
Qty: 120 TABLET | Refills: 0 | Status: SHIPPED | OUTPATIENT
Start: 2021-12-27 | End: 2022-01-24 | Stop reason: SDUPTHER

## 2021-12-27 RX ORDER — TRIAMCINOLONE ACETONIDE 1 MG/G
CREAM TOPICAL 2 TIMES DAILY
Qty: 45 G | Refills: 3 | Status: SHIPPED | OUTPATIENT
Start: 2021-12-27 | End: 2021-12-27 | Stop reason: SDUPTHER

## 2021-12-27 RX ORDER — DILTIAZEM HYDROCHLORIDE 120 MG/1
120 CAPSULE, COATED, EXTENDED RELEASE ORAL DAILY
Qty: 30 CAPSULE | Refills: 3 | Status: SHIPPED | OUTPATIENT
Start: 2021-12-27 | End: 2022-04-22

## 2021-12-27 RX ORDER — PERMETHRIN 50 MG/G
CREAM TOPICAL
Qty: 60 G | Refills: 1
Start: 2021-12-27 | End: 2021-12-27 | Stop reason: SDUPTHER

## 2021-12-27 NOTE — PROGRESS NOTES
HISTORY OF PRESENT ILLNESS    Chief Complaint   Patient presents with    Abdominal Pain     Mid area  between navel and left side.  Skin Problem     Spots on both hands - insurance did not cover cream.    Medication Evaluation       Presents for follow-up    Presents today with concerns about ongoing rash. He has been diagnosed with psoriasiform dermatitis and psoriasis based on dermatology evaluation. He says he is having a flare of a rash of his hands and forearms and would like both triamcinolone and permethrin. He feels that they won't help when they are used together, but he is unclear which one really helps the most.  Rash is very itchy. Denies any significant rash elsewhere. Today, he states drove here from [place] where he is living with his daughter, Madisyn Butcher. He tells me that the restraining order that his wife has taken against him has been lifted by a . But he tells me that he is planning to return back to [place] today or tomorrow. He has not clarify exactly what his relationship is with his wife who lives here. At any rate, he did not disclose any any plans to see her while he is here. He says it is all a \"misunderstanding. \"  He is pleasant in the office but appears a little bit unkept with soiled clothes. Hypertension. Currently only have olmesartan listed with no other blood pressure medication. Previously was taking clonidine chlorthalidone diltiazem but was hospitalized for syncope dehydration in the past.  Does not check any home blood pressures. Denies chest pain, edema, headache, shortness of breath. Reports some periumbilical pain. He has a protuberance there which has been independently and intermittently enlarging at times. Says his bowels are moving relatively well.     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 has a past surgical history that includes hx lumbar laminectomy (3415) and hx colonoscopy (2008). reports that he has never smoked. He has never used smokeless tobacco. He reports that he does not drink alcohol and does not use drugs. family history includes Cancer in his mother. Physical Exam   Nursing note and vitals reviewed. Blood pressure (!) 191/100, pulse 98, temperature 98.9 °F (37.2 °C), temperature source Oral, resp. rate 14, height 5' 11\" (1.803 m), weight 174 lb 12.8 oz (79.3 kg), SpO2 97 %. Constitutional:  No distress. His clothes are appropriate but soiled  Eyes: Conjunctivae are normal.   Ears:  Hearing grossly intact  Cardiovascular: Normal rate. regular rhythm, no murmurs or gallops  No edema  Pulmonary/Chest: Effort normal.   CTAB  Abdomen with moderate umbilical hernia which is mildly tense but reducible. NABS in that region. No skin changes. Musculoskeletal: moves all 4 extremities   Neurological: Alert and oriented to person, place, and time. Skin: No visible rash noted. Bilateral forearms with scaly papular rash. Psychiatric: Normal mood and affect. Behavior is normal.     Assessment and Plan    Diagnoses and all orders for this visit:    1. Chronic pruritic rash in adult  2. Psoriasiform dermatitis  Significant rash which seems to be psoriasiform dermatitis. Will refill Kenalog. Unclear if permethrin really is indicated but he feels that it helps. Do recommend follow-up with dermatology and may need to establish with dermatology up in [place].   -     triamcinolone acetonide (KENALOG) 0.1 % topical cream; Apply  to affected area two (2) times a day for 180 days.  -     permethrin (ACTICIN) 5 % topical cream; apply twice daily as directed for 10 days. For mite infection of skin/scabies    3. Umbilical hernia without obstruction and without gangrene  Reproducible umbilical hernia. I do think this will eventually need surgical repair. Given name of a surgeon locally but he might need to consider doing this in [place]. I am concerned that he will not recognize when this becomes more urgent.  -     REFERRAL TO GENERAL SURGERY    4. Essential hypertension  Markedly elevated hypertension. He does not monitor blood pressure at home and I am concerned about his competency to do so. This is a life-threatening issue long-term he needs better control, but needs better monitoring to see what medicines he needs to be on. He has been dehydrated and syncopal went over can medicated in the past.  Will make sure he is taking olmesartan and resume diltiazem. Try to avoid diuretics. To consider resuming clonidine.  -     olmesartan (BENICAR) 40 mg tablet; Take 1 Tablet by mouth daily. Replaces lisinopril for blood pressure  -     dilTIAZem ER (CARDIZEM CD) 120 mg capsule; Take 1 Capsule by mouth daily. 5. Chronic pain syndrome   6. Stenosis of cervical spine with myelopathy ([address]  7. Chronic low back pain with sciatica, sciatica laterality unspecified, unspecified back pain laterality  8. Narcotic dependence ([address]  This condition is controlled on current medication regimen as written in medication list.  Medications refilled. -     oxyCODONE ER (OxyCONTIN) 80 mg ER tablet; Take 1 Tablet by mouth every twelve (12) hours for 30 days. Max Daily Amount: 160 mg. For basal chronic pain  Indications: severe chronic pain with opioid tolerance  -     oxyCODONE IR (ROXICODONE) 30 mg immediate release tablet; Take 1 Tablet by mouth every six (6) hours for 30 days.  Max Daily Amount: 120 mg.        lab results and schedule of future lab studies reviewed with patient  reviewed medications and side effects in detail    Return to clinic for further evaluation if new symptoms develop        Current Outpatient Medications   Medication Sig    triamcinolone acetonide (KENALOG) 0.1 % topical cream Apply  to affected area two (2) times a day for 180 days.  permethrin (ACTICIN) 5 % topical cream apply twice daily as directed for 10 days. For mite infection of skin/scabies    [START ON 1/12/2022] oxyCODONE ER (OxyCONTIN) 80 mg ER tablet Take 1 Tablet by mouth every twelve (12) hours for 30 days. Max Daily Amount: 160 mg. For basal chronic pain  Indications: severe chronic pain with opioid tolerance    oxyCODONE IR (ROXICODONE) 30 mg immediate release tablet Take 1 Tablet by mouth every six (6) hours for 30 days. Max Daily Amount: 120 mg.    olmesartan (BENICAR) 40 mg tablet Take 1 Tablet by mouth daily. Replaces lisinopril for blood pressure    dilTIAZem ER (CARDIZEM CD) 120 mg capsule Take 1 Capsule by mouth daily.  colloidal oatmeal (Eczema Relief) 1 % crea Use as recommended by derm     No current facility-administered medications for this visit.

## 2022-01-24 DIAGNOSIS — G89.29 CHRONIC LOW BACK PAIN WITH SCIATICA, SCIATICA LATERALITY UNSPECIFIED, UNSPECIFIED BACK PAIN LATERALITY: ICD-10-CM

## 2022-01-24 DIAGNOSIS — M48.02 STENOSIS OF CERVICAL SPINE WITH MYELOPATHY (HCC): ICD-10-CM

## 2022-01-24 DIAGNOSIS — G89.4 CHRONIC PAIN SYNDROME: ICD-10-CM

## 2022-01-24 DIAGNOSIS — M54.40 CHRONIC LOW BACK PAIN WITH SCIATICA, SCIATICA LATERALITY UNSPECIFIED, UNSPECIFIED BACK PAIN LATERALITY: ICD-10-CM

## 2022-01-24 DIAGNOSIS — F11.20 NARCOTIC DEPENDENCE (HCC): ICD-10-CM

## 2022-01-24 DIAGNOSIS — G99.2 STENOSIS OF CERVICAL SPINE WITH MYELOPATHY (HCC): ICD-10-CM

## 2022-01-25 RX ORDER — OXYCODONE HYDROCHLORIDE 30 MG/1
30 TABLET ORAL EVERY 6 HOURS
Qty: 120 TABLET | Refills: 0 | Status: SHIPPED | OUTPATIENT
Start: 2022-01-26 | End: 2022-02-09 | Stop reason: SDUPTHER

## 2022-02-09 DIAGNOSIS — G89.29 CHRONIC LOW BACK PAIN WITH SCIATICA, SCIATICA LATERALITY UNSPECIFIED, UNSPECIFIED BACK PAIN LATERALITY: ICD-10-CM

## 2022-02-09 DIAGNOSIS — G89.4 CHRONIC PAIN SYNDROME: ICD-10-CM

## 2022-02-09 DIAGNOSIS — M48.02 STENOSIS OF CERVICAL SPINE WITH MYELOPATHY (HCC): ICD-10-CM

## 2022-02-09 DIAGNOSIS — F11.20 NARCOTIC DEPENDENCE (HCC): ICD-10-CM

## 2022-02-09 DIAGNOSIS — G99.2 STENOSIS OF CERVICAL SPINE WITH MYELOPATHY (HCC): ICD-10-CM

## 2022-02-09 DIAGNOSIS — M54.40 CHRONIC LOW BACK PAIN WITH SCIATICA, SCIATICA LATERALITY UNSPECIFIED, UNSPECIFIED BACK PAIN LATERALITY: ICD-10-CM

## 2022-02-09 RX ORDER — OXYCODONE HYDROCHLORIDE 80 MG/1
80 TABLET, FILM COATED, EXTENDED RELEASE ORAL EVERY 12 HOURS
Qty: 60 TABLET | Refills: 0 | Status: SHIPPED | OUTPATIENT
Start: 2022-02-11 | End: 2022-03-13

## 2022-02-09 RX ORDER — OXYCODONE HYDROCHLORIDE 30 MG/1
30 TABLET ORAL EVERY 6 HOURS
Qty: 120 TABLET | Refills: 0 | Status: SHIPPED | OUTPATIENT
Start: 2022-02-25 | End: 2022-02-23 | Stop reason: ALTCHOICE

## 2022-02-09 RX ORDER — OXYCODONE HYDROCHLORIDE 80 MG/1
80 TABLET, FILM COATED, EXTENDED RELEASE ORAL EVERY 12 HOURS
Qty: 60 TABLET | Refills: 0 | Status: SHIPPED | OUTPATIENT
Start: 2022-03-13 | End: 2022-04-07 | Stop reason: SDUPTHER

## 2022-02-09 NOTE — TELEPHONE ENCOUNTER
Please inform patient that I have refilled his medication for this month and for March since I will be away that week.

## 2022-02-10 ENCOUNTER — TELEPHONE (OUTPATIENT)
Dept: INTERNAL MEDICINE CLINIC | Age: 77
End: 2022-02-10

## 2022-02-10 NOTE — TELEPHONE ENCOUNTER
T/C to patient to update that his medication refill has been sent for both this month and March due to Dr. José Miguel Doran will be on vacation. No answer and LVM.

## 2022-02-11 ENCOUNTER — DOCUMENTATION ONLY (OUTPATIENT)
Dept: INTERNAL MEDICINE CLINIC | Age: 77
End: 2022-02-11

## 2022-02-11 ENCOUNTER — TELEPHONE (OUTPATIENT)
Dept: INTERNAL MEDICINE CLINIC | Age: 77
End: 2022-02-11

## 2022-02-11 NOTE — TELEPHONE ENCOUNTER
Spoke with patient and updated that authorization has been obtained for his Oxycodone ER 80 Mg good thru 12/31/22 and his pharmacy was notified. Patient grateful for the call.

## 2022-02-11 NOTE — PROGRESS NOTES
Prior authorization request approval  Oxycodone HCL ER 80mg tablet authorization is good until 12/31/2022

## 2022-02-11 NOTE — TELEPHONE ENCOUNTER
Patient is following up on his medication for Oxycodone 80 mg 60 tabs .  States a PA is required, he eeds to get his medication today per patient    He can be reached at  [phone]

## 2022-02-23 DIAGNOSIS — M48.02 STENOSIS OF CERVICAL SPINE WITH MYELOPATHY (HCC): ICD-10-CM

## 2022-02-23 DIAGNOSIS — G89.4 CHRONIC PAIN SYNDROME: ICD-10-CM

## 2022-02-23 DIAGNOSIS — G99.2 STENOSIS OF CERVICAL SPINE WITH MYELOPATHY (HCC): ICD-10-CM

## 2022-02-23 DIAGNOSIS — M54.40 CHRONIC LOW BACK PAIN WITH SCIATICA, SCIATICA LATERALITY UNSPECIFIED, UNSPECIFIED BACK PAIN LATERALITY: ICD-10-CM

## 2022-02-23 DIAGNOSIS — F11.20 NARCOTIC DEPENDENCE (HCC): ICD-10-CM

## 2022-02-23 DIAGNOSIS — G89.29 CHRONIC LOW BACK PAIN WITH SCIATICA, SCIATICA LATERALITY UNSPECIFIED, UNSPECIFIED BACK PAIN LATERALITY: ICD-10-CM

## 2022-02-23 RX ORDER — OXYCODONE HYDROCHLORIDE 30 MG/1
30 TABLET ORAL EVERY 6 HOURS
Qty: 120 TABLET | Refills: 0 | Status: SHIPPED | OUTPATIENT
Start: 2022-02-25 | End: 2022-03-25 | Stop reason: SDUPTHER

## 2022-03-10 DIAGNOSIS — F11.20 NARCOTIC DEPENDENCE (HCC): ICD-10-CM

## 2022-03-10 DIAGNOSIS — G99.2 STENOSIS OF CERVICAL SPINE WITH MYELOPATHY (HCC): ICD-10-CM

## 2022-03-10 DIAGNOSIS — M54.40 CHRONIC LOW BACK PAIN WITH SCIATICA, SCIATICA LATERALITY UNSPECIFIED, UNSPECIFIED BACK PAIN LATERALITY: ICD-10-CM

## 2022-03-10 DIAGNOSIS — G89.4 CHRONIC PAIN SYNDROME: ICD-10-CM

## 2022-03-10 DIAGNOSIS — G89.29 CHRONIC LOW BACK PAIN WITH SCIATICA, SCIATICA LATERALITY UNSPECIFIED, UNSPECIFIED BACK PAIN LATERALITY: ICD-10-CM

## 2022-03-10 DIAGNOSIS — M48.02 STENOSIS OF CERVICAL SPINE WITH MYELOPATHY (HCC): ICD-10-CM

## 2022-03-10 RX ORDER — OXYCODONE HYDROCHLORIDE 80 MG/1
80 TABLET, FILM COATED, EXTENDED RELEASE ORAL EVERY 12 HOURS
Qty: 60 TABLET | Refills: 0 | Status: CANCELLED | OUTPATIENT
Start: 2022-03-13 | End: 2022-04-12

## 2022-03-10 NOTE — TELEPHONE ENCOUNTER
T/C to patient to update that his script for his Oxycodone IR  80 mg is already at his pharmacy with a start date of 3/13/22. No answer and LVM.

## 2022-03-18 PROBLEM — L30.8 PSORIASIFORM DERMATITIS: Status: ACTIVE | Noted: 2018-03-23

## 2022-03-19 PROBLEM — Z71.89 ADVANCED CARE PLANNING/COUNSELING DISCUSSION: Status: ACTIVE | Noted: 2017-08-03

## 2022-03-25 DIAGNOSIS — F11.20 NARCOTIC DEPENDENCE (HCC): ICD-10-CM

## 2022-03-25 DIAGNOSIS — M48.02 STENOSIS OF CERVICAL SPINE WITH MYELOPATHY (HCC): ICD-10-CM

## 2022-03-25 DIAGNOSIS — G89.4 CHRONIC PAIN SYNDROME: ICD-10-CM

## 2022-03-25 DIAGNOSIS — M54.40 CHRONIC LOW BACK PAIN WITH SCIATICA, SCIATICA LATERALITY UNSPECIFIED, UNSPECIFIED BACK PAIN LATERALITY: ICD-10-CM

## 2022-03-25 DIAGNOSIS — G89.29 CHRONIC LOW BACK PAIN WITH SCIATICA, SCIATICA LATERALITY UNSPECIFIED, UNSPECIFIED BACK PAIN LATERALITY: ICD-10-CM

## 2022-03-25 DIAGNOSIS — G99.2 STENOSIS OF CERVICAL SPINE WITH MYELOPATHY (HCC): ICD-10-CM

## 2022-03-25 RX ORDER — OXYCODONE HYDROCHLORIDE 30 MG/1
30 TABLET ORAL EVERY 6 HOURS
Qty: 120 TABLET | Refills: 0 | Status: SHIPPED | OUTPATIENT
Start: 2022-03-26 | End: 2022-04-22 | Stop reason: SDUPTHER

## 2022-03-25 NOTE — TELEPHONE ENCOUNTER
Delta Patterson   1945    Dr Edwin Borges Patient    Refill on Oxycodone Pain Medication 30 mg. He is currently out of meds and is wanting to pick them up tomorrow.      If you need to call the patient, please do so at callback # [phone]

## 2022-03-30 ENCOUNTER — NURSE TRIAGE (OUTPATIENT)
Dept: OTHER | Facility: CLINIC | Age: 77
End: 2022-03-30

## 2022-03-30 NOTE — TELEPHONE ENCOUNTER
Received call from Nola Herndon at [facility] with Red Flag Complaint. Subjective: Caller states \"I have been having issues with the L foot. It is very painful to the point I can hardly bear weight. There is swelling in the foot. There is discoloration as well. the big toe is black and the others are light brown. \"    Current Symptoms: foot pain, swelling and discoloration (dark brown in some areas but the big toe is black and numb)    Onset: 10 days ago    Associated Symptoms: difficulty bearing weight    Pain Severity: 10/10    Temperature: denies fever    What has been tried: tylenol, motrin, aleve, pain medication    LMP: NA Pregnant: NA    Recommended disposition: Go to ED Now    Care advice provided, patient verbalizes understanding; denies any other questions or concerns; instructed to call back for any new or worsening symptoms. Patient/caller agrees to proceed to nearest Emergency Department    Attention Provider: Thank you for allowing me to participate in the care of your patient. The patient was connected to triage in response to information provided to the [facility]. Please do not respond through this encounter as the response is not directed to a shared pool.     Reason for Disposition   Purple or black skin on foot or toe    Protocols used: FOOT PAIN-ADULT-[place]

## 2022-04-05 ENCOUNTER — HOSPITAL ENCOUNTER (EMERGENCY)
Age: 77
Discharge: HOME | End: 2022-04-05
Attending: STUDENT IN AN ORGANIZED HEALTH CARE EDUCATION/TRAINING PROGRAM
Payer: MEDICARE

## 2022-04-05 VITALS
WEIGHT: 180 LBS | DIASTOLIC BLOOD PRESSURE: 98 MMHG | OXYGEN SATURATION: 98 % | HEIGHT: 66 IN | HEART RATE: 92 BPM | RESPIRATION RATE: 16 BRPM | BODY MASS INDEX: 28.93 KG/M2 | SYSTOLIC BLOOD PRESSURE: 170 MMHG | TEMPERATURE: 98 F

## 2022-04-05 DIAGNOSIS — B86 SCABIES: Primary | ICD-10-CM

## 2022-04-05 DIAGNOSIS — R21 RASH AND OTHER NONSPECIFIC SKIN ERUPTION: ICD-10-CM

## 2022-04-05 PROCEDURE — 99283 EMERGENCY DEPT VISIT LOW MDM: CPT

## 2022-04-05 RX ORDER — PERMETHRIN 50 MG/G
CREAM TOPICAL
Qty: 60 G | Refills: 0 | Status: SHIPPED | OUTPATIENT
Start: 2022-04-05 | End: 2022-05-05
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
## 2022-04-05 NOTE — DISCHARGE INSTRUCTIONS
Use new cream as directed. Wash all clothing and bedding in hot soapy water and consider pest control consult.   Follow-up with your PCP or dermatologist.

## 2022-04-07 DIAGNOSIS — G89.4 CHRONIC PAIN SYNDROME: ICD-10-CM

## 2022-04-07 DIAGNOSIS — F11.20 NARCOTIC DEPENDENCE (HCC): ICD-10-CM

## 2022-04-07 DIAGNOSIS — G99.2 STENOSIS OF CERVICAL SPINE WITH MYELOPATHY (HCC): ICD-10-CM

## 2022-04-07 DIAGNOSIS — G89.29 CHRONIC LOW BACK PAIN WITH SCIATICA, SCIATICA LATERALITY UNSPECIFIED, UNSPECIFIED BACK PAIN LATERALITY: ICD-10-CM

## 2022-04-07 DIAGNOSIS — M54.40 CHRONIC LOW BACK PAIN WITH SCIATICA, SCIATICA LATERALITY UNSPECIFIED, UNSPECIFIED BACK PAIN LATERALITY: ICD-10-CM

## 2022-04-07 DIAGNOSIS — M48.02 STENOSIS OF CERVICAL SPINE WITH MYELOPATHY (HCC): ICD-10-CM

## 2022-04-07 RX ORDER — OXYCODONE HYDROCHLORIDE 80 MG/1
80 TABLET, FILM COATED, EXTENDED RELEASE ORAL EVERY 12 HOURS
Qty: 60 TABLET | Refills: 0 | Status: SHIPPED | OUTPATIENT
Start: 2022-04-12 | End: 2022-04-22 | Stop reason: SDUPTHER

## 2022-04-10 ENCOUNTER — APPOINTMENT (OUTPATIENT)
Dept: ULTRASOUND IMAGING | Age: 77
End: 2022-04-10
Attending: EMERGENCY MEDICINE
Payer: MEDICARE

## 2022-04-10 ENCOUNTER — APPOINTMENT (OUTPATIENT)
Dept: GENERAL RADIOLOGY | Age: 77
End: 2022-04-10
Attending: EMERGENCY MEDICINE
Payer: MEDICARE

## 2022-04-10 ENCOUNTER — HOSPITAL ENCOUNTER (EMERGENCY)
Age: 77
Discharge: HOME | End: 2022-04-10
Attending: EMERGENCY MEDICINE
Payer: MEDICARE

## 2022-04-10 VITALS
DIASTOLIC BLOOD PRESSURE: 100 MMHG | SYSTOLIC BLOOD PRESSURE: 160 MMHG | RESPIRATION RATE: 18 BRPM | TEMPERATURE: 98.3 F | HEART RATE: 90 BPM | OXYGEN SATURATION: 97 %

## 2022-04-10 DIAGNOSIS — M79.605 LOW BACK PAIN RADIATING TO LEFT LOWER EXTREMITY: Primary | ICD-10-CM

## 2022-04-10 DIAGNOSIS — F11.90 OPIOID USE DISORDER: ICD-10-CM

## 2022-04-10 DIAGNOSIS — M54.50 LOW BACK PAIN RADIATING TO LEFT LOWER EXTREMITY: Primary | ICD-10-CM

## 2022-04-10 PROCEDURE — 74011250637 HC RX REV CODE- 250/637: Performed by: EMERGENCY MEDICINE

## 2022-04-10 PROCEDURE — 77030002916 HC SUT ETHLN J&J -A

## 2022-04-10 PROCEDURE — 74011000250 HC RX REV CODE- 250: Performed by: EMERGENCY MEDICINE

## 2022-04-10 PROCEDURE — 96372 THER/PROPH/DIAG INJ SC/IM: CPT

## 2022-04-10 PROCEDURE — 74011250636 HC RX REV CODE- 250/636: Performed by: EMERGENCY MEDICINE

## 2022-04-10 PROCEDURE — 99284 EMERGENCY DEPT VISIT MOD MDM: CPT

## 2022-04-10 PROCEDURE — 93971 EXTREMITY STUDY: CPT

## 2022-04-10 PROCEDURE — 72100 X-RAY EXAM L-S SPINE 2/3 VWS: CPT

## 2022-04-10 RX ORDER — OXYCODONE HYDROCHLORIDE 5 MG/1
15 TABLET ORAL
Status: COMPLETED | OUTPATIENT
Start: 2022-04-10 | End: 2022-04-10

## 2022-04-10 RX ORDER — ACETAMINOPHEN 500 MG
1000 TABLET ORAL ONCE
Status: COMPLETED | OUTPATIENT
Start: 2022-04-10 | End: 2022-04-10

## 2022-04-10 RX ORDER — LIDOCAINE 4 G/100G
1 PATCH TOPICAL EVERY 24 HOURS
Status: DISCONTINUED | OUTPATIENT
Start: 2022-04-10 | End: 2022-04-10 | Stop reason: HOSPADM

## 2022-04-10 RX ORDER — OXYCODONE HYDROCHLORIDE 5 MG/1
5 TABLET ORAL
Status: DISCONTINUED | OUTPATIENT
Start: 2022-04-10 | End: 2022-04-10

## 2022-04-10 RX ORDER — KETOROLAC TROMETHAMINE 30 MG/ML
30 INJECTION, SOLUTION INTRAMUSCULAR; INTRAVENOUS
Status: COMPLETED | OUTPATIENT
Start: 2022-04-10 | End: 2022-04-10

## 2022-04-10 RX ADMIN — ACETAMINOPHEN 1000 MG: 500 TABLET ORAL at 16:50

## 2022-04-10 RX ADMIN — OXYCODONE 15 MG: 5 TABLET ORAL at 19:44

## 2022-04-10 RX ADMIN — KETOROLAC TROMETHAMINE 30 MG: 30 INJECTION, SOLUTION INTRAMUSCULAR at 16:51

## 2022-04-10 NOTE — ED TRIAGE NOTES
Pt assisted to treatment area he states that for the past 10 days he has had left lower back pain that goes into his hip and down the left leg like his sciatica. He has been taking oxycodone and oxycontin but it is not helping.   Denies any injury or fall

## 2022-04-10 NOTE — ED NOTES
Pt refused to leave he states that he is in pain and wants  treatment. Pt is not able to get a ride by his daughter, he states that if we want him out he have to throw him out.   Informed Dr Vipul Sheriff

## 2022-04-10 NOTE — ED NOTES
Bedside shift change report given to [place] (oncoming nurse) by Angel Bowman RN (offgoing nurse). Report included the following information SBAR.
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
## 2022-04-10 NOTE — ED NOTES
ED Course as of 04/10/22 1948   [place] Apr 10, 2022   1606 Patient examined. Patient no acute distress. Said pain in the left calf area as well as pain in the left back going down to the left leg. History of sciatica. States that this is worse. No history of blood clots. My exam lower extremity without any edema. Does have tenderness palpation posterior left calf. Will get duplex ultrasound. Patient is also IBS typical sciatica. Will give pain medication. Will get x-ray lumbar spine. [AF]   1702 XR SPINE LUMBAR 2 OR 3 VIEWS [AF]   1802 DUPLEX LOWER EXT VENOUS LEFT [AF]   1820 Patient rechecked. Patient states that he ran out of his pain medicine will call his primary care doctor tomorrow. Will give him 1 pain pill here states requesting it. Explained to patient that we cannot refill his chronic pain medication. Patient states that his daughter will be driving him home. Patient given return to ED instructions. Patient verbalized understanding and agreement plan [AF]   [id number] Patient does not ride home. Patient is demanding opioids. Patient I cannot give him opioid medication and then allow him to drive home. I explained patient that I will not refill his chronic opioid medication. I said to the patient that we do not do this here in the emergency department. He then became very irate and saying that he would not leave until he got his opioids. Explained to patient that I have given medications for his pain and I could give him no. Here if you would have a ride home where he does not have 1. [AF]   [address]  [place] reviewed. He should have plenty of his oxycodone immediate release and OxyContin as they were just recently prescribed. He states that he has been taking more and ran out. On reason for possible opioid use disorder versus hyperalgesia. He is able to ambulate without difficulty. [AF]   1918 7:19 PM  Change of shift.  Care of patient taken over from ; H&P reviewed, bedside handoff complete. Awaiting reeval for receiving pain medication. [ZD]   1918 Patient was refusing to leave. He is in no acute distress. He cannot get any but I did give him a ride home. Cannot get a taxi or [employer]. We will give him oxycodone 15 mg once here and observe for 4 hours. If there is no sedation or altered mental status will be will discharge home with instructions follow-up with primary care doctor. He does take OxyContin 80 mg twice a day and oxycodone immediate release 30 mg as needed. I suspect this dose medication would not cause altered mental status on him. However, we will continue to monitor to ensure that he is safe to go home. [AF]   1919 Patient signed out to overnight team. [AF]      ED Course User Index  [AF] Clarence Cervantes DO  [ZD] Rayna Yeboah MD       Patient received pain medication in ER. No signs of intoxication. Patient will wait in waiting room for couple hours prior to driving home.

## 2022-04-11 ENCOUNTER — FACE TO FACE ENCOUNTER (OUTPATIENT)
Dept: INTERNAL MEDICINE CLINIC | Age: 77
End: 2022-04-11

## 2022-04-11 ENCOUNTER — TELEPHONE (OUTPATIENT)
Dept: INTERNAL MEDICINE CLINIC | Age: 77
End: 2022-04-11

## 2022-04-11 NOTE — TELEPHONE ENCOUNTER
Patient showed up in [place] area requesting to see Dr. Virginia Coleman regarding his pain medication. He reports he is out of medication. He reports he went ER yesterday and they gave him pain medication while he was there and he was told to see his PCP. Patient does not have an appointment but reports that he is in pain and has no pain medication left. He admits to taking extra a few times when the pain was bad but does not know why he is completely out of medication at this time. Upon review a script for Oxycodone IR 30 mg -120 pills was sent on 3/26/22 q 6 hrs-4.  pill daily. He should have at least 60 tabs left as of 4/9/22. His Oxycontin ER 80 mg script is not due to start until tomorrow. Patient reports he is out of medication and needs us to contact his pharmacy to provide him his medication today. Dr. Virginia Coleman notified.

## 2022-04-11 NOTE — TELEPHONE ENCOUNTER
Spoke with Carrie Negron Pharmacist at [facility] at [facility] and request refill of patients OxyContin ER  80 mg to be filled today. She states she will fill today. Grateful for the call.

## 2022-04-11 NOTE — PROGRESS NOTES
Patient showed up in [place] area requesting to see Dr. Isac Noriega regarding his pain medication. He reports he is out of medication. He reports he went ER yesterday and they gave him pain medication while he was there and he was told to see his PCP. Patient does not have an appointment but reports that he is in pain and has no pain medication left. He admits to taking extra a few times when the pain was bad but does not know why he is completely out of medication at this time. Upon review a script for Oxycodone IR 30 mg -120 pills was sent on 3/26/22 q 6 hrs-4.  pill daily. His Oxycontin ER 80 mg script is not due to start until tomorrow. Patient reports he is out of medication and needs us to contact his pharmacy to provide him his medication today. Dr. Isac Noriega notified.

## 2022-04-11 NOTE — TELEPHONE ENCOUNTER
Spoke with patient and advised that his script for OxyContin ER 80 mg will be filled today. Advised patient to take ad directed every 12 hrs only. He states understanding and grateful for the call.

## 2022-04-12 NOTE — PROGRESS NOTES
Reviewed and appropirate Tremfya Counseling: I discussed with the patient the risks of guselkumab including but not limited to immunosuppression, serious infections, worsening of inflammatory bowel disease and drug reactions.  The patient understands that monitoring is required including a PPD at baseline and must alert us or the primary physician if symptoms of infection or other concerning signs are noted.

## 2022-04-22 ENCOUNTER — OFFICE VISIT (OUTPATIENT)
Dept: INTERNAL MEDICINE CLINIC | Age: 77
End: 2022-04-22
Payer: MEDICARE

## 2022-04-22 VITALS
TEMPERATURE: 98.4 F | HEART RATE: 82 BPM | WEIGHT: 183.4 LBS | OXYGEN SATURATION: 98 % | HEIGHT: 66 IN | RESPIRATION RATE: 15 BRPM | SYSTOLIC BLOOD PRESSURE: 197 MMHG | DIASTOLIC BLOOD PRESSURE: 102 MMHG | BODY MASS INDEX: 29.47 KG/M2

## 2022-04-22 DIAGNOSIS — F11.20 NARCOTIC DEPENDENCE (HCC): ICD-10-CM

## 2022-04-22 DIAGNOSIS — G89.29 CHRONIC MIDLINE LOW BACK PAIN WITH LEFT-SIDED SCIATICA: ICD-10-CM

## 2022-04-22 DIAGNOSIS — M79.605 LOW BACK PAIN RADIATING TO BOTH LEGS: Primary | ICD-10-CM

## 2022-04-22 DIAGNOSIS — G99.2 STENOSIS OF CERVICAL SPINE WITH MYELOPATHY (HCC): ICD-10-CM

## 2022-04-22 DIAGNOSIS — F33.1 MODERATE EPISODE OF RECURRENT MAJOR DEPRESSIVE DISORDER (HCC): ICD-10-CM

## 2022-04-22 DIAGNOSIS — M79.604 LOW BACK PAIN RADIATING TO BOTH LEGS: Primary | ICD-10-CM

## 2022-04-22 DIAGNOSIS — M54.50 LOW BACK PAIN RADIATING TO BOTH LEGS: Primary | ICD-10-CM

## 2022-04-22 DIAGNOSIS — G89.4 CHRONIC PAIN SYNDROME: ICD-10-CM

## 2022-04-22 DIAGNOSIS — M54.42 CHRONIC MIDLINE LOW BACK PAIN WITH LEFT-SIDED SCIATICA: ICD-10-CM

## 2022-04-22 DIAGNOSIS — M48.02 STENOSIS OF CERVICAL SPINE WITH MYELOPATHY (HCC): ICD-10-CM

## 2022-04-22 DIAGNOSIS — I10 ESSENTIAL HYPERTENSION: ICD-10-CM

## 2022-04-22 DIAGNOSIS — L30.8 PSORIASIFORM DERMATITIS: ICD-10-CM

## 2022-04-22 PROCEDURE — 99214 OFFICE O/P EST MOD 30 MIN: CPT | Performed by: INTERNAL MEDICINE

## 2022-04-22 PROCEDURE — G8536 NO DOC ELDER MAL SCRN: HCPCS | Performed by: INTERNAL MEDICINE

## 2022-04-22 PROCEDURE — G8427 DOCREV CUR MEDS BY ELIG CLIN: HCPCS | Performed by: INTERNAL MEDICINE

## 2022-04-22 PROCEDURE — 3017F COLORECTAL CA SCREEN DOC REV: CPT | Performed by: INTERNAL MEDICINE

## 2022-04-22 PROCEDURE — G8419 CALC BMI OUT NRM PARAM NOF/U: HCPCS | Performed by: INTERNAL MEDICINE

## 2022-04-22 PROCEDURE — 1101F PT FALLS ASSESS-DOCD LE1/YR: CPT | Performed by: INTERNAL MEDICINE

## 2022-04-22 PROCEDURE — G0463 HOSPITAL OUTPT CLINIC VISIT: HCPCS | Performed by: INTERNAL MEDICINE

## 2022-04-22 PROCEDURE — G8755 DIAS BP > OR = 90: HCPCS | Performed by: INTERNAL MEDICINE

## 2022-04-22 PROCEDURE — G8753 SYS BP > OR = 140: HCPCS | Performed by: INTERNAL MEDICINE

## 2022-04-22 PROCEDURE — G9717 DOC PT DX DEP/BP F/U NT REQ: HCPCS | Performed by: INTERNAL MEDICINE

## 2022-04-22 RX ORDER — OXYCODONE HYDROCHLORIDE 30 MG/1
30 TABLET ORAL
Qty: 180 TABLET | Refills: 0 | Status: SHIPPED | OUTPATIENT
Start: 2022-04-22 | End: 2022-05-22

## 2022-04-22 RX ORDER — OXYCODONE HYDROCHLORIDE 80 MG/1
80 TABLET, FILM COATED, EXTENDED RELEASE ORAL EVERY 12 HOURS
Qty: 60 TABLET | Refills: 0 | Status: SHIPPED | OUTPATIENT
Start: 2022-05-10 | End: 2022-05-10 | Stop reason: SDUPTHER

## 2022-04-22 RX ORDER — DILTIAZEM HYDROCHLORIDE 120 MG/1
120 CAPSULE, COATED, EXTENDED RELEASE ORAL DAILY
Qty: 90 CAPSULE | Refills: 1 | Status: SHIPPED | OUTPATIENT
Start: 2022-04-22

## 2022-04-22 RX ORDER — OLMESARTAN MEDOXOMIL 40 MG/1
40 TABLET ORAL DAILY
Qty: 90 TABLET | Refills: 1 | Status: SHIPPED | OUTPATIENT
Start: 2022-04-22

## 2022-04-22 RX ORDER — PREDNISONE 20 MG/1
TABLET ORAL
Qty: 18 TABLET | Refills: 0 | Status: SHIPPED | OUTPATIENT
Start: 2022-04-22 | End: 2022-05-01

## 2022-04-22 NOTE — PROGRESS NOTES
HISTORY OF PRESENT ILLNESS    Chief Complaint   Patient presents with    Leg Pain     Left - pain and burning - [facility] - toe pain       He is living with his daughter in [place] and drove down here today for this appointment. Presents for follow-up of emergency room April 5 and April 10, 2022. First visit was primarily for increasing rash on his bilateral upper and lower extremities. 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 He was apparently diagnosed with scabies and given permethrin cream.  Second visit was for increasing left lower extremity pain. He has chronic lower back pain radiating to both extremities but began having extreme, searing, severe pain radiating to his left lateral leg. Denies any swelling. Lower extremity duplex showed no evidence of DVT. Lumbar x-ray showed moderate to severe multilevel degenerative spondylosis. No evidence of fracture. Since he was on chronic narcotic therapy, he was not allowed to receive any narcotics but eventually was given 15 mg of oxycodone and observed for 4 hours. He is prescribed chronic high-dose opiates for chronic severe pain. Opiate dependent. Takes OxyContin ER 80 mg twice daily, last filled April 11. Also takes oxycodone 30 mg every 6 hours as needed, last filled March 29. Pain is recently uncontrolled on the left lower extremity so he is now out of oxycodone. Rash overall is improved at baseline. 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    has a past surgical history that includes hx lumbar laminectomy (1991) and hx colonoscopy (2008). reports that he has never smoked. He has never used smokeless tobacco. He reports that he does not drink alcohol and does not use drugs. family history includes Cancer in his mother. Physical Exam   Nursing note and vitals reviewed. Blood pressure (!) 197/102, pulse 82, temperature 98.4 °F (36.9 °C), temperature source Oral, resp. rate 15, height 5' 6\" (1.676 m), weight 183 lb 6.4 oz (83.2 kg), SpO2 98 %. Constitutional:  No distress. Eyes: Conjunctivae are normal.   Ears:  Hearing grossly intact  Cardiovascular: Normal rate. regular rhythm, no murmurs or gallops  No edema  Pulmonary/Chest: Effort normal.   CTAB  Musculoskeletal: moves all 4 extremities   Neurological: Alert and oriented to person, place, and time. Markedly impaired gait, dragging left leg at times. Skin: No visible rash noted. Psychiatric: Blunted affect, appears uncomfortable. Assessment and Plan    Diagnoses and all orders for this visit:    1. Low back pain radiating to both legs  2. Chronic midline low back pain with left-sided sciatica  3. Chronic pain syndrome  4. Stenosis of cervical spine with myelopathy ([address]  5. Narcotic dependence ([address]   Acute on chronic lower back pain with left radicular symptoms that are new. With his high opiate requirement, most interventional pain specialists and surgeons are going to be very much opposed to caring for him, as we have sustained in the past.  Prescription for prednisone taper which will hopefully help the radicular symptoms. Continue OxyContin twice daily, next refill 5/10/2022 sent to pharm  Will increase and refill oxycodone 30 mg to allow refill today. This is 6 days early based on his previous prescription, but needs early refill because of acute pain. Will increase quantity to #180 and write prescription as every 4 hours as needed, but I have instructed him to try to take it every 4-6 hours.   Should last 30+ days until 5/21/2022  Will need to consider repeat imaging and referral.  Poor overall prognosis and I do not see anything sort of spinal fusion helping him and he is not very willing to undergo surgery.  profile was accessed online for [facility] and reviewed by me during this encounter. I did not see evidence of inappropriate or suspicious controlled substance prescription activity  -     oxyCODONE ER (OxyCONTIN) 80 mg ER tablet; Take 1 Tablet by mouth every twelve (12) hours for 30 days. Max Daily Amount: 160 mg. For basal chronic pain. Indications: severe chronic pain with opioid tolerance  -     oxyCODONE IR (ROXICODONE) 30 mg immediate release tablet; Take 1 Tablet by mouth every four (4) hours as needed for Pain for up to 30 days. Max Daily Amount: 180 mg. Increased quantity. Please fill today 4/22/22 due to acute on chronic pain    6. Essential hypertension  Markedly uncontrolled. Did not take medication today since he drove down here. Strongly encourage compliance. Refilled. -     dilTIAZem ER (CARDIZEM CD) 120 mg capsule; Take 1 Capsule by mouth daily. -     olmesartan (BENICAR) 40 mg tablet; Take 1 Tablet by mouth daily. Replaces lisinopril for blood pressure    7. Moderate episode of recurrent major depressive disorder ([address]  Remains chronically depressed secondary to severe pain, strain relationship with his wife. Declines any additional medication. 8. Psoriasiform dermatitis  Recent flare. I am not thinking this is scabies.   We will treat with prednisone as well.    lab results and schedule of future lab studies reviewed with patient  reviewed medications and side effects in detail    Return to clinic for further evaluation if new symptoms develop        Current Outpatient Medications   Medication Sig    predniSONE (DELTASONE) 20 mg tablet Take 3 pills for 3 days, then 2 pills for 3 days, then 1 pill for 3 days    [START ON 5/10/2022] oxyCODONE ER (OxyCONTIN) 80 mg ER tablet Take 1 Tablet by mouth every twelve (12) hours for 30 days. Max Daily Amount: 160 mg. For basal chronic pain. Indications: severe chronic pain with opioid tolerance    oxyCODONE IR (ROXICODONE) 30 mg immediate release tablet Take 1 Tablet by mouth every four (4) hours as needed for Pain for up to 30 days. Max Daily Amount: 180 mg. Increased quantity. Please fill today 4/22/22 due to acute on chronic pain    dilTIAZem ER (CARDIZEM CD) 120 mg capsule Take 1 Capsule by mouth daily.  olmesartan (BENICAR) 40 mg tablet Take 1 Tablet by mouth daily. Replaces lisinopril for blood pressure    permethrin (ACTICIN) 5 % topical cream Apply topically over entire body at nighttime x1 day and leave on for 8 to 14 hours. Wash off in the morning    triamcinolone acetonide (KENALOG) 0.1 % topical cream Apply  to affected area two (2) times a day for 180 days.  colloidal oatmeal (Eczema Relief) 1 % crea Use as recommended by derm     No current facility-administered medications for this visit.

## 2022-05-10 DIAGNOSIS — F11.20 NARCOTIC DEPENDENCE (HCC): ICD-10-CM

## 2022-05-10 DIAGNOSIS — G89.4 CHRONIC PAIN SYNDROME: ICD-10-CM

## 2022-05-10 DIAGNOSIS — M48.02 STENOSIS OF CERVICAL SPINE WITH MYELOPATHY (HCC): ICD-10-CM

## 2022-05-10 DIAGNOSIS — G99.2 STENOSIS OF CERVICAL SPINE WITH MYELOPATHY (HCC): ICD-10-CM

## 2022-05-10 RX ORDER — OXYCODONE HYDROCHLORIDE 80 MG/1
80 TABLET, FILM COATED, EXTENDED RELEASE ORAL EVERY 12 HOURS
Qty: 60 TABLET | Refills: 0 | Status: SHIPPED | OUTPATIENT
Start: 2022-05-10 | End: 2022-06-06 | Stop reason: SDUPTHER

## 2022-05-10 RX ORDER — OXYCODONE HYDROCHLORIDE 80 MG/1
80 TABLET, FILM COATED, EXTENDED RELEASE ORAL EVERY 12 HOURS
Qty: 60 TABLET | Refills: 0 | Status: CANCELLED | OUTPATIENT
Start: 2022-05-10 | End: 2022-06-09

## 2022-05-10 NOTE — TELEPHONE ENCOUNTER
T/C to patient and updated that his script should be ready at his pharmacy. He states understanding and grateful for the call.

## 2022-06-06 DIAGNOSIS — M48.02 STENOSIS OF CERVICAL SPINE WITH MYELOPATHY (HCC): ICD-10-CM

## 2022-06-06 DIAGNOSIS — G89.4 CHRONIC PAIN SYNDROME: ICD-10-CM

## 2022-06-06 DIAGNOSIS — G99.2 STENOSIS OF CERVICAL SPINE WITH MYELOPATHY (HCC): ICD-10-CM

## 2022-06-06 DIAGNOSIS — F11.20 NARCOTIC DEPENDENCE (HCC): ICD-10-CM

## 2022-06-07 RX ORDER — OXYCODONE HYDROCHLORIDE 80 MG/1
80 TABLET, FILM COATED, EXTENDED RELEASE ORAL EVERY 12 HOURS
Qty: 60 TABLET | Refills: 0 | Status: SHIPPED | OUTPATIENT
Start: 2022-06-07 | End: 2022-07-05 | Stop reason: SDUPTHER

## 2022-06-20 DIAGNOSIS — M48.02 STENOSIS OF CERVICAL SPINE WITH MYELOPATHY (HCC): ICD-10-CM

## 2022-06-20 DIAGNOSIS — F11.20 NARCOTIC DEPENDENCE (HCC): ICD-10-CM

## 2022-06-20 DIAGNOSIS — G99.2 STENOSIS OF CERVICAL SPINE WITH MYELOPATHY (HCC): ICD-10-CM

## 2022-06-20 DIAGNOSIS — G89.4 CHRONIC PAIN SYNDROME: ICD-10-CM

## 2022-06-20 RX ORDER — OXYCODONE HYDROCHLORIDE 80 MG/1
80 TABLET, FILM COATED, EXTENDED RELEASE ORAL EVERY 12 HOURS
Qty: 60 TABLET | Refills: 0 | Status: CANCELLED | OUTPATIENT
Start: 2022-06-20 | End: 2022-07-20

## 2022-06-20 RX ORDER — OXYCODONE HYDROCHLORIDE 30 MG/1
30 TABLET ORAL EVERY 6 HOURS
Qty: 120 TABLET | Refills: 0 | Status: SHIPPED | OUTPATIENT
Start: 2022-06-21 | End: 2022-07-20 | Stop reason: SDUPTHER

## 2022-06-20 NOTE — TELEPHONE ENCOUNTER
Spoke with patient and clarified which narcotic he was reordering and it is the Oxycodone IR 30 mg. Request corrected.  Confirmed which pharmacy it should go to [facility] [place]

## 2022-07-05 DIAGNOSIS — F11.20 NARCOTIC DEPENDENCE (HCC): ICD-10-CM

## 2022-07-05 DIAGNOSIS — M48.02 STENOSIS OF CERVICAL SPINE WITH MYELOPATHY (HCC): ICD-10-CM

## 2022-07-05 DIAGNOSIS — G99.2 STENOSIS OF CERVICAL SPINE WITH MYELOPATHY (HCC): ICD-10-CM

## 2022-07-05 DIAGNOSIS — G89.4 CHRONIC PAIN SYNDROME: ICD-10-CM

## 2022-07-05 RX ORDER — OXYCODONE HYDROCHLORIDE 80 MG/1
80 TABLET, FILM COATED, EXTENDED RELEASE ORAL EVERY 12 HOURS
Qty: 60 TABLET | Refills: 0 | Status: SHIPPED | OUTPATIENT
Start: 2022-07-06 | End: 2022-07-07 | Stop reason: SDUPTHER

## 2022-07-07 ENCOUNTER — TELEPHONE (OUTPATIENT)
Dept: INTERNAL MEDICINE CLINIC | Age: 77
End: 2022-07-07

## 2022-07-07 DIAGNOSIS — F11.20 NARCOTIC DEPENDENCE (HCC): ICD-10-CM

## 2022-07-07 DIAGNOSIS — G89.4 CHRONIC PAIN SYNDROME: ICD-10-CM

## 2022-07-07 DIAGNOSIS — M48.02 STENOSIS OF CERVICAL SPINE WITH MYELOPATHY (HCC): ICD-10-CM

## 2022-07-07 DIAGNOSIS — G99.2 STENOSIS OF CERVICAL SPINE WITH MYELOPATHY (HCC): ICD-10-CM

## 2022-07-07 RX ORDER — OXYCODONE HYDROCHLORIDE 80 MG/1
80 TABLET, FILM COATED, EXTENDED RELEASE ORAL EVERY 12 HOURS
Qty: 50 TABLET | Refills: 0 | Status: SHIPPED | OUTPATIENT
Start: 2022-07-07 | End: 2022-08-01

## 2022-07-07 RX ORDER — OXYCODONE HYDROCHLORIDE 80 MG/1
80 TABLET, FILM COATED, EXTENDED RELEASE ORAL EVERY 12 HOURS
Qty: 50 TABLET | Refills: 0 | Status: SHIPPED | OUTPATIENT
Start: 2022-07-07 | End: 2022-07-07 | Stop reason: SDUPTHER

## 2022-07-07 RX ORDER — OXYCODONE HYDROCHLORIDE 80 MG/1
80 TABLET, FILM COATED, EXTENDED RELEASE ORAL EVERY 12 HOURS
Qty: 60 TABLET | Refills: 0 | Status: CANCELLED | OUTPATIENT
Start: 2022-07-07 | End: 2022-08-06

## 2022-07-07 NOTE — TELEPHONE ENCOUNTER
He is due for refill of this high dose opiate and is dependent, likely having withdrawal s/s. I sent in the refill for 50 pills to Doc on [address]. This is not [facility], but was the one in his chart.   IS THIS THE CORRECT ONE???

## 2022-07-07 NOTE — TELEPHONE ENCOUNTER
Rx sent to [address] [place] [address] now.    The prior messages never mentioned this pharmacy

## 2022-07-07 NOTE — TELEPHONE ENCOUNTER
T/C to patient to advise that script for his requested medication OxyContin 80 mg  was sent yesterday 6/6/22. Duplicate discontinued.  No answer and LVM

## 2022-07-07 NOTE — TELEPHONE ENCOUNTER
Spoke with patient and he reports that his [facility] pharmacy does not have any of his medication and it was on back order. Patient is highly agitated and concerned. Advised that I would ask Dr. Cynthia Holm to send script to the [facility]. He states understanding and requests that it be done immediately. He can not go with out it. Spoke with [facility] and they have 50 tablets available. Dr. Cynthia Holm notified.

## 2022-07-20 DIAGNOSIS — M48.02 STENOSIS OF CERVICAL SPINE WITH MYELOPATHY (HCC): ICD-10-CM

## 2022-07-20 DIAGNOSIS — F11.20 NARCOTIC DEPENDENCE (HCC): ICD-10-CM

## 2022-07-20 DIAGNOSIS — G89.4 CHRONIC PAIN SYNDROME: ICD-10-CM

## 2022-07-20 DIAGNOSIS — G99.2 STENOSIS OF CERVICAL SPINE WITH MYELOPATHY (HCC): ICD-10-CM

## 2022-07-20 RX ORDER — OXYCODONE HYDROCHLORIDE 30 MG/1
30 TABLET ORAL EVERY 6 HOURS
Qty: 120 TABLET | Refills: 0 | Status: SHIPPED | OUTPATIENT
Start: 2022-07-20 | End: 2022-08-18 | Stop reason: SDUPTHER

## 2022-07-20 NOTE — TELEPHONE ENCOUNTER
Requested Prescriptions     Pending Prescriptions Disp Refills    oxyCODONE IR (ROXICODONE) 30 mg immediate release tablet 120 Tablet 0     Sig: Take 1 Tablet by mouth every six (6) hours for 30 days.  Max Daily Amount: 120 mg.     Verified pharmacy

## 2022-08-05 DIAGNOSIS — G99.2 STENOSIS OF CERVICAL SPINE WITH MYELOPATHY (HCC): ICD-10-CM

## 2022-08-05 DIAGNOSIS — F11.20 NARCOTIC DEPENDENCE (HCC): ICD-10-CM

## 2022-08-05 DIAGNOSIS — M48.02 STENOSIS OF CERVICAL SPINE WITH MYELOPATHY (HCC): ICD-10-CM

## 2022-08-05 DIAGNOSIS — G89.4 CHRONIC PAIN SYNDROME: ICD-10-CM

## 2022-08-05 RX ORDER — OXYCODONE HYDROCHLORIDE 80 MG/1
80 TABLET, FILM COATED, EXTENDED RELEASE ORAL EVERY 12 HOURS
Qty: 60 TABLET | Refills: 0 | Status: SHIPPED | OUTPATIENT
Start: 2022-08-05 | End: 2022-09-06 | Stop reason: SDUPTHER

## 2022-08-05 RX ORDER — OXYCODONE HYDROCHLORIDE 30 MG/1
30 TABLET ORAL EVERY 6 HOURS
Qty: 120 TABLET | Refills: 0 | Status: CANCELLED | OUTPATIENT
Start: 2022-08-05 | End: 2022-09-04

## 2022-08-18 DIAGNOSIS — G99.2 STENOSIS OF CERVICAL SPINE WITH MYELOPATHY (HCC): ICD-10-CM

## 2022-08-18 DIAGNOSIS — G89.4 CHRONIC PAIN SYNDROME: ICD-10-CM

## 2022-08-18 DIAGNOSIS — M48.02 STENOSIS OF CERVICAL SPINE WITH MYELOPATHY (HCC): ICD-10-CM

## 2022-08-19 RX ORDER — OXYCODONE HYDROCHLORIDE 30 MG/1
30 TABLET ORAL EVERY 6 HOURS
Qty: 120 TABLET | Refills: 0 | Status: SHIPPED | OUTPATIENT
Start: 2022-08-19 | End: 2022-09-06 | Stop reason: SDUPTHER

## 2022-09-06 DIAGNOSIS — M48.02 STENOSIS OF CERVICAL SPINE WITH MYELOPATHY (HCC): ICD-10-CM

## 2022-09-06 DIAGNOSIS — G99.2 STENOSIS OF CERVICAL SPINE WITH MYELOPATHY (HCC): ICD-10-CM

## 2022-09-06 DIAGNOSIS — F11.20 NARCOTIC DEPENDENCE (HCC): ICD-10-CM

## 2022-09-06 DIAGNOSIS — G89.4 CHRONIC PAIN SYNDROME: ICD-10-CM

## 2022-09-06 RX ORDER — OXYCODONE HYDROCHLORIDE 80 MG/1
80 TABLET, FILM COATED, EXTENDED RELEASE ORAL EVERY 12 HOURS
Qty: 60 TABLET | Refills: 0 | Status: SHIPPED | OUTPATIENT
Start: 2022-09-06 | End: 2022-10-06 | Stop reason: SDUPTHER

## 2022-09-06 RX ORDER — OXYCODONE HYDROCHLORIDE 30 MG/1
30 TABLET ORAL EVERY 6 HOURS
Qty: 120 TABLET | Refills: 0 | Status: SHIPPED | OUTPATIENT
Start: 2022-09-18 | End: 2022-10-18 | Stop reason: SDUPTHER

## 2022-09-16 DIAGNOSIS — M48.02 STENOSIS OF CERVICAL SPINE WITH MYELOPATHY (HCC): ICD-10-CM

## 2022-09-16 DIAGNOSIS — G89.4 CHRONIC PAIN SYNDROME: ICD-10-CM

## 2022-09-16 DIAGNOSIS — G99.2 STENOSIS OF CERVICAL SPINE WITH MYELOPATHY (HCC): ICD-10-CM

## 2022-09-16 RX ORDER — OXYCODONE HYDROCHLORIDE 30 MG/1
30 TABLET ORAL EVERY 6 HOURS
Qty: 120 TABLET | Refills: 0 | Status: CANCELLED | OUTPATIENT
Start: 2022-09-18 | End: 2022-10-18

## 2022-09-16 NOTE — TELEPHONE ENCOUNTER
T/C to patient to advise that his prescription was sent to his pharmacy and that it is eligible for  on 9/18/22. No answer and LVM.

## 2022-10-06 DIAGNOSIS — G89.4 CHRONIC PAIN SYNDROME: ICD-10-CM

## 2022-10-06 DIAGNOSIS — M48.02 STENOSIS OF CERVICAL SPINE WITH MYELOPATHY (HCC): ICD-10-CM

## 2022-10-06 DIAGNOSIS — F11.20 NARCOTIC DEPENDENCE (HCC): ICD-10-CM

## 2022-10-06 DIAGNOSIS — G99.2 STENOSIS OF CERVICAL SPINE WITH MYELOPATHY (HCC): ICD-10-CM

## 2022-10-06 RX ORDER — OXYCODONE HYDROCHLORIDE 80 MG/1
80 TABLET, FILM COATED, EXTENDED RELEASE ORAL EVERY 12 HOURS
Qty: 60 TABLET | Refills: 0 | Status: SHIPPED | OUTPATIENT
Start: 2022-10-06 | End: 2022-10-07 | Stop reason: SDUPTHER

## 2022-10-07 ENCOUNTER — TELEPHONE (OUTPATIENT)
Dept: INTERNAL MEDICINE CLINIC | Age: 77
End: 2022-10-07

## 2022-10-07 DIAGNOSIS — G99.2 STENOSIS OF CERVICAL SPINE WITH MYELOPATHY (HCC): ICD-10-CM

## 2022-10-07 DIAGNOSIS — F11.20 NARCOTIC DEPENDENCE (HCC): ICD-10-CM

## 2022-10-07 DIAGNOSIS — G89.4 CHRONIC PAIN SYNDROME: ICD-10-CM

## 2022-10-07 DIAGNOSIS — M48.02 STENOSIS OF CERVICAL SPINE WITH MYELOPATHY (HCC): ICD-10-CM

## 2022-10-07 RX ORDER — OXYCODONE HYDROCHLORIDE 80 MG/1
80 TABLET, FILM COATED, EXTENDED RELEASE ORAL EVERY 12 HOURS
Qty: 60 TABLET | Refills: 0 | Status: SHIPPED | OUTPATIENT
Start: 2022-10-07 | End: 2022-10-07 | Stop reason: SDUPTHER

## 2022-10-07 RX ORDER — OXYCODONE HYDROCHLORIDE 80 MG/1
80 TABLET, FILM COATED, EXTENDED RELEASE ORAL EVERY 12 HOURS
Qty: 60 TABLET | Refills: 0 | Status: SHIPPED | OUTPATIENT
Start: 2022-10-07 | End: 2022-11-06

## 2022-10-07 NOTE — TELEPHONE ENCOUNTER
Spoke with patient and he reports that he finally go this medication from the [facility],  Grateful for the call.

## 2022-10-07 NOTE — TELEPHONE ENCOUNTER
Spoke with patient and updated that Dr. Bridgette Loja has sent his script for his medication to the [facility] as requested. He states understanding and grateful for the call.

## 2022-10-07 NOTE — TELEPHONE ENCOUNTER
Per [facility] , patient's oxycodone is out of stock at this location.  Requesting to have script sent to [facility] at [address] per pt request.

## 2022-10-07 NOTE — TELEPHONE ENCOUNTER
Pharmacy on file does not have the oxycodone in stock.  Please resend the this pharmacy, per patient:     Address: [facility], [address],   [place], [address] [place] [address] Street     Phone: [phone]

## 2022-10-07 NOTE — TELEPHONE ENCOUNTER
Spoke with pharmacist at [facility] and she reports that the [facility] [address] has enough. She will close the script there. Spoke with Pharmacist at [facility] and requested they discontinue their script for the patients OxyContin. She will close the order.   [facility] notified.

## 2022-10-18 DIAGNOSIS — M48.02 STENOSIS OF CERVICAL SPINE WITH MYELOPATHY (HCC): ICD-10-CM

## 2022-10-18 DIAGNOSIS — G99.2 STENOSIS OF CERVICAL SPINE WITH MYELOPATHY (HCC): ICD-10-CM

## 2022-10-18 DIAGNOSIS — G89.4 CHRONIC PAIN SYNDROME: ICD-10-CM

## 2022-10-18 RX ORDER — OXYCODONE HYDROCHLORIDE 30 MG/1
30 TABLET ORAL EVERY 6 HOURS
Qty: 120 TABLET | Refills: 0 | Status: SHIPPED | OUTPATIENT
Start: 2022-10-18 | End: 2022-11-17

## 2022-11-07 ENCOUNTER — TELEPHONE (OUTPATIENT)
Dept: INTERNAL MEDICINE CLINIC | Age: 77
End: 2022-11-07

## 2022-11-07 DIAGNOSIS — M48.02 STENOSIS OF CERVICAL SPINE WITH MYELOPATHY (HCC): ICD-10-CM

## 2022-11-07 DIAGNOSIS — G99.2 STENOSIS OF CERVICAL SPINE WITH MYELOPATHY (HCC): ICD-10-CM

## 2022-11-07 DIAGNOSIS — G89.4 CHRONIC PAIN SYNDROME: ICD-10-CM

## 2022-11-07 DIAGNOSIS — F11.20 NARCOTIC DEPENDENCE (HCC): ICD-10-CM

## 2022-11-07 RX ORDER — OXYCODONE HYDROCHLORIDE 80 MG/1
80 TABLET, FILM COATED, EXTENDED RELEASE ORAL EVERY 12 HOURS
Qty: 60 TABLET | Refills: 0 | Status: SHIPPED | OUTPATIENT
Start: 2022-11-07 | End: 2022-11-14 | Stop reason: SDUPTHER

## 2022-11-07 NOTE — TELEPHONE ENCOUNTER
Patient is calling to ask for a refill of oxyCODONE 80 mg to be sent to the [facility] on [address]. PSR can see this medication has already been requested and is pending approval by PCP.

## 2022-11-08 ENCOUNTER — TELEPHONE (OUTPATIENT)
Dept: INTERNAL MEDICINE CLINIC | Age: 77
End: 2022-11-08

## 2022-11-08 NOTE — TELEPHONE ENCOUNTER
Patient came into office today to say that he tried to pickup the oxycodone from the pharmacy but the pharmacy said they needed prior authorization from the doctor. Please look into this and refill the oxycodone 80mg 60 tablets.

## 2022-11-08 NOTE — TELEPHONE ENCOUNTER
Spoke with patient and her reports he no longer has [employer] and now [facility]  ID [id number]  [facility] [id number]  [phone]  [phone]  He report that the pharmacy gave him 7 days  worth of medication and he will stop by tomorrow and give us his new insurance card to scan in. He is grateful for the call.

## 2022-11-08 NOTE — TELEPHONE ENCOUNTER
T/C to patients Mobile # and no answer and LVM. T/C to home and spoke with Vincent/Spouse and left message for patient to please call Dr. Nida Mendoza office. Grateful for the call.

## 2022-11-10 ENCOUNTER — DOCUMENTATION ONLY (OUTPATIENT)
Dept: INTERNAL MEDICINE CLINIC | Age: 77
End: 2022-11-10

## 2022-11-10 NOTE — PROGRESS NOTES
Received from [facility] notificiation that patient Oxycodone Tab 80 mg ER does not require approval because it was previously approved  [id number] from 11/9/22 to 12/31/23. Joseline Guerrier Faxed to patients [facility] pharmacy [phone].   Елена Gaston LPN

## 2022-11-14 ENCOUNTER — TELEPHONE (OUTPATIENT)
Dept: INTERNAL MEDICINE CLINIC | Age: 77
End: 2022-11-14

## 2022-11-14 DIAGNOSIS — M48.02 STENOSIS OF CERVICAL SPINE WITH MYELOPATHY (HCC): ICD-10-CM

## 2022-11-14 DIAGNOSIS — G99.2 STENOSIS OF CERVICAL SPINE WITH MYELOPATHY (HCC): ICD-10-CM

## 2022-11-14 DIAGNOSIS — F11.20 NARCOTIC DEPENDENCE (HCC): ICD-10-CM

## 2022-11-14 DIAGNOSIS — G89.4 CHRONIC PAIN SYNDROME: ICD-10-CM

## 2022-11-14 RX ORDER — OXYCODONE HYDROCHLORIDE 80 MG/1
80 TABLET, FILM COATED, EXTENDED RELEASE ORAL EVERY 12 HOURS
Qty: 60 TABLET | Refills: 0 | Status: SHIPPED | OUTPATIENT
Start: 2022-11-14 | End: 2022-11-29 | Stop reason: SDUPTHER

## 2022-11-14 NOTE — TELEPHONE ENCOUNTER
Patient is calling to discuss his refill request for oxyCODONE ER. Patient states he believes a prior authorization was approved for the medication but [facility] still wants him to reach out to our office. Please call back and advise.

## 2022-11-14 NOTE — TELEPHONE ENCOUNTER
Patient would like to schedule an appointment with Dr. Emile Ramirez, but the soonest Dr. Emile Ramirez has available is December 15th. Please call the patient to schedule an appointment when you're next available. Thanks!

## 2022-11-25 DIAGNOSIS — G89.4 CHRONIC PAIN SYNDROME: ICD-10-CM

## 2022-11-25 DIAGNOSIS — G99.2 STENOSIS OF CERVICAL SPINE WITH MYELOPATHY (HCC): ICD-10-CM

## 2022-11-25 DIAGNOSIS — M48.02 STENOSIS OF CERVICAL SPINE WITH MYELOPATHY (HCC): ICD-10-CM

## 2022-11-25 DIAGNOSIS — F11.20 NARCOTIC DEPENDENCE (HCC): ICD-10-CM

## 2022-11-25 RX ORDER — OXYCODONE HYDROCHLORIDE 30 MG/1
30 TABLET ORAL
Qty: 120 TABLET | Refills: 0 | Status: SHIPPED | OUTPATIENT
Start: 2022-11-25 | End: 2022-11-29 | Stop reason: SDUPTHER

## 2022-11-25 RX ORDER — OXYCODONE HYDROCHLORIDE 80 MG/1
80 TABLET, FILM COATED, EXTENDED RELEASE ORAL EVERY 12 HOURS
Qty: 60 TABLET | Refills: 0 | Status: CANCELLED | OUTPATIENT
Start: 2022-11-25 | End: 2022-12-25

## 2022-11-25 NOTE — TELEPHONE ENCOUNTER
PSR accidentally selected the 80 mg oxyCODONE tablet to be refilled. This was a mistake. PSR has removed this medication from the refill  request. Please refill the 30 mg oxyCODONE medication.

## 2022-11-25 NOTE — TELEPHONE ENCOUNTER
Patient is calling to request a refill of his oxyCODONE 30 mg tablet immediate release. Patient states he is not asking for a refill of the 80 mg tablet.

## 2022-11-25 NOTE — TELEPHONE ENCOUNTER
Spoke with patient and he reports that he is totally out of the Oxycodone IR 30 mg. Grateful for the rtc.

## 2022-11-29 ENCOUNTER — OFFICE VISIT (OUTPATIENT)
Dept: INTERNAL MEDICINE CLINIC | Age: 77
End: 2022-11-29
Payer: MEDICARE

## 2022-11-29 VITALS
HEART RATE: 81 BPM | DIASTOLIC BLOOD PRESSURE: 96 MMHG | SYSTOLIC BLOOD PRESSURE: 156 MMHG | OXYGEN SATURATION: 95 % | WEIGHT: 190.2 LBS | TEMPERATURE: 98.5 F | RESPIRATION RATE: 15 BRPM | HEIGHT: 66 IN | BODY MASS INDEX: 30.57 KG/M2

## 2022-11-29 DIAGNOSIS — R51.9 LEFT TEMPORAL HEADACHE: ICD-10-CM

## 2022-11-29 DIAGNOSIS — F11.20 NARCOTIC DEPENDENCE (HCC): ICD-10-CM

## 2022-11-29 DIAGNOSIS — Z12.5 SCREENING FOR PROSTATE CANCER: ICD-10-CM

## 2022-11-29 DIAGNOSIS — G99.2 STENOSIS OF CERVICAL SPINE WITH MYELOPATHY (HCC): Primary | ICD-10-CM

## 2022-11-29 DIAGNOSIS — I10 ESSENTIAL HYPERTENSION: ICD-10-CM

## 2022-11-29 DIAGNOSIS — G89.4 CHRONIC PAIN SYNDROME: ICD-10-CM

## 2022-11-29 DIAGNOSIS — Z51.81 MEDICATION MONITORING ENCOUNTER: ICD-10-CM

## 2022-11-29 DIAGNOSIS — H57.12 LEFT EYE PAIN: ICD-10-CM

## 2022-11-29 DIAGNOSIS — M48.02 STENOSIS OF CERVICAL SPINE WITH MYELOPATHY (HCC): Primary | ICD-10-CM

## 2022-11-29 RX ORDER — OXYCODONE HYDROCHLORIDE 80 MG/1
80 TABLET, FILM COATED, EXTENDED RELEASE ORAL EVERY 12 HOURS
Qty: 60 TABLET | Refills: 0 | Status: SHIPPED | OUTPATIENT
Start: 2022-12-12 | End: 2023-01-11

## 2022-11-29 RX ORDER — OXYCODONE HYDROCHLORIDE 30 MG/1
30 TABLET ORAL
Qty: 120 TABLET | Refills: 0 | Status: SHIPPED | OUTPATIENT
Start: 2022-12-28 | End: 2023-01-27

## 2022-11-29 RX ORDER — DILTIAZEM HYDROCHLORIDE 120 MG/1
120 CAPSULE, COATED, EXTENDED RELEASE ORAL DAILY
Qty: 90 CAPSULE | Refills: 1 | Status: SHIPPED | OUTPATIENT
Start: 2022-11-29

## 2022-11-29 RX ORDER — OLMESARTAN MEDOXOMIL 40 MG/1
40 TABLET ORAL DAILY
Qty: 90 TABLET | Refills: 1 | Status: SHIPPED | OUTPATIENT
Start: 2022-11-29

## 2022-11-30 DIAGNOSIS — G89.4 CHRONIC PAIN SYNDROME: ICD-10-CM

## 2022-11-30 DIAGNOSIS — G99.2 STENOSIS OF CERVICAL SPINE WITH MYELOPATHY (HCC): ICD-10-CM

## 2022-11-30 DIAGNOSIS — M48.02 STENOSIS OF CERVICAL SPINE WITH MYELOPATHY (HCC): ICD-10-CM

## 2022-11-30 DIAGNOSIS — F11.20 NARCOTIC DEPENDENCE (HCC): ICD-10-CM

## 2022-11-30 LAB
ALBUMIN SERPL-MCNC: 3.5 G/DL (ref 3.5–5)
ALBUMIN/GLOB SERPL: 1.1 {RATIO} (ref 1.1–2.2)
ALP SERPL-CCNC: 86 U/L (ref 45–117)
ALT SERPL-CCNC: 20 U/L (ref 12–78)
ANION GAP SERPL CALC-SCNC: 6 MMOL/L (ref 5–15)
AST SERPL-CCNC: 21 U/L (ref 15–37)
BILIRUB SERPL-MCNC: 0.7 MG/DL (ref 0.2–1)
BUN SERPL-MCNC: 12 MG/DL (ref 6–20)
BUN/CREAT SERPL: 13 (ref 12–20)
CALCIUM SERPL-MCNC: 8.7 MG/DL (ref 8.5–10.1)
CHLORIDE SERPL-SCNC: 103 MMOL/L (ref 97–108)
CHOLEST SERPL-MCNC: 161 MG/DL
CO2 SERPL-SCNC: 31 MMOL/L (ref 21–32)
CREAT SERPL-MCNC: 0.96 MG/DL (ref 0.7–1.3)
ERYTHROCYTE [DISTWIDTH] IN BLOOD BY AUTOMATED COUNT: 13.9 % (ref 11.5–14.5)
GLOBULIN SER CALC-MCNC: 3.1 G/DL (ref 2–4)
GLUCOSE SERPL-MCNC: 107 MG/DL (ref 65–100)
HCT VFR BLD AUTO: 45 % (ref 36.6–50.3)
HDLC SERPL-MCNC: 65 MG/DL
HDLC SERPL: 2.5 {RATIO} (ref 0–5)
HGB BLD-MCNC: 14.8 G/DL (ref 12.1–17)
LDLC SERPL CALC-MCNC: 76.2 MG/DL (ref 0–100)
MCH RBC QN AUTO: 33 PG (ref 26–34)
MCHC RBC AUTO-ENTMCNC: 32.9 G/DL (ref 30–36.5)
MCV RBC AUTO: 100.2 FL (ref 80–99)
NRBC # BLD: 0 K/UL (ref 0–0.01)
NRBC BLD-RTO: 0 PER 100 WBC
PLATELET # BLD AUTO: 245 K/UL (ref 150–400)
PMV BLD AUTO: 11 FL (ref 8.9–12.9)
POTASSIUM SERPL-SCNC: 3.7 MMOL/L (ref 3.5–5.1)
PROT SERPL-MCNC: 6.6 G/DL (ref 6.4–8.2)
RBC # BLD AUTO: 4.49 M/UL (ref 4.1–5.7)
SODIUM SERPL-SCNC: 140 MMOL/L (ref 136–145)
TRIGL SERPL-MCNC: 99 MG/DL (ref ?–150)
VLDLC SERPL CALC-MCNC: 19.8 MG/DL
WBC # BLD AUTO: 6.8 K/UL (ref 4.1–11.1)

## 2022-11-30 RX ORDER — OXYCODONE HYDROCHLORIDE 30 MG/1
30 TABLET ORAL
Qty: 120 TABLET | Refills: 0 | Status: CANCELLED | OUTPATIENT
Start: 2022-12-28 | End: 2023-01-27
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
## 2022-11-30 NOTE — TELEPHONE ENCOUNTER
I saw him yesterday and let him know the refill was sent to [facility] by Dr Herminio Cummins 11/25!

## 2022-12-01 LAB
AMPHETAMINES UR QL SCN: NEGATIVE NG/ML
BARBITURATES UR QL SCN: NEGATIVE NG/ML
BENZODIAZ UR QL SCN: NEGATIVE NG/ML
BZE UR QL SCN: NEGATIVE NG/ML
CANNABINOIDS UR QL SCN: NEGATIVE NG/ML
CREAT UR-MCNC: 152.9 MG/DL (ref 20–300)
METHADONE UR QL SCN: NEGATIVE NG/ML
OXYCODONE+OXYMORPHONE UR QL SCN: NORMAL NG/ML
PCP UR QL: NEGATIVE NG/ML
PH UR: 6.3 [PH] (ref 4.5–8.9)
PLEASE NOTE:, 733157: NORMAL
PROPOXYPH UR QL SCN: NEGATIVE NG/ML

## 2022-12-01 NOTE — TELEPHONE ENCOUNTER
Spoke with patient and advised that his script for his Oxycodone 30 mg was already sent by Dr. Rafaela Diaz [facility]. Advised him to contact the [facility] and if there is a problem to call me back. He states understanding and grateful for the call. Request cancelled due to already ordered. Spoke with patients [facility] pharmacy and confirmed that they have his medication and will have ist ready today.

## 2022-12-02 ENCOUNTER — TELEPHONE (OUTPATIENT)
Dept: INTERNAL MEDICINE CLINIC | Age: 77
End: 2022-12-02

## 2022-12-02 DIAGNOSIS — Z12.5 SCREENING FOR PROSTATE CANCER: Primary | ICD-10-CM

## 2022-12-02 DIAGNOSIS — M19.90 ARTHRITIS: ICD-10-CM

## 2022-12-02 DIAGNOSIS — I10 ESSENTIAL HYPERTENSION: ICD-10-CM

## 2022-12-02 LAB
ERYTHROCYTE [SEDIMENTATION RATE] IN BLOOD: NORMAL MM/HR (ref 0–20)
PSA SERPL-MCNC: NORMAL NG/ML

## 2022-12-02 NOTE — TELEPHONE ENCOUNTER
----- Message from [facility] sent at 2022  3:47 PM EST -----  Regarding: Lab Specimen Problem  Contact: [phone]  Hello,    Please see information below for details regarding a problem with samples received at [facility]:    Patient Name: Chuy Maya  Patient : 1945  Test(s) affected: Sed Rate, PSA  Description of Problem: Lab error caused tests not to be performed while stable. Please place a new order and Client Services will contact the patient for recollection. If you have any questions, please call [phone] and a representative will assist you.     Thank you,    Stephanie [facility] Client Services

## 2023-01-11 DIAGNOSIS — F11.20 NARCOTIC DEPENDENCE (HCC): ICD-10-CM

## 2023-01-11 DIAGNOSIS — G89.4 CHRONIC PAIN SYNDROME: ICD-10-CM

## 2023-01-11 DIAGNOSIS — M48.02 STENOSIS OF CERVICAL SPINE WITH MYELOPATHY (HCC): ICD-10-CM

## 2023-01-11 DIAGNOSIS — G99.2 STENOSIS OF CERVICAL SPINE WITH MYELOPATHY (HCC): ICD-10-CM

## 2023-01-11 RX ORDER — OXYCODONE HYDROCHLORIDE 80 MG/1
80 TABLET, FILM COATED, EXTENDED RELEASE ORAL EVERY 12 HOURS
Qty: 60 TABLET | Refills: 0 | Status: SHIPPED | OUTPATIENT
Start: 2023-01-11 | End: 2023-02-10

## 2023-01-27 DIAGNOSIS — F11.20 NARCOTIC DEPENDENCE (HCC): ICD-10-CM

## 2023-01-27 DIAGNOSIS — G89.4 CHRONIC PAIN SYNDROME: ICD-10-CM

## 2023-01-27 DIAGNOSIS — M48.02 STENOSIS OF CERVICAL SPINE WITH MYELOPATHY (HCC): ICD-10-CM

## 2023-01-27 DIAGNOSIS — G99.2 STENOSIS OF CERVICAL SPINE WITH MYELOPATHY (HCC): ICD-10-CM

## 2023-01-27 RX ORDER — OXYCODONE HYDROCHLORIDE 30 MG/1
30 TABLET ORAL
Qty: 120 TABLET | Refills: 0 | Status: SHIPPED | OUTPATIENT
Start: 2023-01-27 | End: 2023-02-26

## 2023-02-10 DIAGNOSIS — M48.02 STENOSIS OF CERVICAL SPINE WITH MYELOPATHY (HCC): ICD-10-CM

## 2023-02-10 DIAGNOSIS — G99.2 STENOSIS OF CERVICAL SPINE WITH MYELOPATHY (HCC): ICD-10-CM

## 2023-02-10 DIAGNOSIS — F11.20 NARCOTIC DEPENDENCE (HCC): ICD-10-CM

## 2023-02-10 DIAGNOSIS — G89.4 CHRONIC PAIN SYNDROME: ICD-10-CM

## 2023-02-10 RX ORDER — OXYCODONE HYDROCHLORIDE 80 MG/1
80 TABLET, FILM COATED, EXTENDED RELEASE ORAL EVERY 12 HOURS
Qty: 60 TABLET | Refills: 0 | Status: SHIPPED | OUTPATIENT
Start: 2023-02-10 | End: 2023-03-12

## 2023-03-03 ENCOUNTER — TELEPHONE (OUTPATIENT)
Dept: INTERNAL MEDICINE CLINIC | Age: 78
End: 2023-03-03

## 2023-03-03 DIAGNOSIS — M48.02 STENOSIS OF CERVICAL SPINE WITH MYELOPATHY (HCC): ICD-10-CM

## 2023-03-03 DIAGNOSIS — M54.50 LOW BACK PAIN RADIATING TO BOTH LEGS: ICD-10-CM

## 2023-03-03 DIAGNOSIS — F11.20 NARCOTIC DEPENDENCE (HCC): ICD-10-CM

## 2023-03-03 DIAGNOSIS — G89.29 CHRONIC MIDLINE LOW BACK PAIN WITH LEFT-SIDED SCIATICA: ICD-10-CM

## 2023-03-03 DIAGNOSIS — M79.605 LOW BACK PAIN RADIATING TO BOTH LEGS: ICD-10-CM

## 2023-03-03 DIAGNOSIS — G89.4 CHRONIC PAIN SYNDROME: ICD-10-CM

## 2023-03-03 DIAGNOSIS — M79.604 LOW BACK PAIN RADIATING TO BOTH LEGS: ICD-10-CM

## 2023-03-03 DIAGNOSIS — G89.4 CHRONIC PAIN SYNDROME: Primary | ICD-10-CM

## 2023-03-03 DIAGNOSIS — M54.42 CHRONIC MIDLINE LOW BACK PAIN WITH LEFT-SIDED SCIATICA: ICD-10-CM

## 2023-03-03 DIAGNOSIS — G99.2 STENOSIS OF CERVICAL SPINE WITH MYELOPATHY (HCC): ICD-10-CM

## 2023-03-03 RX ORDER — OXYCODONE HYDROCHLORIDE 30 MG/1
30 TABLET ORAL
Qty: 120 TABLET | Refills: 0 | Status: CANCELLED | OUTPATIENT
Start: 2023-03-03 | End: 2023-04-02

## 2023-03-03 RX ORDER — OXYCODONE HYDROCHLORIDE 30 MG/1
30 TABLET ORAL
Qty: 120 TABLET | Refills: 0 | Status: SHIPPED | OUTPATIENT
Start: 2023-03-03 | End: 2023-04-02

## 2023-03-03 NOTE — TELEPHONE ENCOUNTER
Patient states he needs a refill of Oxycodone IR 30 mg. He states the 30 mg is for pain breakthrough whereas his oxycodone 80 mg is the extended release. Patient states he has been trying to get this medication refilled since 2/28.

## 2023-03-03 NOTE — TELEPHONE ENCOUNTER
Spoke with patient and advised his medication script has been sent to his pharmacy. Grateful for the call.

## 2023-03-17 ENCOUNTER — TELEPHONE (OUTPATIENT)
Dept: INTERNAL MEDICINE CLINIC | Age: 78
End: 2023-03-17

## 2023-03-17 DIAGNOSIS — G89.4 CHRONIC PAIN SYNDROME: ICD-10-CM

## 2023-03-17 DIAGNOSIS — M48.02 STENOSIS OF CERVICAL SPINE WITH MYELOPATHY (HCC): ICD-10-CM

## 2023-03-17 DIAGNOSIS — G99.2 STENOSIS OF CERVICAL SPINE WITH MYELOPATHY (HCC): ICD-10-CM

## 2023-03-17 DIAGNOSIS — F11.20 NARCOTIC DEPENDENCE (HCC): ICD-10-CM

## 2023-03-17 NOTE — TELEPHONE ENCOUNTER
Spoke with patient and advised that his medication script was sent for his Oxycodone ER 80 mg  to his pharmacy on 3/12/23 and to contact his pharmacy. He states understanding and grateful for the call.

## 2023-04-05 RX ORDER — OXYCODONE HYDROCHLORIDE 30 MG/1
30 TABLET ORAL
Qty: 120 TABLET | Refills: 0 | Status: SHIPPED
Start: 2023-04-05 | End: 2023-05-05

## 2023-04-05 RX ORDER — OXYCODONE HYDROCHLORIDE 80 MG/1
80 TABLET, FILM COATED, EXTENDED RELEASE ORAL EVERY 12 HOURS
Qty: 60 TABLET | Refills: 0 | Status: SHIPPED
Start: 2023-04-11 | End: 2023-05-11

## 2023-04-28 DIAGNOSIS — M48.02 STENOSIS OF CERVICAL SPINE WITH MYELOPATHY (HCC): ICD-10-CM

## 2023-04-28 DIAGNOSIS — G99.2 STENOSIS OF CERVICAL SPINE WITH MYELOPATHY (HCC): ICD-10-CM

## 2023-04-28 DIAGNOSIS — G89.4 CHRONIC PAIN SYNDROME: ICD-10-CM

## 2023-04-28 DIAGNOSIS — F11.20 NARCOTIC DEPENDENCE (HCC): ICD-10-CM

## 2023-04-28 RX ORDER — OXYCODONE HYDROCHLORIDE 30 MG/1
30 TABLET ORAL
Qty: 120 TABLET | Refills: 0 | Status: SHIPPED | OUTPATIENT
Start: 2023-04-28 | End: 2023-05-28

## 2023-05-08 DIAGNOSIS — F11.20 OPIOID DEPENDENCE, UNCOMPLICATED (HCC): ICD-10-CM

## 2023-05-08 DIAGNOSIS — G89.4 CHRONIC PAIN SYNDROME: Primary | ICD-10-CM

## 2023-05-08 DIAGNOSIS — G89.29 OTHER CHRONIC PAIN: ICD-10-CM

## 2023-05-08 RX ORDER — OXYCODONE HYDROCHLORIDE 80 MG/1
80 TABLET, FILM COATED, EXTENDED RELEASE ORAL EVERY 12 HOURS
Qty: 60 EACH | Refills: 0 | Status: SHIPPED | OUTPATIENT
Start: 2023-05-12 | End: 2023-05-17 | Stop reason: SDUPTHER

## 2023-05-09 DIAGNOSIS — I10 ESSENTIAL (PRIMARY) HYPERTENSION: ICD-10-CM

## 2023-05-10 RX ORDER — DILTIAZEM HYDROCHLORIDE 120 MG/1
CAPSULE, COATED, EXTENDED RELEASE ORAL
Qty: 90 CAPSULE | Refills: 1 | Status: SHIPPED | OUTPATIENT
Start: 2023-05-10

## 2023-05-17 DIAGNOSIS — G89.4 CHRONIC PAIN SYNDROME: ICD-10-CM

## 2023-05-17 DIAGNOSIS — G89.29 OTHER CHRONIC PAIN: ICD-10-CM

## 2023-05-17 DIAGNOSIS — F11.20 OPIOID DEPENDENCE, UNCOMPLICATED (HCC): ICD-10-CM

## 2023-05-17 RX ORDER — OXYCODONE HYDROCHLORIDE 80 MG/1
80 TABLET, FILM COATED, EXTENDED RELEASE ORAL EVERY 12 HOURS
Qty: 60 EACH | Refills: 0 | Status: SHIPPED | OUTPATIENT
Start: 2023-05-17 | End: 2023-06-16

## 2023-05-19 ENCOUNTER — TELEPHONE (OUTPATIENT)
Age: 78
End: 2023-05-19

## 2023-05-24 ENCOUNTER — OFFICE VISIT (OUTPATIENT)
Age: 78
End: 2023-05-24
Payer: MEDICARE

## 2023-05-24 VITALS
DIASTOLIC BLOOD PRESSURE: 88 MMHG | RESPIRATION RATE: 16 BRPM | HEART RATE: 87 BPM | SYSTOLIC BLOOD PRESSURE: 155 MMHG | BODY MASS INDEX: 32.4 KG/M2 | OXYGEN SATURATION: 95 % | HEIGHT: 66 IN | WEIGHT: 201.6 LBS | TEMPERATURE: 98.4 F

## 2023-05-24 DIAGNOSIS — G89.4 CHRONIC PAIN SYNDROME: ICD-10-CM

## 2023-05-24 DIAGNOSIS — Z53.20 COLON CANCER SCREENING DECLINED: ICD-10-CM

## 2023-05-24 DIAGNOSIS — F33.1 MAJOR DEPRESSIVE DISORDER, RECURRENT, MODERATE (HCC): ICD-10-CM

## 2023-05-24 DIAGNOSIS — L30.9 ECZEMA, UNSPECIFIED TYPE: ICD-10-CM

## 2023-05-24 DIAGNOSIS — Z00.00 MEDICARE ANNUAL WELLNESS VISIT, SUBSEQUENT: Primary | ICD-10-CM

## 2023-05-24 DIAGNOSIS — I10 ESSENTIAL (PRIMARY) HYPERTENSION: ICD-10-CM

## 2023-05-24 DIAGNOSIS — G99.2 MYELOPATHY IN DISEASES CLASSIFIED ELSEWHERE (HCC): ICD-10-CM

## 2023-05-24 DIAGNOSIS — R60.9 EDEMA, UNSPECIFIED TYPE: ICD-10-CM

## 2023-05-24 DIAGNOSIS — F11.20 NARCOTIC DEPENDENCE (HCC): ICD-10-CM

## 2023-05-24 PROBLEM — G92.8 TOXIC METABOLIC ENCEPHALOPATHY: Status: ACTIVE | Noted: 2021-06-29

## 2023-05-24 PROBLEM — I95.9 HYPOTENSION: Status: ACTIVE | Noted: 2021-06-29

## 2023-05-24 LAB
AMPHETAMINE, URINE, POC: NEGATIVE
BARBITURATES, URINE, POC: NEGATIVE
BENZODIAZEPINES, URINE, POC: NEGATIVE
COCAINE, URINE, POC: NEGATIVE
LOT EXP DATE, POC: NORMAL
Lab: NORMAL
MARIJUANA (THC), URINE, POC: NEGATIVE
MDMA/ECSTASY, URINE, POC: NEGATIVE
METHADONE, URINE, POC: NORMAL
METHAMPHETAMINE, URINE, POC: NEGATIVE
OPIATES 300, URINE, POC: NORMAL
OXYCODONE, URINE, POC: NORMAL
PHENCYCLIDINE, URINE, POC: NEGATIVE
TRICYCLIC ANTIDEPRESSANTS, URINE, POC: NEGATIVE

## 2023-05-24 PROCEDURE — 1123F ACP DISCUSS/DSCN MKR DOCD: CPT | Performed by: INTERNAL MEDICINE

## 2023-05-24 PROCEDURE — 80305 DRUG TEST PRSMV DIR OPT OBS: CPT | Performed by: INTERNAL MEDICINE

## 2023-05-24 PROCEDURE — 3079F DIAST BP 80-89 MM HG: CPT | Performed by: INTERNAL MEDICINE

## 2023-05-24 PROCEDURE — G0439 PPPS, SUBSEQ VISIT: HCPCS | Performed by: INTERNAL MEDICINE

## 2023-05-24 PROCEDURE — 3077F SYST BP >= 140 MM HG: CPT | Performed by: INTERNAL MEDICINE

## 2023-05-24 RX ORDER — TRIAMCINOLONE ACETONIDE 1 MG/G
CREAM TOPICAL
Qty: 30 G | Refills: 3 | Status: SHIPPED | OUTPATIENT
Start: 2023-05-24

## 2023-05-24 RX ORDER — OXYCODONE HYDROCHLORIDE 30 MG/1
30 TABLET ORAL EVERY 6 HOURS
COMMUNITY
Start: 2023-05-08

## 2023-05-24 RX ORDER — OLMESARTAN MEDOXOMIL 40 MG/1
40 TABLET ORAL DAILY
Qty: 90 TABLET | Refills: 1 | Status: SHIPPED | OUTPATIENT
Start: 2023-05-24

## 2023-05-24 RX ORDER — DILTIAZEM HYDROCHLORIDE 120 MG/1
120 CAPSULE, COATED, EXTENDED RELEASE ORAL DAILY
Qty: 90 CAPSULE | Refills: 1 | Status: SHIPPED | OUTPATIENT
Start: 2023-05-24

## 2023-05-24 RX ORDER — FUROSEMIDE 20 MG/1
20 TABLET ORAL DAILY PRN
Qty: 30 TABLET | Refills: 0 | Status: SHIPPED | OUTPATIENT
Start: 2023-05-24

## 2023-05-24 SDOH — HEALTH STABILITY: MENTAL HEALTH: HOW MANY STANDARD DRINKS CONTAINING ALCOHOL DO YOU HAVE ON A TYPICAL DAY?: 1 OR 2

## 2023-05-24 SDOH — ECONOMIC STABILITY: INCOME INSECURITY: IN THE LAST 12 MONTHS, WAS THERE A TIME WHEN YOU WERE NOT ABLE TO PAY THE MORTGAGE OR RENT ON TIME?: NO

## 2023-05-24 SDOH — SOCIAL STABILITY: SOCIAL INSECURITY
WITHIN THE LAST YEAR, HAVE YOU BEEN KICKED, HIT, SLAPPED, OR OTHERWISE PHYSICALLY HURT BY YOUR PARTNER OR EX-PARTNER?: NO

## 2023-05-24 SDOH — SOCIAL STABILITY: SOCIAL INSECURITY: WITHIN THE LAST YEAR, HAVE YOU BEEN AFRAID OF YOUR PARTNER OR EX-PARTNER?: NO

## 2023-05-24 SDOH — ECONOMIC STABILITY: HOUSING INSECURITY: IN THE LAST 12 MONTHS, HOW MANY PLACES HAVE YOU LIVED?: 1

## 2023-05-24 SDOH — ECONOMIC STABILITY: FOOD INSECURITY: WITHIN THE PAST 12 MONTHS, THE FOOD YOU BOUGHT JUST DIDN'T LAST AND YOU DIDN'T HAVE MONEY TO GET MORE.: NEVER TRUE

## 2023-05-24 SDOH — HEALTH STABILITY: MENTAL HEALTH: HOW OFTEN DO YOU HAVE A DRINK CONTAINING ALCOHOL?: MONTHLY OR LESS

## 2023-05-24 SDOH — ECONOMIC STABILITY: FOOD INSECURITY: WITHIN THE PAST 12 MONTHS, YOU WORRIED THAT YOUR FOOD WOULD RUN OUT BEFORE YOU GOT MONEY TO BUY MORE.: NEVER TRUE

## 2023-05-24 SDOH — ECONOMIC STABILITY: TRANSPORTATION INSECURITY
IN THE PAST 12 MONTHS, HAS THE LACK OF TRANSPORTATION KEPT YOU FROM MEDICAL APPOINTMENTS OR FROM GETTING MEDICATIONS?: NO

## 2023-05-24 SDOH — SOCIAL STABILITY: SOCIAL NETWORK: HOW OFTEN DO YOU ATTENT MEETINGS OF THE CLUB OR ORGANIZATION YOU BELONG TO?: NEVER

## 2023-05-24 SDOH — SOCIAL STABILITY: SOCIAL INSECURITY
WITHIN THE LAST YEAR, HAVE TO BEEN RAPED OR FORCED TO HAVE ANY KIND OF SEXUAL ACTIVITY BY YOUR PARTNER OR EX-PARTNER?: NO

## 2023-05-24 SDOH — ECONOMIC STABILITY: TRANSPORTATION INSECURITY
IN THE PAST 12 MONTHS, HAS LACK OF TRANSPORTATION KEPT YOU FROM MEETINGS, WORK, OR FROM GETTING THINGS NEEDED FOR DAILY LIVING?: NO

## 2023-05-24 SDOH — ECONOMIC STABILITY: INCOME INSECURITY: HOW HARD IS IT FOR YOU TO PAY FOR THE VERY BASICS LIKE FOOD, HOUSING, MEDICAL CARE, AND HEATING?: NOT HARD AT ALL

## 2023-05-24 SDOH — SOCIAL STABILITY: SOCIAL NETWORK
IN A TYPICAL WEEK, HOW MANY TIMES DO YOU TALK ON THE PHONE WITH FAMILY, FRIENDS, OR NEIGHBORS?: MORE THAN THREE TIMES A WEEK

## 2023-05-24 SDOH — SOCIAL STABILITY: SOCIAL INSECURITY: WITHIN THE LAST YEAR, HAVE YOU BEEN HUMILIATED OR EMOTIONALLY ABUSED IN OTHER WAYS BY YOUR PARTNER OR EX-PARTNER?: NO

## 2023-05-24 SDOH — SOCIAL STABILITY: SOCIAL NETWORK: HOW OFTEN DO YOU ATTEND CHURCH OR RELIGIOUS SERVICES?: NEVER

## 2023-05-24 SDOH — ECONOMIC STABILITY: HOUSING INSECURITY
IN THE LAST 12 MONTHS, WAS THERE A TIME WHEN YOU DID NOT HAVE A STEADY PLACE TO SLEEP OR SLEPT IN A SHELTER (INCLUDING NOW)?: NO

## 2023-05-24 SDOH — SOCIAL STABILITY: SOCIAL NETWORK
DO YOU BELONG TO ANY CLUBS OR ORGANIZATIONS SUCH AS CHURCH GROUPS UNIONS, FRATERNAL OR ATHLETIC GROUPS, OR SCHOOL GROUPS?: NO

## 2023-05-24 SDOH — SOCIAL STABILITY: SOCIAL NETWORK: ARE YOU MARRIED, WIDOWED, DIVORCED, SEPARATED, NEVER MARRIED, OR LIVING WITH A PARTNER?: MARRIED

## 2023-05-24 SDOH — SOCIAL STABILITY: SOCIAL NETWORK: HOW OFTEN DO YOU GET TOGETHER WITH FRIENDS OR RELATIVES?: MORE THAN THREE TIMES A WEEK

## 2023-05-24 ASSESSMENT — PATIENT HEALTH QUESTIONNAIRE - PHQ9
SUM OF ALL RESPONSES TO PHQ QUESTIONS 1-9: 1
10. IF YOU CHECKED OFF ANY PROBLEMS, HOW DIFFICULT HAVE THESE PROBLEMS MADE IT FOR YOU TO DO YOUR WORK, TAKE CARE OF THINGS AT HOME, OR GET ALONG WITH OTHER PEOPLE: 0
9. THOUGHTS THAT YOU WOULD BE BETTER OFF DEAD, OR OF HURTING YOURSELF: 0
SUM OF ALL RESPONSES TO PHQ9 QUESTIONS 1 & 2: 1
1. LITTLE INTEREST OR PLEASURE IN DOING THINGS: 0
2. FEELING DOWN, DEPRESSED OR HOPELESS: 1
6. FEELING BAD ABOUT YOURSELF - OR THAT YOU ARE A FAILURE OR HAVE LET YOURSELF OR YOUR FAMILY DOWN: 0
5. POOR APPETITE OR OVEREATING: 0
7. TROUBLE CONCENTRATING ON THINGS, SUCH AS READING THE NEWSPAPER OR WATCHING TELEVISION: 0
SUM OF ALL RESPONSES TO PHQ QUESTIONS 1-9: 1
2. FEELING DOWN, DEPRESSED OR HOPELESS: 1
4. FEELING TIRED OR HAVING LITTLE ENERGY: 0
SUM OF ALL RESPONSES TO PHQ QUESTIONS 1-9: 1
8. MOVING OR SPEAKING SO SLOWLY THAT OTHER PEOPLE COULD HAVE NOTICED. OR THE OPPOSITE, BEING SO FIGETY OR RESTLESS THAT YOU HAVE BEEN MOVING AROUND A LOT MORE THAN USUAL: 0
SUM OF ALL RESPONSES TO PHQ QUESTIONS 1-9: 1
SUM OF ALL RESPONSES TO PHQ9 QUESTIONS 1 & 2: 1
3. TROUBLE FALLING OR STAYING ASLEEP: 0
1. LITTLE INTEREST OR PLEASURE IN DOING THINGS: 0
SUM OF ALL RESPONSES TO PHQ QUESTIONS 1-9: 1

## 2023-05-24 NOTE — PROGRESS NOTES
Has oxycontin 80 mg since 2 days ago (was due 5/17), but pharmacy was out of med. Has 18-20 pills of oxycodone  30 mg left.

## 2023-05-25 NOTE — PATIENT INSTRUCTIONS
Fatigue: Care Instructions  Your Care Instructions     Fatigue is a feeling of tiredness, exhaustion, or lack of energy. You may feel fatigue because of too much or not enough activity. It can also come from stress, lack of sleep, boredom, and poor diet. Many medical problems, such as viral infections, can cause fatigue. Emotional problems, especially depression, are often the cause of fatigue. Fatigue is most often a symptom of another problem. Treatment for fatigue depends on the cause. For example, if you have fatigue because you have a certain health problem, treating this problem also treats your fatigue. If depression or anxiety is the cause, treatment may help. Follow-up care is a key part of your treatment and safety. Be sure to make and go to all appointments, and call your doctor if you are having problems. It's also a good idea to know your test results and keep a list of the medicines you take. How can you care for yourself at home? Get regular exercise. But don't overdo it. Go back and forth between rest and exercise. Get plenty of rest.  Eat a healthy diet. Do not skip meals, especially breakfast.  Reduce your use of caffeine, tobacco, and alcohol. Caffeine is most often found in coffee, tea, cola drinks, and chocolate. Limit medicines that can cause fatigue. This includes tranquilizers and cold and allergy medicines. When should you call for help? Watch closely for changes in your health, and be sure to contact your doctor if:    You have new symptoms such as fever or a rash.     Your fatigue gets worse.     You have been feeling down, depressed, or hopeless. Or you may have lost interest in things that you usually enjoy.     You are not getting better as expected. Where can you learn more? Go to http://www.woods.com/ and enter U910 to learn more about \"Fatigue: Care Instructions. \"  Current as of: October 20, 2022               Content Version: 13.6  © 8383-7794

## 2023-05-25 NOTE — PROGRESS NOTES
Medicare Annual Wellness Visit    Bibi Cantor is here for Medicare AWV and Lab Collection (Nonfasting. )    Assessment & Plan   Medicare annual wellness visit, subsequent  Colon cancer screening declined - counseled  Ria and covid booster declined    Essential (primary) hypertension  Uncontrolled. -     resume olmesartan (BENICAR) 40 MG tablet; Take 1 tablet by mouth daily, Disp-90 tablet, R-1Normal  -     cont dilTIAZem (CARDIZEM CD) 120 MG extended release capsule; Take 1 capsule by mouth daily, Disp-90 capsule, R-1Normal  Will also address edema by adding lasix  -     Urinalysis; Future  Edema, unspecified type  Chronic. 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 He says he is ok not refilling oxyocone 30 mg early since the 80 mg OxyContin plus 1-2 30 mg oxycodone pills per day control his pain. Narcotic dependence ([facility] [address]  -     AMB POC USCREEN 12 DRUG- Results of urine drug screen reviewed and are appropriate based on list of prescribed medications. Major depressive disorder, recurrent, moderate (HCC  Uncontrolled. declines med therapy or referral  Reviewed concept of depression as biochemical imbalance of neurotransmitters and rationale for treatment. Discussed deep breathing exercises, yoga, and the importance of regular exercise. Instructed patient to contact office or on-call physician promptly should condition worsen or any new symptoms appear and provided on-call telephone numbers. IF THE PATIENT HAS ANY SUICIDAL OR HOMICIDAL IDEATION, CALL THE OFFICE, DISCUSS WITH A SUPPORT MEMBER, OR GO TO THE ER IMMEDIATELY. Patient was agreeable with this plan. Eczema, unspecified type  Based on my history and review of available data, the overall control of this problem borderline controlled. Will refill and continue current medications and monitor.       Refill triamcinolone      Recommendations

## 2023-06-05 DIAGNOSIS — G89.29 OTHER CHRONIC PAIN: ICD-10-CM

## 2023-06-05 DIAGNOSIS — G89.4 CHRONIC PAIN SYNDROME: Primary | ICD-10-CM

## 2023-06-05 DIAGNOSIS — F11.20 NARCOTIC DEPENDENCE (HCC): ICD-10-CM

## 2023-06-05 RX ORDER — OXYCODONE HYDROCHLORIDE 30 MG/1
30 TABLET ORAL EVERY 6 HOURS
Qty: 120 TABLET | Refills: 0 | Status: SHIPPED | OUTPATIENT
Start: 2023-06-06 | End: 2023-07-06

## 2023-06-05 NOTE — TELEPHONE ENCOUNTER
Requesting Refill for oxyCODONE (OXY-IR) 30    [facility]   [id number] [address], [address] [facility] - F [phone]

## 2023-06-07 ENCOUNTER — TELEPHONE (OUTPATIENT)
Age: 78
End: 2023-06-07

## 2023-06-07 NOTE — TELEPHONE ENCOUNTER
Refill request for   oxyCODONE (OXY-IR) 30 MG immediate release tablet    Send to:    [facility]/pharmacy #[id number]- [address] [facility] -  [phone] - F [phone]

## 2023-06-07 NOTE — TELEPHONE ENCOUNTER
Spoke with patient and advised that Dr. Paul Cheney sent the script for his Oxy IR 30 mg to his pharmacy yesterday. He states understanding. He reports that he call them yesterday and they reports they had not received script. Advised patient to call back if it should need to go to another pharmacy due to no availability. He states understanding and grateful for the call. Cherry Hugo

## 2023-06-23 RX ORDER — FUROSEMIDE 20 MG/1
20 TABLET ORAL DAILY PRN
Qty: 30 TABLET | Refills: 2 | Status: SHIPPED | OUTPATIENT
Start: 2023-06-23

## 2023-07-05 DIAGNOSIS — G89.29 OTHER CHRONIC PAIN: ICD-10-CM

## 2023-07-05 DIAGNOSIS — G89.4 CHRONIC PAIN SYNDROME: ICD-10-CM

## 2023-07-05 DIAGNOSIS — F11.20 NARCOTIC DEPENDENCE (HCC): ICD-10-CM

## 2023-07-05 RX ORDER — OXYCODONE HYDROCHLORIDE 30 MG/1
30 TABLET ORAL EVERY 6 HOURS
Qty: 120 TABLET | Refills: 0 | Status: SHIPPED | OUTPATIENT
Start: 2023-07-05 | End: 2023-08-04

## 2023-07-05 NOTE — TELEPHONE ENCOUNTER
Refill rx request:    oxyCODONE (OXY-IR) 30 MG immediate release tablet    Send to:    [facility]/pharmacy #[id number]- [address] [facility], [address] [place] [address] [phone] - f [phone]

## 2023-07-17 DIAGNOSIS — G89.4 CHRONIC PAIN SYNDROME: ICD-10-CM

## 2023-07-17 DIAGNOSIS — G89.29 OTHER CHRONIC PAIN: Primary | ICD-10-CM

## 2023-07-17 DIAGNOSIS — F11.20 OPIOID DEPENDENCE, UNCOMPLICATED (HCC): ICD-10-CM

## 2023-07-17 RX ORDER — OXYCODONE HYDROCHLORIDE 80 MG/1
80 TABLET, FILM COATED, EXTENDED RELEASE ORAL EVERY 12 HOURS
Qty: 60 EACH | Refills: 0 | Status: SHIPPED | OUTPATIENT
Start: 2023-07-17 | End: 2023-07-18 | Stop reason: SDUPTHER

## 2023-07-17 NOTE — TELEPHONE ENCOUNTER
Patient called wanting medication refill for oxyCODONE (OXY-IR) 30 MG immediate release tablet     **But he says its for 80mg extended release 60 pills**

## 2023-07-18 ENCOUNTER — TELEPHONE (OUTPATIENT)
Age: 78
End: 2023-07-18

## 2023-07-18 DIAGNOSIS — G89.29 OTHER CHRONIC PAIN: ICD-10-CM

## 2023-07-18 DIAGNOSIS — F11.20 OPIOID DEPENDENCE, UNCOMPLICATED (HCC): ICD-10-CM

## 2023-07-18 DIAGNOSIS — G89.4 CHRONIC PAIN SYNDROME: ICD-10-CM

## 2023-07-18 RX ORDER — OXYCODONE HYDROCHLORIDE 80 MG/1
80 TABLET, FILM COATED, EXTENDED RELEASE ORAL EVERY 12 HOURS
Qty: 60 EACH | Refills: 0 | Status: SHIPPED | OUTPATIENT
Start: 2023-07-18 | End: 2023-08-17

## 2023-07-18 NOTE — TELEPHONE ENCOUNTER
Msg from pharmacy: oxyCODONE (OXYCONTIN) 80 MG extended release tablet is not carried at the location rx was sent to.

## 2023-08-04 DIAGNOSIS — G89.4 CHRONIC PAIN SYNDROME: ICD-10-CM

## 2023-08-04 DIAGNOSIS — F11.20 NARCOTIC DEPENDENCE (HCC): ICD-10-CM

## 2023-08-04 DIAGNOSIS — G89.29 OTHER CHRONIC PAIN: ICD-10-CM

## 2023-08-04 RX ORDER — OXYCODONE HYDROCHLORIDE 30 MG/1
30 TABLET ORAL
Qty: 120 TABLET | Refills: 0 | Status: SHIPPED | OUTPATIENT
Start: 2023-08-04 | End: 2023-09-03

## 2023-08-04 NOTE — TELEPHONE ENCOUNTER
Pt is calling to request Rx.  Refill on    oxyCODONE (OXY-IR) 30 MG immediate release tablet    [facility]/pharmacy #[id number]- [place], [address] [place] [address], [facility] -  [phone] - f [phone]

## 2023-08-18 ENCOUNTER — TELEPHONE (OUTPATIENT)
Age: 78
End: 2023-08-18

## 2023-08-18 DIAGNOSIS — G89.29 OTHER CHRONIC PAIN: ICD-10-CM

## 2023-08-18 DIAGNOSIS — F11.20 NARCOTIC DEPENDENCE (HCC): ICD-10-CM

## 2023-08-18 DIAGNOSIS — G89.4 CHRONIC PAIN SYNDROME: Primary | ICD-10-CM

## 2023-08-18 DIAGNOSIS — F11.20 OPIOID DEPENDENCE, UNCOMPLICATED (HCC): ICD-10-CM

## 2023-08-18 RX ORDER — OXYCODONE HYDROCHLORIDE 80 MG/1
80 TABLET, FILM COATED, EXTENDED RELEASE ORAL EVERY 12 HOURS
Qty: 60 EACH | Refills: 0 | Status: SHIPPED | OUTPATIENT
Start: 2023-08-18 | End: 2023-09-17

## 2023-08-18 NOTE — TELEPHONE ENCOUNTER
Patient is requesting an Rx refill on medication listed below.  The medication was not listed on his current medication review but he still want to put the request in.       oxyCODONE (OXYCONTIN) 80 MG extended release tablet     [facility]/pharmacy #[id number]- [place], [address] [place] [address] [phone] - F [phone]

## 2023-09-05 ENCOUNTER — TELEPHONE (OUTPATIENT)
Age: 78
End: 2023-09-05

## 2023-09-05 DIAGNOSIS — G89.4 CHRONIC PAIN SYNDROME: ICD-10-CM

## 2023-09-05 DIAGNOSIS — F11.20 NARCOTIC DEPENDENCE (HCC): ICD-10-CM

## 2023-09-05 DIAGNOSIS — G89.29 OTHER CHRONIC PAIN: ICD-10-CM

## 2023-09-05 RX ORDER — OXYCODONE HYDROCHLORIDE 30 MG/1
30 TABLET ORAL
Qty: 120 TABLET | Refills: 0 | Status: SHIPPED | OUTPATIENT
Start: 2023-09-05 | End: 2023-10-05

## 2023-09-05 NOTE — TELEPHONE ENCOUNTER
Patient is requesting a Rx Refill on the medication listed below.     oxyCODONE (OXYCONTIN) 30  Mg    [facility]/pharmacy #[id number] -  [phone]

## 2023-09-15 ENCOUNTER — TELEPHONE (OUTPATIENT)
Age: 78
End: 2023-09-15

## 2023-09-15 DIAGNOSIS — G89.29 OTHER CHRONIC PAIN: ICD-10-CM

## 2023-09-15 DIAGNOSIS — F11.20 NARCOTIC DEPENDENCE (HCC): ICD-10-CM

## 2023-09-15 DIAGNOSIS — G89.4 CHRONIC PAIN SYNDROME: ICD-10-CM

## 2023-09-15 DIAGNOSIS — F11.20 OPIOID DEPENDENCE, UNCOMPLICATED (HCC): ICD-10-CM

## 2023-09-15 RX ORDER — OXYCODONE HYDROCHLORIDE 80 MG/1
80 TABLET, FILM COATED, EXTENDED RELEASE ORAL EVERY 12 HOURS
Qty: 60 EACH | Refills: 0 | Status: SHIPPED | OUTPATIENT
Start: 2023-09-17 | End: 2023-10-17

## 2023-09-15 NOTE — TELEPHONE ENCOUNTER
Patient is calling requesting refill of medication sent to pharmacy down below.  Please advise          oxyCODONE (OXYCONTIN) 80 MG extended release tablet [[id number]]      [facility]/pharmacy #[id number]- [place], [facility] [address] - P [phone] - F [phone]

## 2023-09-20 DIAGNOSIS — R60.9 EDEMA, UNSPECIFIED TYPE: Primary | ICD-10-CM

## 2023-09-20 RX ORDER — FUROSEMIDE 20 MG/1
20 TABLET ORAL DAILY PRN
Qty: 90 TABLET | Refills: 1 | Status: SHIPPED | OUTPATIENT
Start: 2023-09-20

## 2023-10-04 DIAGNOSIS — F11.20 NARCOTIC DEPENDENCE (HCC): ICD-10-CM

## 2023-10-04 DIAGNOSIS — G89.29 OTHER CHRONIC PAIN: ICD-10-CM

## 2023-10-04 DIAGNOSIS — G89.4 CHRONIC PAIN SYNDROME: ICD-10-CM

## 2023-10-04 RX ORDER — OXYCODONE HYDROCHLORIDE 30 MG/1
30 TABLET ORAL
Qty: 120 TABLET | Refills: 0 | Status: SHIPPED | OUTPATIENT
Start: 2023-10-04 | End: 2023-11-03

## 2023-10-04 NOTE — TELEPHONE ENCOUNTER
Medication refill     oxyCODONE (OXY-IR) 30 MG immediate release tablet     [facility]/pharmacy #[id number]- [place], [address] [place] [address] [phone]

## 2023-10-06 ENCOUNTER — TELEPHONE (OUTPATIENT)
Age: 78
End: 2023-10-06

## 2023-10-06 NOTE — TELEPHONE ENCOUNTER
Spoke with Pharmacist at [address] [place] [address] regarding patients oxyCODONE (OXY-IR) 30 MG immediate release tablet ordered on 10/4/23 120 tablets. She reports that it requires a new Prior Auth through his insurance. Grateful for the call.

## 2023-10-06 NOTE — TELEPHONE ENCOUNTER
RTC to patient regarding  his medication oxyCODONE (OXY-IR) 30 MG immediate release tablet to advise that apparently required a pre auth through his insurance. Advised a pre auth request has been submitted vi cover my meds and await determination.  [address] [facility]

## 2023-10-06 NOTE — TELEPHONE ENCOUNTER
Patient said the pharmacy denied his medication oxyCODONE (OXY-IR) 30 MG immediate release tablet     He said to contact his pharmacy  [facility] [phone] - F [phone]

## 2023-10-06 NOTE — TELEPHONE ENCOUNTER
Spoke with patient and he received message regarding his medication. He asks if he can pay OOP. Advised he should contact his pharmacy because it could take up to 72 hours for an expedited review. Advised he could ask the pharmacy to provide him enough to get him through to Monday and pay OOP until the determination is made. He states understanding and grateful for the call.

## 2023-10-10 ENCOUNTER — TELEPHONE (OUTPATIENT)
Age: 78
End: 2023-10-10

## 2023-10-10 NOTE — TELEPHONE ENCOUNTER
Received a denial for patients Oxycodone 30 mg tabs for not meeting the plans cumulative morphine milligram equivalents limit requirement (MME) Limit 200 mg. Dr. Nir Gracia notified.

## 2023-10-11 NOTE — TELEPHONE ENCOUNTER
Patient chronic intractable pain, opiate dependence and tolerance.   Requires > 200 MME dose of narcotic therapy for pain management and to avoid opiate withdrawal and risk of death from withdrawal.  Tolerating Rx well long-term, as initiated by pain management specialist.

## 2023-10-17 ENCOUNTER — TELEPHONE (OUTPATIENT)
Age: 78
End: 2023-10-17

## 2023-10-17 NOTE — TELEPHONE ENCOUNTER
[facility] called the office with a nurse triage disposition patient c/o headaches, numbness on one side, and feeling unable to walk. Patient disconnected the call prior to being transferred to the office. This nurse attempted to return call to the patient to obtain more information, however he did not answer.

## 2023-10-17 NOTE — TELEPHONE ENCOUNTER
RTC to patient home and mobile numbers regarding call into [facility] for c/o headaches, numbness on one side, and feeling unable to walk. It is unclear as to where patient was calling from. No answer and LVM and advised patient to call 911 or go to ER for evaluation and treatment. Dr. Arthur Quarles notified.

## 2023-10-23 DIAGNOSIS — G89.4 CHRONIC PAIN SYNDROME: ICD-10-CM

## 2023-10-23 DIAGNOSIS — G89.29 OTHER CHRONIC PAIN: ICD-10-CM

## 2023-10-23 DIAGNOSIS — F11.20 NARCOTIC DEPENDENCE (HCC): ICD-10-CM

## 2023-10-23 RX ORDER — OXYCODONE HCL 80 MG/1
80 TABLET, FILM COATED, EXTENDED RELEASE ORAL EVERY 12 HOURS
Qty: 60 EACH | Refills: 0 | Status: SHIPPED | OUTPATIENT
Start: 2023-10-23 | End: 2023-11-22

## 2023-10-23 NOTE — TELEPHONE ENCOUNTER
Patient denied having any concerns as listed in phone message last week    Perhaps message was on the wrong patient.  shows last refill of oxycodone 30 mg #120 filled 10/10/23  and last oxycontin ER 80 mg #60 filled 9/18/23 (written by me 9/17/23). This is the first request I have seen of oxycontin 80 mg bid refill. I sent it in now. Patient chronic intractable pain, opiate dependence and tolerance. Requires > 200 MME dose of narcotic therapy for pain management and to avoid opiate withdrawal and risk of death from withdrawal.  Tolerating Rx well long-term, as initiated by pain management specialist.    Should transition to pain management doctor, but I have tried for years and no one locally would accept his insurance. He has been compliant w medical care.  profile was accessed online for [facility] and reviewed by me during this encounter. I did not see evidence of inappropriate or suspicious controlled substance prescription activity.

## 2023-10-23 NOTE — TELEPHONE ENCOUNTER
We did not d/c his meds intentionally     Form completed last week or two to try to get oxycontin re-approved. Insurance may not cover it, no matter what we say, but I can send it in  I will send in refill once below issue is clarified. He called 10/17/23 \"patient c/o headaches, numbness on one side, and feeling unable to walk\". WHAT HAPPENED?

## 2023-10-23 NOTE — TELEPHONE ENCOUNTER
Medication refill     oxyCODONE (OXY-IR) 30 MG immediate release tablet    [facility]/pharmacy #[id number]- [place], [address] [place] [address] [phone]
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
## 2023-10-25 ENCOUNTER — TELEPHONE (OUTPATIENT)
Age: 78
End: 2023-10-25

## 2023-10-25 NOTE — TELEPHONE ENCOUNTER
Spoke with patient and advised that we received approval for his Oxycodone Hydrochloride (MME) threshold limit of 420 MG Case# [id number] Appeal 10/12/23. He states understanding and grateful for the call. Approval faxed to his [facility] [phone]. Pharmacy for medication to be filled.

## 2023-10-25 NOTE — TELEPHONE ENCOUNTER
Patient is requesting a Rx.  Refill on :      oxyCODONE (OXYCONTIN) 80 MG extended release tablet      [facility]/pharmacy #[id number]- [address] [facility] [place] [phone] - F [phone]

## 2023-10-27 ENCOUNTER — TELEPHONE (OUTPATIENT)
Age: 78
End: 2023-10-27

## 2023-10-27 DIAGNOSIS — G89.29 OTHER CHRONIC PAIN: ICD-10-CM

## 2023-10-27 DIAGNOSIS — G89.4 CHRONIC PAIN SYNDROME: ICD-10-CM

## 2023-10-27 DIAGNOSIS — F11.20 NARCOTIC DEPENDENCE (HCC): ICD-10-CM

## 2023-10-27 RX ORDER — OXYCODONE HYDROCHLORIDE 80 MG/1
80 TABLET, FILM COATED, EXTENDED RELEASE ORAL EVERY 12 HOURS
Qty: 60 EACH | Refills: 0 | Status: SHIPPED | OUTPATIENT
Start: 2023-10-27 | End: 2023-11-26

## 2023-10-27 RX ORDER — OXYCODONE HYDROCHLORIDE 30 MG/1
30 TABLET ORAL
Qty: 120 TABLET | Refills: 0 | Status: SHIPPED | OUTPATIENT
Start: 2023-11-09 | End: 2023-12-09

## 2023-10-27 NOTE — TELEPHONE ENCOUNTER
Called patient and verified name and date of birth. Pt reports that he is supposed to get the IR 30mg and 80mg ER oxycontin by [employer] /brand name. Pharmacy and allergies verified. Sent to PCP for advisement.

## 2023-10-27 NOTE — TELEPHONE ENCOUNTER
The patient is requesting a call back he stated that we keep sending over the wrong brand of medication.   [phone]        oxyCODONE (OXYCONTIN) 80 MG extended release tablet        [facility]/pharmacy #[id number]- [address] [phone] - F [phone]

## 2023-10-27 NOTE — TELEPHONE ENCOUNTER
I have never specified brand before, but it does appear he has been getting brand name Oxycontin 80 mg on refills from pharmacy, last filled 9/18/23 so I will send in a refill for brand name now. The oxycodone 30 mg is likely generic and was filled 10/10/23. I send in a refill now to be filled 11/9/23 and noted that patient request [employer] brand. I think generic is adequate and will not try to do an auth for this to be brand name, if it is requested.

## 2023-11-16 ENCOUNTER — TELEPHONE (OUTPATIENT)
Age: 78
End: 2023-11-16

## 2023-11-16 DIAGNOSIS — F11.20 NARCOTIC DEPENDENCE (HCC): ICD-10-CM

## 2023-11-16 DIAGNOSIS — G89.29 OTHER CHRONIC PAIN: ICD-10-CM

## 2023-11-16 DIAGNOSIS — G89.4 CHRONIC PAIN SYNDROME: ICD-10-CM

## 2023-11-16 RX ORDER — NALOXONE HYDROCHLORIDE 4 MG/.1ML
1 SPRAY NASAL PRN
Qty: 1 EACH | Refills: 5 | Status: SHIPPED | OUTPATIENT
Start: 2023-11-16

## 2023-11-16 RX ORDER — OXYCODONE HYDROCHLORIDE 30 MG/1
30 TABLET ORAL
Qty: 120 TABLET | Refills: 0 | Status: SHIPPED | OUTPATIENT
Start: 2023-11-16 | End: 2023-12-16

## 2023-11-16 NOTE — TELEPHONE ENCOUNTER
Patient is requesting a Rx refill on the medication listed below  oxyCODONE (OXY-IR) 30 MG immediate release tablet      [facility]/pharmacy #[id number]- [facility] [place] - [place] [phone] - F [phone]

## 2023-12-04 ENCOUNTER — TELEPHONE (OUTPATIENT)
Age: 78
End: 2023-12-04

## 2023-12-04 DIAGNOSIS — G89.4 CHRONIC PAIN SYNDROME: Primary | ICD-10-CM

## 2023-12-04 DIAGNOSIS — F11.20 NARCOTIC DEPENDENCE (HCC): ICD-10-CM

## 2023-12-04 DIAGNOSIS — F11.20 OPIOID DEPENDENCE, UNCOMPLICATED (HCC): ICD-10-CM

## 2023-12-04 DIAGNOSIS — G89.29 OTHER CHRONIC PAIN: ICD-10-CM

## 2023-12-04 RX ORDER — OXYCODONE HCL 80 MG/1
80 TABLET, FILM COATED, EXTENDED RELEASE ORAL EVERY 12 HOURS
Qty: 60 EACH | Refills: 0 | Status: SHIPPED | OUTPATIENT
Start: 2023-12-04 | End: 2024-01-03

## 2023-12-04 NOTE — TELEPHONE ENCOUNTER
Patient is requesting a Rx refill on OxyContin 80 mg sent to the pharmacy listed below:  [facility]/pharmacy #[id number]- [address] [phone]

## 2023-12-05 ENCOUNTER — TELEPHONE (OUTPATIENT)
Age: 78
End: 2023-12-05

## 2023-12-05 NOTE — TELEPHONE ENCOUNTER
The patient is requesting a call back to discuss his oxyCODONE (OXYCONTIN) 80 MG extended release tablet. He stated that he had a conversation with the pharmacist an now he will like to talk to the nurse. (Its about refills )  [phone]

## 2023-12-05 NOTE — TELEPHONE ENCOUNTER
Nurse returned call. Per chart medication sent to pharmacy yesterday. Nurse advised patient to call the pharmacy and talk with someone to initiate the refill automatic system only tells patient when medication is ready for . Patient thankful for the call back.

## 2024-01-02 DIAGNOSIS — G89.4 CHRONIC PAIN SYNDROME: ICD-10-CM

## 2024-01-02 DIAGNOSIS — F11.20 OPIOID DEPENDENCE, UNCOMPLICATED (HCC): ICD-10-CM

## 2024-01-02 DIAGNOSIS — G89.29 OTHER CHRONIC PAIN: ICD-10-CM

## 2024-01-02 DIAGNOSIS — F11.20 NARCOTIC DEPENDENCE (HCC): ICD-10-CM

## 2024-01-02 RX ORDER — OXYCODONE HCL 80 MG/1
80 TABLET, FILM COATED, EXTENDED RELEASE ORAL EVERY 12 HOURS
Qty: 60 EACH | Refills: 0 | Status: SHIPPED | OUTPATIENT
Start: 2024-01-02 | End: 2024-02-01

## 2024-01-02 NOTE — TELEPHONE ENCOUNTER
Patient is calling requesting refill of medication down below.       oxyCODONE (OXYCONTIN) 80 MG extended release tablet    [facility]/pharmacy #[id number] - [place], [place] [facility] [address] [place] [phone] - f [phone]

## 2024-02-05 ENCOUNTER — TELEPHONE (OUTPATIENT)
Age: 79
End: 2024-02-05

## 2024-02-05 DIAGNOSIS — F11.20 OPIOID DEPENDENCE, UNCOMPLICATED (HCC): ICD-10-CM

## 2024-02-05 DIAGNOSIS — G89.29 CHRONIC LOW BACK PAIN, UNSPECIFIED BACK PAIN LATERALITY, UNSPECIFIED WHETHER SCIATICA PRESENT: ICD-10-CM

## 2024-02-05 DIAGNOSIS — M79.604 PAIN IN RIGHT LEG: ICD-10-CM

## 2024-02-05 DIAGNOSIS — G89.4 CHRONIC PAIN SYNDROME: Primary | ICD-10-CM

## 2024-02-05 DIAGNOSIS — M79.605 PAIN IN LEFT LEG: ICD-10-CM

## 2024-02-05 DIAGNOSIS — M54.50 CHRONIC LOW BACK PAIN, UNSPECIFIED BACK PAIN LATERALITY, UNSPECIFIED WHETHER SCIATICA PRESENT: ICD-10-CM

## 2024-02-05 RX ORDER — OXYCODONE HCL 80 MG/1
80 TABLET, FILM COATED, EXTENDED RELEASE ORAL EVERY 12 HOURS
Qty: 60 EACH | Refills: 0 | Status: SHIPPED | OUTPATIENT
Start: 2024-02-05 | End: 2024-03-06

## 2024-02-05 NOTE — TELEPHONE ENCOUNTER
Patient is calling requesting refill please send to the pharmacy down below.         oxyCODONE (OXYCONTIN) 80 MG extended release tablet    [facility]/pharmacy #[id number] - [place], [place] - [address] [place] [place] [phone] - F [phone]

## 2024-02-22 DIAGNOSIS — M79.604 PAIN IN RIGHT LEG: ICD-10-CM

## 2024-02-22 DIAGNOSIS — F11.20 OPIOID DEPENDENCE, UNCOMPLICATED (HCC): ICD-10-CM

## 2024-02-22 DIAGNOSIS — M54.50 CHRONIC LOW BACK PAIN, UNSPECIFIED BACK PAIN LATERALITY, UNSPECIFIED WHETHER SCIATICA PRESENT: ICD-10-CM

## 2024-02-22 DIAGNOSIS — G89.29 CHRONIC LOW BACK PAIN, UNSPECIFIED BACK PAIN LATERALITY, UNSPECIFIED WHETHER SCIATICA PRESENT: ICD-10-CM

## 2024-02-22 DIAGNOSIS — M79.605 PAIN IN LEFT LEG: ICD-10-CM

## 2024-02-22 DIAGNOSIS — G89.4 CHRONIC PAIN SYNDROME: ICD-10-CM

## 2024-02-22 RX ORDER — OXYCODONE HYDROCHLORIDE 30 MG/1
20 TABLET, FILM COATED, EXTENDED RELEASE ORAL EVERY 12 HOURS
Qty: 60 EACH | Refills: 0 | Status: SHIPPED | OUTPATIENT
Start: 2024-02-22 | End: 2024-03-23

## 2024-02-22 NOTE — TELEPHONE ENCOUNTER
The patient is requesting a Rx refill on the medication listed below:    oxyCODONE (OXYCONTIN) 30 MG immediate release tablet   [facility]/pharmacy #[id number] - [place], [place] - [address]  [place] [phone] - F [phone]

## 2024-02-22 NOTE — TELEPHONE ENCOUNTER
Chief Complaint   Patient presents with    Other       Requested Prescriptions     Pending Prescriptions Disp Refills    oxyCODONE (OXYCONTIN) 30 MG T12A extended release tablet 60 each 0     Sig: Take by mouth.       Allergies:  Allergies   Allergen Reactions    Bupropion Other (See Comments)     \"Stomach pressure\"    Fentanyl Other (See Comments)     Rash with patch    Morphine Other (See Comments)     Severe constipation    Oxycodone Nausea And Vomiting     Severe n/v with Xtampza only.  Can tolerate oxycontin ER and oxycodone       Last visit with clinic:  5/24/2023   Next visit with clinic: Visit date not found     Last visit with this provider: 5/24/2023   Next Visit with this provider: Visit date not found    Signed by Ryan EMERY  02/22/24  11:25 AM

## 2024-02-24 ENCOUNTER — TELEPHONE (OUTPATIENT)
Age: 79
End: 2024-02-24

## 2024-02-24 DIAGNOSIS — G89.4 CHRONIC PAIN SYNDROME: Primary | ICD-10-CM

## 2024-02-24 RX ORDER — NALOXONE HYDROCHLORIDE 4 MG/.1ML
1 SPRAY NASAL PRN
Qty: 1 EACH | Refills: 5 | Status: SHIPPED | OUTPATIENT
Start: 2024-02-24

## 2024-02-24 RX ORDER — OXYCODONE HYDROCHLORIDE 30 MG/1
30 TABLET ORAL EVERY 6 HOURS PRN
Qty: 28 TABLET | Refills: 0 | Status: SHIPPED | OUTPATIENT
Start: 2024-02-24 | End: 2024-03-02

## 2024-02-24 NOTE — TELEPHONE ENCOUNTER
He calls requesting the  ir form of oxycodone notes he got a refill on 2/22 for the 30 mg dose but it is the extended release form.  I reviewed his records and  note that he has in fact received oxy- ir in the past.  Also that he is due for a med check appt.  I did call in the oxy-ir 30 mg  q 6 hours prn #28 tablets and did advise him to make a follow up  appt to discuss further.

## 2024-03-05 DIAGNOSIS — M79.604 PAIN IN RIGHT LEG: ICD-10-CM

## 2024-03-05 DIAGNOSIS — F11.20 OPIOID DEPENDENCE, UNCOMPLICATED (HCC): ICD-10-CM

## 2024-03-05 DIAGNOSIS — M79.605 PAIN IN LEFT LEG: ICD-10-CM

## 2024-03-05 DIAGNOSIS — M54.50 CHRONIC LOW BACK PAIN, UNSPECIFIED BACK PAIN LATERALITY, UNSPECIFIED WHETHER SCIATICA PRESENT: ICD-10-CM

## 2024-03-05 DIAGNOSIS — G89.4 CHRONIC PAIN SYNDROME: ICD-10-CM

## 2024-03-05 DIAGNOSIS — G89.29 CHRONIC LOW BACK PAIN, UNSPECIFIED BACK PAIN LATERALITY, UNSPECIFIED WHETHER SCIATICA PRESENT: ICD-10-CM

## 2024-03-05 RX ORDER — OXYCODONE HYDROCHLORIDE 30 MG/1
20 TABLET, FILM COATED, EXTENDED RELEASE ORAL EVERY 12 HOURS
Qty: 60 EACH | Refills: 0 | Status: CANCELLED | OUTPATIENT
Start: 2024-03-05 | End: 2024-04-04

## 2024-03-05 RX ORDER — OXYCODONE HYDROCHLORIDE 30 MG/1
30 TABLET ORAL EVERY 6 HOURS PRN
Qty: 120 TABLET | Refills: 0 | Status: SHIPPED | OUTPATIENT
Start: 2024-03-05 | End: 2024-04-04

## 2024-03-05 RX ORDER — OXYCODONE HCL 80 MG/1
80 TABLET, FILM COATED, EXTENDED RELEASE ORAL EVERY 12 HOURS
Qty: 60 EACH | Refills: 0 | Status: SHIPPED | OUTPATIENT
Start: 2024-03-05 | End: 2024-04-04

## 2024-03-05 RX ORDER — NALOXONE HYDROCHLORIDE 4 MG/.1ML
1 SPRAY NASAL PRN
Qty: 1 EACH | Refills: 5 | Status: SHIPPED | OUTPATIENT
Start: 2024-03-05

## 2024-03-05 NOTE — TELEPHONE ENCOUNTER
Medication refill for    oxyCODONE (OXYCONTIN) 30 MG - Immediate Release     [facility]/pharmacy #[id number] - [place], [place] [facility] [address] - [place] [phone] - F [phone]

## 2024-03-05 NOTE — TELEPHONE ENCOUNTER
I sent in refills for both of his prescriptions.  OxyContin ER 80 mg every 12 hours, #60 and oxycodone IR 30 mg every 6 hours as needed #120.    He can fill both medications today and get him on the same schedule.    He last filled OxyContin ER February 5, 2024 and is due today.  He last filled oxycodone IR January 17, 2024.  He was accidentally given OxyContin ER 30 mg on February 22, 2024 when he requested IR.  This was our error.     profile was accessed online for Lorenzo Johnson and reviewed by me during this encounter.  I did not see evidence of inappropriate or suspicious controlled substance prescription activity.

## 2024-03-05 NOTE — TELEPHONE ENCOUNTER
T/C to patient and LVM regarding refill of his pain medications was sent today to his requested pharmacy. . No answer and LVM

## 2024-04-04 DIAGNOSIS — G89.4 CHRONIC PAIN SYNDROME: ICD-10-CM

## 2024-04-04 DIAGNOSIS — G89.29 CHRONIC LOW BACK PAIN, UNSPECIFIED BACK PAIN LATERALITY, UNSPECIFIED WHETHER SCIATICA PRESENT: ICD-10-CM

## 2024-04-04 DIAGNOSIS — F11.20 OPIOID DEPENDENCE, UNCOMPLICATED (HCC): ICD-10-CM

## 2024-04-04 DIAGNOSIS — M79.605 PAIN IN LEFT LEG: ICD-10-CM

## 2024-04-04 DIAGNOSIS — M54.50 CHRONIC LOW BACK PAIN, UNSPECIFIED BACK PAIN LATERALITY, UNSPECIFIED WHETHER SCIATICA PRESENT: ICD-10-CM

## 2024-04-04 DIAGNOSIS — M79.604 PAIN IN RIGHT LEG: ICD-10-CM

## 2024-04-04 RX ORDER — OXYCODONE HCL 80 MG/1
80 TABLET, FILM COATED, EXTENDED RELEASE ORAL EVERY 12 HOURS
Qty: 60 EACH | Refills: 0 | Status: SHIPPED | OUTPATIENT
Start: 2024-04-04 | End: 2024-05-04

## 2024-04-04 RX ORDER — OXYCODONE HYDROCHLORIDE 30 MG/1
30 TABLET ORAL EVERY 6 HOURS PRN
Qty: 120 TABLET | Refills: 0 | Status: SHIPPED | OUTPATIENT
Start: 2024-04-04 | End: 2024-05-04

## 2024-04-04 NOTE — TELEPHONE ENCOUNTER
Patient was calling to refill of medications:    OxyContin ER 80 mg every 12 hours, #60 and oxycodone IR 30 mg every 6 hours as needed #120     Patient states he has appointment tomorrow with provider but would like these called in today if possible     [id number] [place] [place] [facility] [address] [facility] [address] P [phone] - F [phone] [address] [facility] [address]  Phone: [phone]  Fax: [phone]

## 2024-04-05 ENCOUNTER — OFFICE VISIT (OUTPATIENT)
Age: 79
End: 2024-04-05
Payer: MEDICARE

## 2024-04-05 VITALS
DIASTOLIC BLOOD PRESSURE: 75 MMHG | WEIGHT: 189.4 LBS | BODY MASS INDEX: 30.44 KG/M2 | OXYGEN SATURATION: 95 % | HEIGHT: 66 IN | RESPIRATION RATE: 17 BRPM | TEMPERATURE: 98 F | HEART RATE: 117 BPM | SYSTOLIC BLOOD PRESSURE: 113 MMHG

## 2024-04-05 DIAGNOSIS — I10 ESSENTIAL HYPERTENSION: ICD-10-CM

## 2024-04-05 DIAGNOSIS — M79.605 LOW BACK PAIN RADIATING TO BOTH LEGS: ICD-10-CM

## 2024-04-05 DIAGNOSIS — M54.50 LOW BACK PAIN RADIATING TO BOTH LEGS: ICD-10-CM

## 2024-04-05 DIAGNOSIS — R53.83 OTHER FATIGUE: ICD-10-CM

## 2024-04-05 DIAGNOSIS — M79.604 LOW BACK PAIN RADIATING TO BOTH LEGS: ICD-10-CM

## 2024-04-05 DIAGNOSIS — E78.00 PURE HYPERCHOLESTEROLEMIA: ICD-10-CM

## 2024-04-05 DIAGNOSIS — Z12.5 SCREENING FOR PROSTATE CANCER: ICD-10-CM

## 2024-04-05 DIAGNOSIS — F33.1 MAJOR DEPRESSIVE DISORDER, RECURRENT, MODERATE (HCC): ICD-10-CM

## 2024-04-05 DIAGNOSIS — F11.20 NARCOTIC DEPENDENCE (HCC): ICD-10-CM

## 2024-04-05 DIAGNOSIS — R53.83 OTHER FATIGUE: Primary | ICD-10-CM

## 2024-04-05 PROBLEM — Z71.89 ADVANCED CARE PLANNING/COUNSELING DISCUSSION: Status: RESOLVED | Noted: 2017-08-03 | Resolved: 2024-04-05

## 2024-04-05 PROBLEM — I95.9 HYPOTENSION: Status: RESOLVED | Noted: 2021-06-29 | Resolved: 2024-04-05

## 2024-04-05 PROBLEM — G92.8 TOXIC METABOLIC ENCEPHALOPATHY: Status: RESOLVED | Noted: 2021-06-29 | Resolved: 2024-04-05

## 2024-04-05 LAB
ALBUMIN SERPL-MCNC: 3.6 G/DL (ref 3.5–5)
ALBUMIN/GLOB SERPL: 1.1 (ref 1.1–2.2)
ALP SERPL-CCNC: 116 U/L (ref 45–117)
ALT SERPL-CCNC: 29 U/L (ref 12–78)
AMPHETAMINE, URINE, POC: NEGATIVE
ANION GAP SERPL CALC-SCNC: 6 MMOL/L (ref 5–15)
AST SERPL-CCNC: 40 U/L (ref 15–37)
BARBITURATES, URINE, POC: NEGATIVE
BENZODIAZEPINES, URINE, POC: NEGATIVE
BILIRUB SERPL-MCNC: 0.7 MG/DL (ref 0.2–1)
BUN SERPL-MCNC: 14 MG/DL (ref 6–20)
BUN/CREAT SERPL: 9 (ref 12–20)
CALCIUM SERPL-MCNC: 9 MG/DL (ref 8.5–10.1)
CHLORIDE SERPL-SCNC: 106 MMOL/L (ref 97–108)
CHOLEST SERPL-MCNC: 156 MG/DL
CO2 SERPL-SCNC: 28 MMOL/L (ref 21–32)
COCAINE, URINE, POC: NEGATIVE
CREAT SERPL-MCNC: 1.64 MG/DL (ref 0.7–1.3)
ERYTHROCYTE [DISTWIDTH] IN BLOOD BY AUTOMATED COUNT: 15.5 % (ref 11.5–14.5)
GLOBULIN SER CALC-MCNC: 3.4 G/DL (ref 2–4)
GLUCOSE SERPL-MCNC: 115 MG/DL (ref 65–100)
HCT VFR BLD AUTO: 45.6 % (ref 36.6–50.3)
HDLC SERPL-MCNC: 66 MG/DL
HDLC SERPL: 2.4 (ref 0–5)
HGB BLD-MCNC: 15.4 G/DL (ref 12.1–17)
LDLC SERPL CALC-MCNC: 47.8 MG/DL (ref 0–100)
LOT EXP DATE, POC: NORMAL
Lab: NORMAL
MARIJUANA (THC), URINE, POC: NEGATIVE
MCH RBC QN AUTO: 34.3 PG (ref 26–34)
MCHC RBC AUTO-ENTMCNC: 33.8 G/DL (ref 30–36.5)
MCV RBC AUTO: 101.6 FL (ref 80–99)
MDMA/ECSTASY, URINE, POC: NEGATIVE
METHADONE, URINE, POC: NEGATIVE
METHAMPHETAMINE, URINE, POC: NEGATIVE
NRBC # BLD: 0 K/UL (ref 0–0.01)
NRBC BLD-RTO: 0 PER 100 WBC
OPIATES 300, URINE, POC: NORMAL
OXYCODONE, URINE, POC: NORMAL
PHENCYCLIDINE, URINE, POC: NEGATIVE
PLATELET # BLD AUTO: 278 K/UL (ref 150–400)
PMV BLD AUTO: 9.9 FL (ref 8.9–12.9)
POTASSIUM SERPL-SCNC: 3.6 MMOL/L (ref 3.5–5.1)
PROT SERPL-MCNC: 7 G/DL (ref 6.4–8.2)
PSA SERPL-MCNC: 1.1 NG/ML (ref 0.01–4)
RBC # BLD AUTO: 4.49 M/UL (ref 4.1–5.7)
SODIUM SERPL-SCNC: 140 MMOL/L (ref 136–145)
TRICYCLIC ANTIDEPRESSANTS, URINE, POC: NEGATIVE
TRIGL SERPL-MCNC: 211 MG/DL
TSH SERPL DL<=0.05 MIU/L-ACNC: 3.27 UIU/ML (ref 0.36–3.74)
VLDLC SERPL CALC-MCNC: 42.2 MG/DL
WBC # BLD AUTO: 8.1 K/UL (ref 4.1–11.1)

## 2024-04-05 PROCEDURE — 99214 OFFICE O/P EST MOD 30 MIN: CPT | Performed by: INTERNAL MEDICINE

## 2024-04-05 PROCEDURE — 80305 DRUG TEST PRSMV DIR OPT OBS: CPT | Performed by: INTERNAL MEDICINE

## 2024-04-05 RX ORDER — OLMESARTAN MEDOXOMIL 40 MG/1
40 TABLET ORAL DAILY
Qty: 90 TABLET | Refills: 1 | Status: SHIPPED | OUTPATIENT
Start: 2024-04-05

## 2024-04-05 RX ORDER — DULOXETIN HYDROCHLORIDE 30 MG/1
30 CAPSULE, DELAYED RELEASE ORAL DAILY
Qty: 30 CAPSULE | Refills: 1 | Status: SHIPPED | OUTPATIENT
Start: 2024-04-05

## 2024-04-05 ASSESSMENT — PATIENT HEALTH QUESTIONNAIRE - PHQ9
SUM OF ALL RESPONSES TO PHQ9 QUESTIONS 1 & 2: 1
2. FEELING DOWN, DEPRESSED OR HOPELESS: SEVERAL DAYS
6. FEELING BAD ABOUT YOURSELF - OR THAT YOU ARE A FAILURE OR HAVE LET YOURSELF OR YOUR FAMILY DOWN: NOT AT ALL
4. FEELING TIRED OR HAVING LITTLE ENERGY: NOT AT ALL
SUM OF ALL RESPONSES TO PHQ QUESTIONS 1-9: 1
5. POOR APPETITE OR OVEREATING: NOT AT ALL
9. THOUGHTS THAT YOU WOULD BE BETTER OFF DEAD, OR OF HURTING YOURSELF: NOT AT ALL
7. TROUBLE CONCENTRATING ON THINGS, SUCH AS READING THE NEWSPAPER OR WATCHING TELEVISION: NOT AT ALL
SUM OF ALL RESPONSES TO PHQ QUESTIONS 1-9: 1
3. TROUBLE FALLING OR STAYING ASLEEP: NOT AT ALL
SUM OF ALL RESPONSES TO PHQ QUESTIONS 1-9: 1
10. IF YOU CHECKED OFF ANY PROBLEMS, HOW DIFFICULT HAVE THESE PROBLEMS MADE IT FOR YOU TO DO YOUR WORK, TAKE CARE OF THINGS AT HOME, OR GET ALONG WITH OTHER PEOPLE: NOT DIFFICULT AT ALL
1. LITTLE INTEREST OR PLEASURE IN DOING THINGS: NOT AT ALL
8. MOVING OR SPEAKING SO SLOWLY THAT OTHER PEOPLE COULD HAVE NOTICED. OR THE OPPOSITE, BEING SO FIGETY OR RESTLESS THAT YOU HAVE BEEN MOVING AROUND A LOT MORE THAN USUAL: NOT AT ALL
SUM OF ALL RESPONSES TO PHQ QUESTIONS 1-9: 1

## 2024-04-05 NOTE — PROGRESS NOTES
\"Have you been to the ER, urgent care clinic since your last visit?  Hospitalized since your last visit?\"    NO    “Have you seen or consulted any other health care providers outside of [facility] since your last visit?”    NO            Click Here for Release of Records Request    
fatigued  Eyes: Conjunctivae are normal.   Ears:  Hearing grossly intact  Cardiovascular: Normal rate. regular rhythm, no murmurs or gallops  No edema  Pulmonary/Chest: Effort normal.   CTAB  Musculoskeletal: moves all 4 extremities   Neurological: Alert and oriented to person, place, and time.   Skin: No visible rash noted.   Psychiatric: Normal mood and affect. Behavior is normal.     Assessment and Plan    Lorenzo was seen today for medication check, lab collection and hypertension.    Diagnoses and all orders for this visit:    Other fatigue  Unclear etiology.  Depression certainly is playing a role, but need to evaluate secondary causes.  Check renal function, TSH, CBC.  Has been tolerating high-dose narcotic therapy chronically.  Certainly this can start at as metabolism changes.  -     CBC; Future  -     TSH; Future    Pure hypercholesterolemia  On no current medication for cholesterol.  Check levels.  -     Lipid Panel; Future    Essential hypertension  Patient previously with significant uncontrolled hypertension requiring 3-4 drugs.  He says he is only taking olmesartan 40 mg daily and his blood pressure is actually little bit low.  Will consider decreasing olmesartan but monitor now.  Suspect dehydration.  -     CBC; Future  -     Comprehensive Metabolic Panel; Future  -     olmesartan (BENICAR) 40 MG tablet; Take 1 tablet by mouth daily    Major depressive disorder, recurrent, moderate (HCC)  Significant depression.  Situational.  Chronic pain.  He would benefit from medical therapy.  Has taken SSRIs in the past with variable results.  Could consider another, but given chronic pain issues, duloxetine would be rare option.  Start low-dose and titrate.  Side effects discussed.  -     DULoxetine (CYMBALTA) 30 MG extended release capsule; Take 1 capsule by mouth daily    Chronic pain  Narcotic dependence (HCC)  -     AMB POC USCREEN 12 DRUG -  Results of urine drug screen reviewed and are appropriate based

## 2024-04-08 ENCOUNTER — TELEPHONE (OUTPATIENT)
Age: 79
End: 2024-04-08

## 2024-04-08 NOTE — TELEPHONE ENCOUNTER
Spoke with patient and assisted with scheduling his follow up appt for 6/12/24 at 2:00 PM.  Grateful for the call.

## 2024-04-08 NOTE — TELEPHONE ENCOUNTER
Spoke with patient and updated on Dr. Stevens's comments, recommendations and repeat labs in 2-3 months. He states understanding and will decrease his Olmesartan and drink plenty of water as instructed. He asks if Dr. Stevens will put in the labs? Grateful for the call.

## 2024-04-08 NOTE — TELEPHONE ENCOUNTER
I will defer to see him in the office for a blood pressure check and we will check the labs at the visit.

## 2024-04-08 NOTE — TELEPHONE ENCOUNTER
----- Message from Boo Stevens MD sent at 4/7/2024  7:45 PM EDT -----  Sherice,   Please let patient know that his kidney function is a little bit abnormal, possibly because of low blood pressures.  He is taking olmesartan 40 mg daily.  Please advise that he decrease to 1/2 pill of olmesartan and drink plenty of water.  Repeat lab test in 2 to 3 months.

## 2024-04-25 ENCOUNTER — TELEPHONE (OUTPATIENT)
Age: 79
End: 2024-04-25

## 2024-04-29 DIAGNOSIS — F33.1 MAJOR DEPRESSIVE DISORDER, RECURRENT, MODERATE (HCC): ICD-10-CM

## 2024-04-29 RX ORDER — DULOXETIN HYDROCHLORIDE 30 MG/1
CAPSULE, DELAYED RELEASE ORAL DAILY
Qty: 90 CAPSULE | Refills: 1 | OUTPATIENT
Start: 2024-04-29

## 2024-05-03 DIAGNOSIS — G89.29 CHRONIC LOW BACK PAIN, UNSPECIFIED BACK PAIN LATERALITY, UNSPECIFIED WHETHER SCIATICA PRESENT: ICD-10-CM

## 2024-05-03 DIAGNOSIS — F11.20 OPIOID DEPENDENCE, UNCOMPLICATED (HCC): ICD-10-CM

## 2024-05-03 DIAGNOSIS — M79.605 PAIN IN LEFT LEG: ICD-10-CM

## 2024-05-03 DIAGNOSIS — M54.50 CHRONIC LOW BACK PAIN, UNSPECIFIED BACK PAIN LATERALITY, UNSPECIFIED WHETHER SCIATICA PRESENT: ICD-10-CM

## 2024-05-03 DIAGNOSIS — M79.604 PAIN IN RIGHT LEG: ICD-10-CM

## 2024-05-03 DIAGNOSIS — G89.4 CHRONIC PAIN SYNDROME: ICD-10-CM

## 2024-05-03 RX ORDER — OXYCODONE HYDROCHLORIDE 30 MG/1
30 TABLET ORAL EVERY 6 HOURS PRN
Qty: 120 TABLET | Refills: 0 | Status: SHIPPED | OUTPATIENT
Start: 2024-05-03 | End: 2024-06-02

## 2024-05-03 RX ORDER — OXYCODONE HCL 80 MG/1
80 TABLET, FILM COATED, EXTENDED RELEASE ORAL EVERY 12 HOURS
Qty: 60 EACH | Refills: 0 | Status: SHIPPED | OUTPATIENT
Start: 2024-05-03 | End: 2024-06-02

## 2024-05-03 RX ORDER — TRIAMCINOLONE ACETONIDE 1 MG/G
CREAM TOPICAL
Qty: 30 G | Refills: 3 | Status: SHIPPED | OUTPATIENT
Start: 2024-05-03

## 2024-05-03 NOTE — TELEPHONE ENCOUNTER
[facility]/pharmacy #[id number] - [place], [place] [address] - [place] [phone] - F [phone]        oxyCODONE (OXYCONTIN) 80 MG extended release tablet     oxyCODONE (OXY-IR) 30 MG immediate release tablet       Best contact information for patient   [phone]

## 2024-05-30 DIAGNOSIS — M79.604 PAIN IN RIGHT LEG: ICD-10-CM

## 2024-05-30 DIAGNOSIS — F11.20 OPIOID DEPENDENCE, UNCOMPLICATED (HCC): ICD-10-CM

## 2024-05-30 DIAGNOSIS — M79.605 PAIN IN LEFT LEG: ICD-10-CM

## 2024-05-30 DIAGNOSIS — M54.50 CHRONIC LOW BACK PAIN, UNSPECIFIED BACK PAIN LATERALITY, UNSPECIFIED WHETHER SCIATICA PRESENT: ICD-10-CM

## 2024-05-30 DIAGNOSIS — G89.29 CHRONIC LOW BACK PAIN, UNSPECIFIED BACK PAIN LATERALITY, UNSPECIFIED WHETHER SCIATICA PRESENT: ICD-10-CM

## 2024-05-30 DIAGNOSIS — G89.4 CHRONIC PAIN SYNDROME: ICD-10-CM

## 2024-05-30 RX ORDER — OXYCODONE HCL 80 MG/1
80 TABLET, FILM COATED, EXTENDED RELEASE ORAL EVERY 12 HOURS
Qty: 60 EACH | Refills: 0 | OUTPATIENT
Start: 2024-05-30 | End: 2024-06-29

## 2024-05-30 RX ORDER — OXYCODONE HYDROCHLORIDE 30 MG/1
30 TABLET ORAL EVERY 6 HOURS PRN
Qty: 120 TABLET | Refills: 0 | OUTPATIENT
Start: 2024-05-30 | End: 2024-06-29
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
## 2024-05-31 DIAGNOSIS — M79.605 PAIN IN LEFT LEG: ICD-10-CM

## 2024-05-31 DIAGNOSIS — G89.29 CHRONIC LOW BACK PAIN, UNSPECIFIED BACK PAIN LATERALITY, UNSPECIFIED WHETHER SCIATICA PRESENT: ICD-10-CM

## 2024-05-31 DIAGNOSIS — F11.20 OPIOID DEPENDENCE, UNCOMPLICATED (HCC): ICD-10-CM

## 2024-05-31 DIAGNOSIS — G89.4 CHRONIC PAIN SYNDROME: ICD-10-CM

## 2024-05-31 DIAGNOSIS — M79.604 PAIN IN RIGHT LEG: ICD-10-CM

## 2024-05-31 DIAGNOSIS — M54.50 CHRONIC LOW BACK PAIN, UNSPECIFIED BACK PAIN LATERALITY, UNSPECIFIED WHETHER SCIATICA PRESENT: ICD-10-CM

## 2024-05-31 RX ORDER — OXYCODONE HCL 80 MG/1
80 TABLET, FILM COATED, EXTENDED RELEASE ORAL EVERY 12 HOURS
Qty: 10 EACH | Refills: 0 | Status: SHIPPED | OUTPATIENT
Start: 2024-05-31 | End: 2024-06-05

## 2024-05-31 RX ORDER — OXYCODONE HYDROCHLORIDE 30 MG/1
30 TABLET ORAL EVERY 6 HOURS PRN
Qty: 20 TABLET | Refills: 0 | Status: SHIPPED | OUTPATIENT
Start: 2024-05-31 | End: 2024-06-05

## 2024-05-31 NOTE — TELEPHONE ENCOUNTER
Highly concerning story and he knows that he cannot have an early refill without further evaluation.  Can he come in today 11:40?  Please have them bring any records from ER.

## 2024-05-31 NOTE — TELEPHONE ENCOUNTER
His normally prescribed medications will run out this Sunday, June 2.  Since I have not heard back, I will go ahead and send in a 5-day prescription of both meds.  They can be filled today, if pharmacy allows.  If he is out of them already, he certainly will have severe withdrawal this weekend if he does not resume meds.  Please schedule appointment next week.

## 2024-06-10 ENCOUNTER — TELEPHONE (OUTPATIENT)
Age: 79
End: 2024-06-10

## 2024-06-10 NOTE — TELEPHONE ENCOUNTER
The patient is requesting a Rx refill on the medication listed below:  patient phone was disconnected before he name the medication an the pharmacy location.  PSR called back patient did not answer

## 2024-06-11 DIAGNOSIS — M79.605 PAIN IN LEFT LEG: ICD-10-CM

## 2024-06-11 DIAGNOSIS — G89.29 CHRONIC LOW BACK PAIN, UNSPECIFIED BACK PAIN LATERALITY, UNSPECIFIED WHETHER SCIATICA PRESENT: ICD-10-CM

## 2024-06-11 DIAGNOSIS — M79.604 PAIN IN RIGHT LEG: ICD-10-CM

## 2024-06-11 DIAGNOSIS — M48.02 SPINAL STENOSIS, CERVICAL REGION: ICD-10-CM

## 2024-06-11 DIAGNOSIS — G89.4 CHRONIC PAIN SYNDROME: Primary | ICD-10-CM

## 2024-06-11 DIAGNOSIS — F11.20 OPIOID DEPENDENCE, UNCOMPLICATED (HCC): ICD-10-CM

## 2024-06-11 DIAGNOSIS — M54.50 CHRONIC LOW BACK PAIN, UNSPECIFIED BACK PAIN LATERALITY, UNSPECIFIED WHETHER SCIATICA PRESENT: ICD-10-CM

## 2024-06-11 RX ORDER — OXYCODONE HYDROCHLORIDE 30 MG/1
30 TABLET ORAL EVERY 6 HOURS PRN
Qty: 12 TABLET | Refills: 0 | Status: SHIPPED | OUTPATIENT
Start: 2024-06-11 | End: 2024-06-13 | Stop reason: ALTCHOICE

## 2024-06-11 NOTE — TELEPHONE ENCOUNTER
RTC to  patient regarding a medication refill for his Oxy IR 30 mg. No answer and LVM. Dr. Stevens notified.

## 2024-06-11 NOTE — TELEPHONE ENCOUNTER
Patient requesting refill of oxyCODONE (OXY-IR) 30 MG immediate release tablet       Appoint on 06/13/2024 at 2:00pm       [facility]/pharmacy #[id number] - [place] - [address] [place] - P [phone] - F [phone]

## 2024-06-13 ENCOUNTER — OFFICE VISIT (OUTPATIENT)
Age: 79
End: 2024-06-13
Payer: MEDICARE

## 2024-06-13 VITALS
RESPIRATION RATE: 18 BRPM | SYSTOLIC BLOOD PRESSURE: 155 MMHG | HEIGHT: 66 IN | TEMPERATURE: 98.1 F | DIASTOLIC BLOOD PRESSURE: 90 MMHG | BODY MASS INDEX: 28.93 KG/M2 | OXYGEN SATURATION: 96 % | WEIGHT: 180 LBS | HEART RATE: 99 BPM

## 2024-06-13 DIAGNOSIS — N17.9 AKI (ACUTE KIDNEY INJURY) (HCC): ICD-10-CM

## 2024-06-13 DIAGNOSIS — M48.02 SPINAL STENOSIS, CERVICAL REGION: ICD-10-CM

## 2024-06-13 DIAGNOSIS — I10 ESSENTIAL HYPERTENSION: ICD-10-CM

## 2024-06-13 DIAGNOSIS — M54.50 CHRONIC LOW BACK PAIN, UNSPECIFIED BACK PAIN LATERALITY, UNSPECIFIED WHETHER SCIATICA PRESENT: ICD-10-CM

## 2024-06-13 DIAGNOSIS — G89.4 CHRONIC PAIN SYNDROME: ICD-10-CM

## 2024-06-13 DIAGNOSIS — F11.20 OPIOID DEPENDENCE, UNCOMPLICATED (HCC): ICD-10-CM

## 2024-06-13 DIAGNOSIS — G89.29 CHRONIC LOW BACK PAIN, UNSPECIFIED BACK PAIN LATERALITY, UNSPECIFIED WHETHER SCIATICA PRESENT: ICD-10-CM

## 2024-06-13 DIAGNOSIS — N17.9 AKI (ACUTE KIDNEY INJURY) (HCC): Primary | ICD-10-CM

## 2024-06-13 LAB
ALBUMIN SERPL-MCNC: 3.6 G/DL (ref 3.5–5)
ALBUMIN/GLOB SERPL: 1 (ref 1.1–2.2)
ALP SERPL-CCNC: 98 U/L (ref 45–117)
ALT SERPL-CCNC: 27 U/L (ref 12–78)
ANION GAP SERPL CALC-SCNC: 9 MMOL/L (ref 5–15)
AST SERPL-CCNC: 32 U/L (ref 15–37)
BILIRUB SERPL-MCNC: 0.6 MG/DL (ref 0.2–1)
BUN SERPL-MCNC: 20 MG/DL (ref 6–20)
BUN/CREAT SERPL: 16 (ref 12–20)
CALCIUM SERPL-MCNC: 9.7 MG/DL (ref 8.5–10.1)
CHLORIDE SERPL-SCNC: 105 MMOL/L (ref 97–108)
CO2 SERPL-SCNC: 26 MMOL/L (ref 21–32)
CREAT SERPL-MCNC: 1.28 MG/DL (ref 0.7–1.3)
GLOBULIN SER CALC-MCNC: 3.7 G/DL (ref 2–4)
GLUCOSE SERPL-MCNC: 84 MG/DL (ref 65–100)
POTASSIUM SERPL-SCNC: 4.6 MMOL/L (ref 3.5–5.1)
PROT SERPL-MCNC: 7.3 G/DL (ref 6.4–8.2)
SODIUM SERPL-SCNC: 140 MMOL/L (ref 136–145)

## 2024-06-13 PROCEDURE — 3077F SYST BP >= 140 MM HG: CPT | Performed by: INTERNAL MEDICINE

## 2024-06-13 PROCEDURE — 99214 OFFICE O/P EST MOD 30 MIN: CPT | Performed by: INTERNAL MEDICINE

## 2024-06-13 PROCEDURE — 3080F DIAST BP >= 90 MM HG: CPT | Performed by: INTERNAL MEDICINE

## 2024-06-13 PROCEDURE — 1123F ACP DISCUSS/DSCN MKR DOCD: CPT | Performed by: INTERNAL MEDICINE

## 2024-06-13 RX ORDER — OXYCODONE HYDROCHLORIDE 30 MG/1
30 TABLET ORAL
Qty: 72 TABLET | Refills: 0 | Status: SHIPPED | OUTPATIENT
Start: 2024-06-13 | End: 2024-07-01

## 2024-06-13 RX ORDER — OLMESARTAN MEDOXOMIL 40 MG/1
20 TABLET ORAL DAILY
Qty: 90 TABLET | Refills: 1
Start: 2024-06-13

## 2024-06-13 RX ORDER — OXYCODONE HCL 80 MG/1
80 TABLET, FILM COATED, EXTENDED RELEASE ORAL EVERY 12 HOURS
Qty: 60 EACH | Refills: 0
Start: 2024-07-01 | End: 2024-07-31

## 2024-06-13 SDOH — ECONOMIC STABILITY: FOOD INSECURITY: WITHIN THE PAST 12 MONTHS, THE FOOD YOU BOUGHT JUST DIDN'T LAST AND YOU DIDN'T HAVE MONEY TO GET MORE.: NEVER TRUE

## 2024-06-13 SDOH — ECONOMIC STABILITY: FOOD INSECURITY: WITHIN THE PAST 12 MONTHS, YOU WORRIED THAT YOUR FOOD WOULD RUN OUT BEFORE YOU GOT MONEY TO BUY MORE.: NEVER TRUE

## 2024-06-13 SDOH — ECONOMIC STABILITY: INCOME INSECURITY: HOW HARD IS IT FOR YOU TO PAY FOR THE VERY BASICS LIKE FOOD, HOUSING, MEDICAL CARE, AND HEATING?: NOT HARD AT ALL

## 2024-06-13 NOTE — PROGRESS NOTES
\"Have you been to the ER, urgent care clinic since your last visit?  Hospitalized since your last visit?\"    NO    “Have you seen or consulted any other health care providers outside of [facility] since your last visit?”    NO            Click Here for Release of Records Request    
in detail    Return to clinic for further evaluation if new symptoms develop      Current Outpatient Medications   Medication Sig    olmesartan (BENICAR) 40 MG tablet Take 0.5 tablets by mouth daily Decreased 4/8/24    oxyCODONE (OXY-IR) 30 MG immediate release tablet Take 1 tablet by mouth every 6 hours for 18 days. Max Daily Amount: 120 mg    [START ON 7/1/2024] oxyCODONE (OXYCONTIN) 80 MG extended release tablet Take 1 tablet by mouth in the morning and 1 tablet in the evening. Do all this for 30 days. Take 1 Tablet by mouth every twelve (12) hours for 30 days. Max Daily Amount: 160 mg.. Max Daily Amount: 160 mg.    triamcinolone (KENALOG) 0.1 % cream Apply topically 2 times daily.    DULoxetine (CYMBALTA) 30 MG extended release capsule Take 1 capsule by mouth daily    naloxone 4 MG/0.1ML LIQD nasal spray 1 spray by Nasal route as needed for Opioid Reversal    Colloidal Oatmeal 1 % CREA Use as recommended by derm     No current facility-administered medications for this visit.

## 2024-06-28 DIAGNOSIS — F11.20 OPIOID DEPENDENCE, UNCOMPLICATED (HCC): ICD-10-CM

## 2024-06-28 DIAGNOSIS — G89.4 CHRONIC PAIN SYNDROME: ICD-10-CM

## 2024-06-28 RX ORDER — OXYCODONE HYDROCHLORIDE 30 MG/1
30 TABLET ORAL
Qty: 120 TABLET | Refills: 0 | Status: SHIPPED | OUTPATIENT
Start: 2024-07-01 | End: 2024-07-31

## 2024-06-28 RX ORDER — OXYCODONE HCL 80 MG/1
80 TABLET, FILM COATED, EXTENDED RELEASE ORAL EVERY 12 HOURS
Qty: 60 EACH | Refills: 0 | Status: SHIPPED | OUTPATIENT
Start: 2024-07-01 | End: 2024-07-31

## 2024-06-28 NOTE — TELEPHONE ENCOUNTER
I have sent in both refills for 30 days each to the [facility] in [place] where I think is where he will fill it.  2 different pharmacies were listed on the call, but I think [facility] is likely correct.  Both medications are due for refill on July 1

## 2024-07-29 DIAGNOSIS — F11.20 OPIOID DEPENDENCE, UNCOMPLICATED (HCC): ICD-10-CM

## 2024-07-29 DIAGNOSIS — G89.4 CHRONIC PAIN SYNDROME: ICD-10-CM

## 2024-07-29 RX ORDER — OXYCODONE HYDROCHLORIDE 30 MG/1
30 TABLET ORAL
Qty: 120 TABLET | Refills: 0 | Status: SHIPPED | OUTPATIENT
Start: 2024-07-31 | End: 2024-08-30

## 2024-07-29 RX ORDER — OXYCODONE HCL 80 MG/1
80 TABLET, FILM COATED, EXTENDED RELEASE ORAL EVERY 12 HOURS
Qty: 60 EACH | Refills: 0 | Status: SHIPPED | OUTPATIENT
Start: 2024-07-31 | End: 2024-08-30

## 2024-07-29 NOTE — TELEPHONE ENCOUNTER
Patient refill request       oxyCODONE (OXYCONTIN) 80 MG extended release tablet   oxyCODONE (OXY-IR) 30 MG immediate release tablet      Patient will be out on Monday     [facility]/pharmacy #[id number] - [place] - [address] [place] - P [phone] - F [phone] [address] [address]  Phone: [phone]  Fax: [phone]

## 2024-08-29 DIAGNOSIS — G89.4 CHRONIC PAIN SYNDROME: ICD-10-CM

## 2024-08-29 DIAGNOSIS — F11.20 OPIOID DEPENDENCE, UNCOMPLICATED (HCC): ICD-10-CM

## 2024-08-29 RX ORDER — OXYCODONE HYDROCHLORIDE 30 MG/1
30 TABLET ORAL
Qty: 120 TABLET | Refills: 0 | Status: SHIPPED | OUTPATIENT
Start: 2024-08-29 | End: 2024-09-28

## 2024-08-29 RX ORDER — OXYCODONE HCL 80 MG/1
80 TABLET, FILM COATED, EXTENDED RELEASE ORAL EVERY 12 HOURS
Qty: 60 EACH | Refills: 0 | Status: SHIPPED | OUTPATIENT
Start: 2024-08-29 | End: 2024-09-28

## 2024-08-29 NOTE — TELEPHONE ENCOUNTER
Medication refill:    oxyCODONE (OXY-IR) 30 MG immediate release tablet   oxyCODONE (OXYCONTIN) 80 MG extended release tablet     [facility]/pharmacy #[id number] - [place], [place] - [address] - P [phone] - F [phone] [address] [address]  Phone: [phone]  Fax: [phone]

## 2024-09-25 DIAGNOSIS — G89.4 CHRONIC PAIN SYNDROME: ICD-10-CM

## 2024-09-25 DIAGNOSIS — F11.20 OPIOID DEPENDENCE, UNCOMPLICATED (HCC): ICD-10-CM

## 2024-09-25 RX ORDER — OXYCODONE HYDROCHLORIDE 30 MG/1
30 TABLET ORAL
Qty: 120 TABLET | Refills: 0 | Status: SHIPPED | OUTPATIENT
Start: 2024-09-28 | End: 2024-10-28

## 2024-09-25 RX ORDER — OXYCODONE HCL 80 MG/1
80 TABLET, FILM COATED, EXTENDED RELEASE ORAL EVERY 12 HOURS
Qty: 60 EACH | Refills: 0 | Status: SHIPPED | OUTPATIENT
Start: 2024-09-28 | End: 2024-10-28

## 2024-09-26 DIAGNOSIS — I10 ESSENTIAL HYPERTENSION: ICD-10-CM

## 2024-09-26 RX ORDER — OLMESARTAN MEDOXOMIL 40 MG/1
40 TABLET ORAL DAILY
Qty: 90 TABLET | Refills: 1 | Status: SHIPPED | OUTPATIENT
Start: 2024-09-26

## 2024-09-27 ENCOUNTER — TELEPHONE (OUTPATIENT)
Age: 79
End: 2024-09-27

## 2024-10-25 ENCOUNTER — TELEPHONE (OUTPATIENT)
Age: 79
End: 2024-10-25

## 2024-10-25 NOTE — TELEPHONE ENCOUNTER
The patient is requesting a Rx refill on the medication listed below:   oxyCODONE (OXYCONTIN) 80 MG extended release tablet   oxyCODONE (OXY-IR) 30 MG immediate release tablet   [facility]/pharmacy #[id number] - [place], [place] - [address] - P [phone] - F [phone]

## 2024-10-26 DIAGNOSIS — F11.20 OPIOID DEPENDENCE, UNCOMPLICATED (HCC): ICD-10-CM

## 2024-10-26 DIAGNOSIS — G89.4 CHRONIC PAIN SYNDROME: ICD-10-CM

## 2024-10-26 RX ORDER — OXYCODONE HYDROCHLORIDE 30 MG/1
30 TABLET ORAL
Qty: 120 TABLET | Refills: 0 | Status: SHIPPED | OUTPATIENT
Start: 2024-10-28 | End: 2024-11-27

## 2024-10-26 RX ORDER — OXYCODONE HCL 80 MG/1
80 TABLET, FILM COATED, EXTENDED RELEASE ORAL EVERY 12 HOURS
Qty: 60 EACH | Refills: 0 | Status: SHIPPED | OUTPATIENT
Start: 2024-10-28 | End: 2024-11-27

## 2024-11-14 ENCOUNTER — TELEPHONE (OUTPATIENT)
Age: 79
End: 2024-11-14

## 2024-11-14 NOTE — TELEPHONE ENCOUNTER
Spoke with Alicia/Alfredo and advised that she is not authorized to share information or talk with her about patient medical condition. She voiced concerns and listened to her and advised to have her father reach out to Dr. Stevens if he is not feeling well or if this is and emergency situation to call 911 if she feels his life is in danger. She states understanding and grateful for the call. Dr. Stevens notified.

## 2024-11-14 NOTE — TELEPHONE ENCOUNTER
Thank you.  There have been no recent medication changes and I have sent in his refills, so he should not be out anything.    He has been demonstrating increased paranoid behaviors.  History of PTSD.  He did not show for his appointment on 11/11.  Will require him to contact the office for further refills, ideally I will see him prior to further refills which I do near the end of this month.  Family should call police for a welfare check if there is any concern about his safety.

## 2024-11-14 NOTE — TELEPHONE ENCOUNTER
Alicia patient's daughter called stating the patient woke up this morning very angry, yelling, cussing out of the blue, she is concerned, she doesn't know if he was really that angry or was this a medication reaction    Call Alicia [phone] or call Alex [phone]

## 2024-11-22 DIAGNOSIS — G89.4 CHRONIC PAIN SYNDROME: ICD-10-CM

## 2024-11-22 DIAGNOSIS — F11.20 OPIOID DEPENDENCE, UNCOMPLICATED (HCC): ICD-10-CM

## 2024-11-22 RX ORDER — OXYCODONE HCL 80 MG/1
80 TABLET, FILM COATED, EXTENDED RELEASE ORAL EVERY 12 HOURS
Qty: 60 EACH | Refills: 0 | Status: SHIPPED | OUTPATIENT
Start: 2024-11-26 | End: 2024-11-22 | Stop reason: SDUPTHER

## 2024-11-22 RX ORDER — OXYCODONE HYDROCHLORIDE 30 MG/1
30 TABLET ORAL
Qty: 120 TABLET | Refills: 0 | Status: SHIPPED | OUTPATIENT
Start: 2024-11-26 | End: 2024-12-26

## 2024-11-22 RX ORDER — OXYCODONE HCL 80 MG/1
80 TABLET, FILM COATED, EXTENDED RELEASE ORAL EVERY 12 HOURS
Qty: 60 EACH | Refills: 0 | Status: SHIPPED | OUTPATIENT
Start: 2024-11-26 | End: 2024-12-26

## 2024-11-22 NOTE — TELEPHONE ENCOUNTER
Last refills were filled on October 28, so her next refills are due next Wednesday, November 27.  I am concerned about the phone call that was received from his daughter stating that he was agitated and confused.  Please call patient to check on him and please schedule an appointment for him to see me within the next 3 weeks.  I can send a refill to be sent in prior to his appointment, as long as recheck on him first

## 2024-11-22 NOTE — TELEPHONE ENCOUNTER
T/C to patient to assist with scheduling an appointment with Dr. Stevens with in the next 3 weeks. No answer and LVM.  [employer] message sent.

## 2024-11-22 NOTE — TELEPHONE ENCOUNTER
Spoke with patient and updated on Dr. Stevens's request for a follow up appt for his medication. He states understanding and schedule for 12/9/24 at 1:00 PM.  Grateful for the call.

## 2024-11-22 NOTE — TELEPHONE ENCOUNTER
The patient is requesting a Rx refill on the medication listed below:   oxyCODONE (OXY-IR) 30 MG immediate release tablet   oxyCODONE (OXYCONTIN) 80 MG extended release tablet   [facility]/pharmacy #[id number] - [place], [place] - [address] - P [phone] - F [phone]

## 2024-11-28 ENCOUNTER — HOSPITAL ENCOUNTER (INPATIENT)
Facility: HOSPITAL | Age: 79
LOS: 4 days | Discharge: HOME HEALTH | DRG: 439 | End: 2024-12-03
Attending: EMERGENCY MEDICINE | Admitting: FAMILY MEDICINE
Payer: MEDICARE

## 2024-11-28 DIAGNOSIS — K85.00 IDIOPATHIC ACUTE PANCREATITIS WITHOUT INFECTION OR NECROSIS: Primary | ICD-10-CM

## 2024-11-28 DIAGNOSIS — E87.6 HYPOKALEMIA: ICD-10-CM

## 2024-11-28 PROCEDURE — 2580000003 HC RX 258: Performed by: EMERGENCY MEDICINE

## 2024-11-28 PROCEDURE — 96361 HYDRATE IV INFUSION ADD-ON: CPT

## 2024-11-28 PROCEDURE — 99285 EMERGENCY DEPT VISIT HI MDM: CPT

## 2024-11-28 PROCEDURE — 96374 THER/PROPH/DIAG INJ IV PUSH: CPT

## 2024-11-28 PROCEDURE — 85025 COMPLETE CBC W/AUTO DIFF WBC: CPT

## 2024-11-28 PROCEDURE — 96375 TX/PRO/DX INJ NEW DRUG ADDON: CPT

## 2024-11-28 PROCEDURE — 80053 COMPREHEN METABOLIC PANEL: CPT

## 2024-11-28 PROCEDURE — 6360000002 HC RX W HCPCS: Performed by: EMERGENCY MEDICINE

## 2024-11-28 PROCEDURE — 36415 COLL VENOUS BLD VENIPUNCTURE: CPT

## 2024-11-28 RX ORDER — 0.9 % SODIUM CHLORIDE 0.9 %
1000 INTRAVENOUS SOLUTION INTRAVENOUS ONCE
Status: COMPLETED | OUTPATIENT
Start: 2024-11-28 | End: 2024-11-29

## 2024-11-28 RX ORDER — KETOROLAC TROMETHAMINE 30 MG/ML
30 INJECTION, SOLUTION INTRAMUSCULAR; INTRAVENOUS ONCE
Status: COMPLETED | OUTPATIENT
Start: 2024-11-28 | End: 2024-11-28

## 2024-11-28 RX ORDER — ONDANSETRON 2 MG/ML
8 INJECTION INTRAMUSCULAR; INTRAVENOUS ONCE
Status: COMPLETED | OUTPATIENT
Start: 2024-11-28 | End: 2024-11-28

## 2024-11-28 RX ADMIN — ONDANSETRON 8 MG: 2 INJECTION, SOLUTION INTRAMUSCULAR; INTRAVENOUS at 23:37

## 2024-11-28 RX ADMIN — KETOROLAC TROMETHAMINE 30 MG: 30 INJECTION, SOLUTION INTRAMUSCULAR at 23:37

## 2024-11-28 RX ADMIN — SODIUM CHLORIDE 1000 ML: 9 INJECTION, SOLUTION INTRAVENOUS at 23:33

## 2024-11-28 ASSESSMENT — PAIN DESCRIPTION - ORIENTATION
ORIENTATION: MID;LOWER
ORIENTATION: MID

## 2024-11-28 ASSESSMENT — PAIN DESCRIPTION - LOCATION
LOCATION: ABDOMEN
LOCATION: ABDOMEN

## 2024-11-28 ASSESSMENT — PAIN DESCRIPTION - DESCRIPTORS
DESCRIPTORS: ACHING
DESCRIPTORS: ACHING

## 2024-11-28 ASSESSMENT — PAIN - FUNCTIONAL ASSESSMENT: PAIN_FUNCTIONAL_ASSESSMENT: 0-10

## 2024-11-28 ASSESSMENT — PAIN SCALES - GENERAL
PAINLEVEL_OUTOF10: 9
PAINLEVEL_OUTOF10: 9

## 2024-11-29 ENCOUNTER — APPOINTMENT (OUTPATIENT)
Facility: HOSPITAL | Age: 79
DRG: 439 | End: 2024-11-29
Payer: MEDICARE

## 2024-11-29 PROBLEM — K85.90 ACUTE PANCREATITIS WITHOUT INFECTION OR NECROSIS: Status: ACTIVE | Noted: 2024-11-29

## 2024-11-29 LAB
ALBUMIN SERPL-MCNC: 2.2 G/DL (ref 3.5–5)
ALBUMIN SERPL-MCNC: 2.8 G/DL (ref 3.5–5)
ALBUMIN/GLOB SERPL: 0.7 (ref 1.1–2.2)
ALP SERPL-CCNC: 89 U/L (ref 45–117)
ALT SERPL-CCNC: 22 U/L (ref 12–78)
AMYLASE SERPL-CCNC: 159 U/L (ref 25–115)
ANION GAP SERPL CALC-SCNC: 9 MMOL/L (ref 2–12)
ANION GAP SERPL CALC-SCNC: 9 MMOL/L (ref 2–12)
APPEARANCE UR: CLEAR
AST SERPL-CCNC: 22 U/L (ref 15–37)
BACTERIA URNS QL MICRO: NEGATIVE /HPF
BASOPHILS # BLD: 0 K/UL (ref 0–0.1)
BASOPHILS NFR BLD: 0 % (ref 0–1)
BILIRUB SERPL-MCNC: 1.3 MG/DL (ref 0.2–1)
BILIRUB UR QL: NEGATIVE
BUN SERPL-MCNC: 12 MG/DL (ref 6–20)
BUN SERPL-MCNC: 17 MG/DL (ref 6–20)
BUN/CREAT SERPL: 15 (ref 12–20)
BUN/CREAT SERPL: 9 (ref 12–20)
CALCIUM SERPL-MCNC: 7.6 MG/DL (ref 8.5–10.1)
CALCIUM SERPL-MCNC: 8.4 MG/DL (ref 8.5–10.1)
CHLORIDE SERPL-SCNC: 101 MMOL/L (ref 97–108)
CHLORIDE SERPL-SCNC: 106 MMOL/L (ref 97–108)
CO2 SERPL-SCNC: 23 MMOL/L (ref 21–32)
CO2 SERPL-SCNC: 26 MMOL/L (ref 21–32)
COLOR UR: ABNORMAL
CREAT SERPL-MCNC: 1.13 MG/DL (ref 0.7–1.3)
CREAT SERPL-MCNC: 1.35 MG/DL (ref 0.7–1.3)
CRP SERPL-MCNC: 22.8 MG/DL (ref 0–0.3)
DIFFERENTIAL METHOD BLD: ABNORMAL
EOSINOPHIL # BLD: 0.2 K/UL (ref 0–0.4)
EOSINOPHIL NFR BLD: 1 % (ref 0–7)
EPITH CASTS URNS QL MICRO: ABNORMAL /LPF
ERYTHROCYTE [DISTWIDTH] IN BLOOD BY AUTOMATED COUNT: 15.1 % (ref 11.5–14.5)
ERYTHROCYTE [DISTWIDTH] IN BLOOD BY AUTOMATED COUNT: 15.7 % (ref 11.5–14.5)
ETHANOL SERPL-MCNC: <10 MG/DL (ref 0–0.08)
GLOBULIN SER CALC-MCNC: 3.9 G/DL (ref 2–4)
GLUCOSE SERPL-MCNC: 115 MG/DL (ref 65–100)
GLUCOSE SERPL-MCNC: 74 MG/DL (ref 65–100)
GLUCOSE UR STRIP.AUTO-MCNC: NEGATIVE MG/DL
HCT VFR BLD AUTO: 41.5 % (ref 36.6–50.3)
HCT VFR BLD AUTO: 47.6 % (ref 36.6–50.3)
HGB BLD-MCNC: 14 G/DL (ref 12.1–17)
HGB BLD-MCNC: 16.3 G/DL (ref 12.1–17)
HGB UR QL STRIP: NEGATIVE
HYALINE CASTS URNS QL MICRO: ABNORMAL /LPF (ref 0–2)
IMM GRANULOCYTES # BLD AUTO: 0.2 K/UL (ref 0–0.04)
IMM GRANULOCYTES NFR BLD AUTO: 1 % (ref 0–0.5)
KETONES UR QL STRIP.AUTO: NEGATIVE MG/DL
LACTATE BLD-SCNC: 2.03 MMOL/L (ref 0.4–2)
LACTATE SERPL-SCNC: 1.9 MMOL/L (ref 0.4–2)
LEUKOCYTE ESTERASE UR QL STRIP.AUTO: NEGATIVE
LIPASE SERPL-CCNC: 210 U/L (ref 13–75)
LYMPHOCYTES # BLD: 0.9 K/UL (ref 0.8–3.5)
LYMPHOCYTES NFR BLD: 4 % (ref 12–49)
MAGNESIUM SERPL-MCNC: 1.9 MG/DL (ref 1.6–2.4)
MCH RBC QN AUTO: 34.1 PG (ref 26–34)
MCH RBC QN AUTO: 34.2 PG (ref 26–34)
MCHC RBC AUTO-ENTMCNC: 33.7 G/DL (ref 30–36.5)
MCHC RBC AUTO-ENTMCNC: 34.2 G/DL (ref 30–36.5)
MCV RBC AUTO: 100 FL (ref 80–99)
MCV RBC AUTO: 101 FL (ref 80–99)
MONOCYTES # BLD: 1.1 K/UL (ref 0–1)
MONOCYTES NFR BLD: 5 % (ref 5–13)
NEUTS SEG # BLD: 19 K/UL (ref 1.8–8)
NEUTS SEG NFR BLD: 89 % (ref 32–75)
NITRITE UR QL STRIP.AUTO: NEGATIVE
NRBC # BLD: 0 K/UL (ref 0–0.01)
NRBC # BLD: 0.05 K/UL (ref 0–0.01)
NRBC BLD-RTO: 0 PER 100 WBC
NRBC BLD-RTO: 0.2 PER 100 WBC
PH UR STRIP: 7.5 (ref 5–8)
PHOSPHATE SERPL-MCNC: 3.3 MG/DL (ref 2.6–4.7)
PLATELET # BLD AUTO: 213 K/UL (ref 150–400)
PLATELET # BLD AUTO: 286 K/UL (ref 150–400)
PMV BLD AUTO: 10.1 FL (ref 8.9–12.9)
PMV BLD AUTO: 9.4 FL (ref 8.9–12.9)
POTASSIUM SERPL-SCNC: 2.9 MMOL/L (ref 3.5–5.1)
POTASSIUM SERPL-SCNC: 3.4 MMOL/L (ref 3.5–5.1)
PROCALCITONIN SERPL-MCNC: 1.56 NG/ML
PROT SERPL-MCNC: 6.7 G/DL (ref 6.4–8.2)
PROT UR STRIP-MCNC: 30 MG/DL
RBC # BLD AUTO: 4.11 M/UL (ref 4.1–5.7)
RBC # BLD AUTO: 4.76 M/UL (ref 4.1–5.7)
RBC #/AREA URNS HPF: ABNORMAL /HPF (ref 0–5)
RBC MORPH BLD: ABNORMAL
SODIUM SERPL-SCNC: 136 MMOL/L (ref 136–145)
SODIUM SERPL-SCNC: 138 MMOL/L (ref 136–145)
SP GR UR REFRACTOMETRY: 1.02 (ref 1–1.03)
URINE CULTURE IF INDICATED: ABNORMAL
UROBILINOGEN UR QL STRIP.AUTO: 0.2 EU/DL (ref 0.2–1)
WBC # BLD AUTO: 21.4 K/UL (ref 4.1–11.1)
WBC # BLD AUTO: 21.4 K/UL (ref 4.1–11.1)
WBC URNS QL MICRO: ABNORMAL /HPF (ref 0–4)

## 2024-11-29 PROCEDURE — 36415 COLL VENOUS BLD VENIPUNCTURE: CPT

## 2024-11-29 PROCEDURE — 84100 ASSAY OF PHOSPHORUS: CPT

## 2024-11-29 PROCEDURE — 81001 URINALYSIS AUTO W/SCOPE: CPT

## 2024-11-29 PROCEDURE — 1100000000 HC RM PRIVATE

## 2024-11-29 PROCEDURE — 96361 HYDRATE IV INFUSION ADD-ON: CPT

## 2024-11-29 PROCEDURE — 6370000000 HC RX 637 (ALT 250 FOR IP): Performed by: FAMILY MEDICINE

## 2024-11-29 PROCEDURE — 6360000002 HC RX W HCPCS: Performed by: STUDENT IN AN ORGANIZED HEALTH CARE EDUCATION/TRAINING PROGRAM

## 2024-11-29 PROCEDURE — 2500000003 HC RX 250 WO HCPCS: Performed by: FAMILY MEDICINE

## 2024-11-29 PROCEDURE — 6360000002 HC RX W HCPCS: Performed by: EMERGENCY MEDICINE

## 2024-11-29 PROCEDURE — 84145 PROCALCITONIN (PCT): CPT

## 2024-11-29 PROCEDURE — 6360000004 HC RX CONTRAST MEDICATION: Performed by: RADIOLOGY

## 2024-11-29 PROCEDURE — 2580000003 HC RX 258: Performed by: EMERGENCY MEDICINE

## 2024-11-29 PROCEDURE — 74181 MRI ABDOMEN W/O CONTRAST: CPT

## 2024-11-29 PROCEDURE — 94761 N-INVAS EAR/PLS OXIMETRY MLT: CPT

## 2024-11-29 PROCEDURE — 2580000003 HC RX 258: Performed by: FAMILY MEDICINE

## 2024-11-29 PROCEDURE — 6360000002 HC RX W HCPCS: Performed by: FAMILY MEDICINE

## 2024-11-29 PROCEDURE — 87040 BLOOD CULTURE FOR BACTERIA: CPT

## 2024-11-29 PROCEDURE — 83690 ASSAY OF LIPASE: CPT

## 2024-11-29 PROCEDURE — 82077 ASSAY SPEC XCP UR&BREATH IA: CPT

## 2024-11-29 PROCEDURE — 83605 ASSAY OF LACTIC ACID: CPT

## 2024-11-29 PROCEDURE — 82040 ASSAY OF SERUM ALBUMIN: CPT

## 2024-11-29 PROCEDURE — 83735 ASSAY OF MAGNESIUM: CPT

## 2024-11-29 PROCEDURE — 74177 CT ABD & PELVIS W/CONTRAST: CPT

## 2024-11-29 PROCEDURE — 80048 BASIC METABOLIC PNL TOTAL CA: CPT

## 2024-11-29 PROCEDURE — 85027 COMPLETE CBC AUTOMATED: CPT

## 2024-11-29 PROCEDURE — 76705 ECHO EXAM OF ABDOMEN: CPT

## 2024-11-29 PROCEDURE — 71045 X-RAY EXAM CHEST 1 VIEW: CPT

## 2024-11-29 PROCEDURE — 6370000000 HC RX 637 (ALT 250 FOR IP): Performed by: EMERGENCY MEDICINE

## 2024-11-29 PROCEDURE — 6370000000 HC RX 637 (ALT 250 FOR IP): Performed by: STUDENT IN AN ORGANIZED HEALTH CARE EDUCATION/TRAINING PROGRAM

## 2024-11-29 PROCEDURE — 86140 C-REACTIVE PROTEIN: CPT

## 2024-11-29 PROCEDURE — 82150 ASSAY OF AMYLASE: CPT

## 2024-11-29 RX ORDER — LORAZEPAM 2 MG/ML
0.5 INJECTION INTRAMUSCULAR ONCE
Status: COMPLETED | OUTPATIENT
Start: 2024-11-29 | End: 2024-11-29

## 2024-11-29 RX ORDER — TRAMADOL HYDROCHLORIDE 50 MG/1
50 TABLET ORAL EVERY 6 HOURS PRN
Status: DISCONTINUED | OUTPATIENT
Start: 2024-11-29 | End: 2024-11-29

## 2024-11-29 RX ORDER — SODIUM CHLORIDE 9 MG/ML
INJECTION, SOLUTION INTRAVENOUS PRN
Status: DISCONTINUED | OUTPATIENT
Start: 2024-11-29 | End: 2024-12-03 | Stop reason: HOSPADM

## 2024-11-29 RX ORDER — POTASSIUM CHLORIDE 750 MG/1
40 TABLET, EXTENDED RELEASE ORAL PRN
Status: DISCONTINUED | OUTPATIENT
Start: 2024-11-29 | End: 2024-12-03 | Stop reason: HOSPADM

## 2024-11-29 RX ORDER — PROCHLORPERAZINE EDISYLATE 5 MG/ML
10 INJECTION INTRAMUSCULAR; INTRAVENOUS EVERY 6 HOURS PRN
Status: DISCONTINUED | OUTPATIENT
Start: 2024-11-29 | End: 2024-12-03 | Stop reason: HOSPADM

## 2024-11-29 RX ORDER — POTASSIUM CHLORIDE 7.45 MG/ML
10 INJECTION INTRAVENOUS ONCE
Status: COMPLETED | OUTPATIENT
Start: 2024-11-29 | End: 2024-11-29

## 2024-11-29 RX ORDER — ACETAMINOPHEN 650 MG/1
650 SUPPOSITORY RECTAL EVERY 6 HOURS PRN
Status: DISCONTINUED | OUTPATIENT
Start: 2024-11-29 | End: 2024-12-03 | Stop reason: HOSPADM

## 2024-11-29 RX ORDER — LOSARTAN POTASSIUM 50 MG/1
100 TABLET ORAL DAILY
Status: DISCONTINUED | OUTPATIENT
Start: 2024-11-29 | End: 2024-12-03 | Stop reason: HOSPADM

## 2024-11-29 RX ORDER — HYDROMORPHONE HYDROCHLORIDE 1 MG/ML
1 INJECTION, SOLUTION INTRAMUSCULAR; INTRAVENOUS; SUBCUTANEOUS
Status: DISCONTINUED | OUTPATIENT
Start: 2024-11-29 | End: 2024-11-29

## 2024-11-29 RX ORDER — ACETAMINOPHEN 325 MG/1
650 TABLET ORAL EVERY 6 HOURS PRN
Status: DISCONTINUED | OUTPATIENT
Start: 2024-11-29 | End: 2024-12-03 | Stop reason: HOSPADM

## 2024-11-29 RX ORDER — HYDROMORPHONE HYDROCHLORIDE 1 MG/ML
0.5 INJECTION, SOLUTION INTRAMUSCULAR; INTRAVENOUS; SUBCUTANEOUS
Status: DISCONTINUED | OUTPATIENT
Start: 2024-11-29 | End: 2024-11-29

## 2024-11-29 RX ORDER — KETOROLAC TROMETHAMINE 15 MG/ML
15 INJECTION, SOLUTION INTRAMUSCULAR; INTRAVENOUS EVERY 6 HOURS
Status: DISCONTINUED | OUTPATIENT
Start: 2024-11-29 | End: 2024-11-29

## 2024-11-29 RX ORDER — IOPAMIDOL 755 MG/ML
100 INJECTION, SOLUTION INTRAVASCULAR
Status: COMPLETED | OUTPATIENT
Start: 2024-11-29 | End: 2024-11-29

## 2024-11-29 RX ORDER — MORPHINE SULFATE 2 MG/ML
2 INJECTION, SOLUTION INTRAMUSCULAR; INTRAVENOUS
Status: DISCONTINUED | OUTPATIENT
Start: 2024-11-29 | End: 2024-12-01

## 2024-11-29 RX ORDER — MAGNESIUM SULFATE IN WATER 40 MG/ML
2000 INJECTION, SOLUTION INTRAVENOUS PRN
Status: DISCONTINUED | OUTPATIENT
Start: 2024-11-29 | End: 2024-12-03 | Stop reason: HOSPADM

## 2024-11-29 RX ORDER — HYDRALAZINE HYDROCHLORIDE 20 MG/ML
5 INJECTION INTRAMUSCULAR; INTRAVENOUS EVERY 6 HOURS PRN
Status: DISCONTINUED | OUTPATIENT
Start: 2024-11-29 | End: 2024-12-03 | Stop reason: HOSPADM

## 2024-11-29 RX ORDER — SODIUM CHLORIDE 0.9 % (FLUSH) 0.9 %
5-40 SYRINGE (ML) INJECTION PRN
Status: DISCONTINUED | OUTPATIENT
Start: 2024-11-29 | End: 2024-12-03 | Stop reason: HOSPADM

## 2024-11-29 RX ORDER — SODIUM CHLORIDE 0.9 % (FLUSH) 0.9 %
5-40 SYRINGE (ML) INJECTION EVERY 12 HOURS SCHEDULED
Status: DISCONTINUED | OUTPATIENT
Start: 2024-11-29 | End: 2024-12-03 | Stop reason: HOSPADM

## 2024-11-29 RX ORDER — SODIUM CHLORIDE 9 MG/ML
INJECTION, SOLUTION INTRAVENOUS CONTINUOUS
Status: DISPENSED | OUTPATIENT
Start: 2024-11-29 | End: 2024-11-29

## 2024-11-29 RX ORDER — AMLODIPINE BESYLATE 5 MG/1
5 TABLET ORAL DAILY
Status: DISCONTINUED | OUTPATIENT
Start: 2024-11-29 | End: 2024-12-03 | Stop reason: HOSPADM

## 2024-11-29 RX ORDER — POTASSIUM CHLORIDE 7.45 MG/ML
10 INJECTION INTRAVENOUS PRN
Status: DISCONTINUED | OUTPATIENT
Start: 2024-11-29 | End: 2024-12-03 | Stop reason: HOSPADM

## 2024-11-29 RX ORDER — MORPHINE SULFATE 4 MG/ML
4 INJECTION, SOLUTION INTRAMUSCULAR; INTRAVENOUS
Status: DISCONTINUED | OUTPATIENT
Start: 2024-11-29 | End: 2024-12-01

## 2024-11-29 RX ORDER — HYDROMORPHONE HYDROCHLORIDE 1 MG/ML
0.25 INJECTION, SOLUTION INTRAMUSCULAR; INTRAVENOUS; SUBCUTANEOUS
Status: DISCONTINUED | OUTPATIENT
Start: 2024-11-29 | End: 2024-11-29

## 2024-11-29 RX ADMIN — KETOROLAC TROMETHAMINE 15 MG: 15 INJECTION, SOLUTION INTRAMUSCULAR; INTRAVENOUS at 04:32

## 2024-11-29 RX ADMIN — SODIUM CHLORIDE: 9 INJECTION, SOLUTION INTRAVENOUS at 05:12

## 2024-11-29 RX ADMIN — PIPERACILLIN AND TAZOBACTAM 4500 MG: 4; .5 INJECTION, POWDER, FOR SOLUTION INTRAVENOUS at 04:03

## 2024-11-29 RX ADMIN — MORPHINE SULFATE 4 MG: 4 INJECTION, SOLUTION INTRAMUSCULAR; INTRAVENOUS at 11:57

## 2024-11-29 RX ADMIN — MORPHINE SULFATE 4 MG: 4 INJECTION, SOLUTION INTRAMUSCULAR; INTRAVENOUS at 16:55

## 2024-11-29 RX ADMIN — MORPHINE SULFATE 4 MG: 4 INJECTION, SOLUTION INTRAMUSCULAR; INTRAVENOUS at 23:30

## 2024-11-29 RX ADMIN — TRAMADOL HYDROCHLORIDE 50 MG: 50 TABLET ORAL at 08:21

## 2024-11-29 RX ADMIN — AMLODIPINE BESYLATE 5 MG: 5 TABLET ORAL at 12:24

## 2024-11-29 RX ADMIN — MORPHINE SULFATE 4 MG: 4 INJECTION, SOLUTION INTRAMUSCULAR; INTRAVENOUS at 14:45

## 2024-11-29 RX ADMIN — HYDRALAZINE HYDROCHLORIDE 5 MG: 20 INJECTION INTRAMUSCULAR; INTRAVENOUS at 20:03

## 2024-11-29 RX ADMIN — HYDROMORPHONE HYDROCHLORIDE 0.5 MG: 1 INJECTION, SOLUTION INTRAMUSCULAR; INTRAVENOUS; SUBCUTANEOUS at 06:11

## 2024-11-29 RX ADMIN — Medication 6 MG: at 23:35

## 2024-11-29 RX ADMIN — MORPHINE SULFATE 4 MG: 4 INJECTION, SOLUTION INTRAMUSCULAR; INTRAVENOUS at 19:21

## 2024-11-29 RX ADMIN — KETOROLAC TROMETHAMINE 15 MG: 15 INJECTION, SOLUTION INTRAMUSCULAR; INTRAVENOUS at 11:34

## 2024-11-29 RX ADMIN — PANTOPRAZOLE SODIUM 40 MG: 40 INJECTION, POWDER, FOR SOLUTION INTRAVENOUS at 08:18

## 2024-11-29 RX ADMIN — HYDRALAZINE HYDROCHLORIDE 5 MG: 20 INJECTION INTRAMUSCULAR; INTRAVENOUS at 12:48

## 2024-11-29 RX ADMIN — LORAZEPAM 0.5 MG: 2 INJECTION INTRAMUSCULAR; INTRAVENOUS at 09:26

## 2024-11-29 RX ADMIN — POTASSIUM BICARBONATE 40 MEQ: 782 TABLET, EFFERVESCENT ORAL at 03:21

## 2024-11-29 RX ADMIN — POTASSIUM CHLORIDE 10 MEQ: 7.46 INJECTION, SOLUTION INTRAVENOUS at 04:01

## 2024-11-29 RX ADMIN — SODIUM CHLORIDE, PRESERVATIVE FREE 10 ML: 5 INJECTION INTRAVENOUS at 20:03

## 2024-11-29 RX ADMIN — LOSARTAN POTASSIUM 100 MG: 50 TABLET, FILM COATED ORAL at 15:49

## 2024-11-29 RX ADMIN — IOPAMIDOL 100 ML: 755 INJECTION, SOLUTION INTRAVENOUS at 01:05

## 2024-11-29 ASSESSMENT — PAIN SCALES - GENERAL
PAINLEVEL_OUTOF10: 9
PAINLEVEL_OUTOF10: 7
PAINLEVEL_OUTOF10: 8
PAINLEVEL_OUTOF10: 8
PAINLEVEL_OUTOF10: 9
PAINLEVEL_OUTOF10: 6
PAINLEVEL_OUTOF10: 4
PAINLEVEL_OUTOF10: 9
PAINLEVEL_OUTOF10: 7
PAINLEVEL_OUTOF10: 7

## 2024-11-29 ASSESSMENT — PAIN DESCRIPTION - DESCRIPTORS
DESCRIPTORS: PATIENT UNABLE TO DESCRIBE
DESCRIPTORS: SHARP;STABBING
DESCRIPTORS: STABBING;SHARP
DESCRIPTORS: PATIENT UNABLE TO DESCRIBE
DESCRIPTORS: PATIENT UNABLE TO DESCRIBE
DESCRIPTORS: SHARP
DESCRIPTORS: DULL;DISCOMFORT
DESCRIPTORS: SHARP;STABBING

## 2024-11-29 ASSESSMENT — PAIN - FUNCTIONAL ASSESSMENT
PAIN_FUNCTIONAL_ASSESSMENT: PREVENTS OR INTERFERES SOME ACTIVE ACTIVITIES AND ADLS

## 2024-11-29 ASSESSMENT — PAIN DESCRIPTION - ORIENTATION
ORIENTATION: LOWER;MID
ORIENTATION: MID;LOWER
ORIENTATION: LOWER;MID
ORIENTATION: MID;INNER

## 2024-11-29 ASSESSMENT — PAIN DESCRIPTION - LOCATION
LOCATION: ABDOMEN

## 2024-11-29 NOTE — ACP (ADVANCE CARE PLANNING)
Advanced Care Planning    Explanation of the purpose of Advance Care Planning has been discussed with Patient Lorenzo Johnson in person today.      The patient was receptive to the discussion which focused largely on patient centric decision making.      Chronic conditions impacting prognosis and decision making include: PTSD, chronic back pain, chronic lumbar pain, psoriasis.    We discussed the emergency management of respiratory and cardiac arrest in the hospital and the patient prefers a code status of full code at this time.    Patient states he does not have a living will/advanced directive  at this time.  Has appointed his daughter to speak on his behalf should he be unable to do so.  Patient is okay with advanced therapies and  invasive procedures,  such as central lines, A-line's, parenteral feedings, etc., should they be needed except for blood transfusions and a feeding tube.    Time spent today in ACP is 16 minutes which is exclusive of other services provided.    Electronically signed by:  Eleanor Gamez DO

## 2024-11-29 NOTE — ED TRIAGE NOTES
Pt arrives to ED via POV in wheelchair c/o mid lower abd pain x 4 days. Pt denies n/v, diarrhea, constipation, CP, SOB, urinary complaints.

## 2024-11-29 NOTE — ED PROVIDER NOTES
Notable for the following components:       Result Value    WBC 21.4 (*)     .0 (*)     MCH 34.2 (*)     RDW 15.1 (*)     Nucleated RBCs 0.2 (*)     nRBC 0.05 (*)     Neutrophils % 89 (*)     Lymphocytes % 4 (*)     Immature Granulocytes % 1 (*)     Neutrophils Absolute 19.0 (*)     Monocytes Absolute 1.1 (*)     Immature Granulocytes Absolute 0.2 (*)     All other components within normal limits   COMPREHENSIVE METABOLIC PANEL - Abnormal; Notable for the following components:    Potassium 2.9 (*)     Glucose 115 (*)     Creatinine 1.35 (*)     BUN/Creatinine Ratio 9 (*)     Est, Glom Filt Rate 54 (*)     Calcium 8.4 (*)     Total Bilirubin 1.3 (*)     Albumin 2.8 (*)     Albumin/Globulin Ratio 0.7 (*)     All other components within normal limits   URINALYSIS WITH REFLEX TO CULTURE - Abnormal; Notable for the following components:    Protein, UA 30 (*)     All other components within normal limits   C-REACTIVE PROTEIN - Abnormal; Notable for the following components:    CRP 22.80 (*)     All other components within normal limits   LIPASE - Abnormal; Notable for the following components:    Lipase 210 (*)     All other components within normal limits   AMYLASE - Abnormal; Notable for the following components:    Amylase 159 (*)     All other components within normal limits   POC LACTIC ACID - Abnormal; Notable for the following components:    POC Lactic Acid 2.03 (*)     All other components within normal limits   CULTURE, BLOOD 1   CULTURE, BLOOD 2   PROCALCITONIN   ETHANOL   EXTRA TUBES HOLD   POCT LACTIC ACID       All other labs were within normal range or not returned as of this dictation.    EMERGENCY DEPARTMENT COURSE and DIFFERENTIAL DIAGNOSIS/MDM:   Vitals:    Vitals:    11/29/24 0130 11/29/24 0245 11/29/24 0315 11/29/24 0323   BP: (!) 154/93   (!) 156/98   Pulse:   96 99   Resp:       Temp:       TempSrc:       SpO2: 97% 94% 93% 92%           Medical Decision Making  Assessment: 78-year-old male

## 2024-11-29 NOTE — H&P
Hospitalist Admission Note    NAME: Lorenzo Johnson   :  1945   MRN:  [id number]     Date/Time:  2024 3:48 AM    Patient PCP: Boo Stevens MD - Admission Date: 2024       Assessment & Plan:     Primary Problem:    # Acute pancreatitis without infection or necrosis  -Lipase 210, amylase 159  -Notable, abdominal distention, generalized TTP and decreased bowel sounds in all 4 quadrants on exam  -Pancreatitis changes noted on recent imaging with dilated CBD  -Admit to inpatient on med telemetry  -N.p.o.  -As needed antiemetics, analgesics  -IV fluids  -MRCP in the a.m.  -Trend lipase  -To consider GI consult, however will wait for MRCP results    Active Hospital Problems:    # Nausea, vomiting, diarrhea  -Likely due to ongoing a pancreatitis  -IV fluids  -As needed antiemetics  -See plan above for pancreatitis    # Leukocytosis  -WBC 21.4.  Lactic acid 2.03-->1.9.  CRP 22.08.  Currently afebrile  -Suspect etiology is secondary to inflammation and less likely due to infection  -Will hold off on continuing antibiotics at this tim2  -Trend WBC    # Hypokalemia  -Potassium 2.9  -Etiology most likely secondary to recent diarrhea  -Continue with potassium repletion while started in the ER  -Check magnesium now  -Monitor with routine labs.  Replete per electrolyte protocol    # PABLO  -Creatinine 1.35.  Notably above baseline.  -UA was significant for proteinuria  -Etiology most likely due to recent diarrhea and vomiting  -Strict I's and O's  -IV fluids  -Avoid nephrotoxic medications when possible  -Renally dose medications accordingly  -Monitor creatinine with a.m. labs  -To consider renal ultrasound and nephrology consult if PABLO does not improve with current interventions    # Hyperglycemia  -  -Etiology most likely secondary to ongoing pancreatitis  -Monitor with a.m. labs.  Check hemoglobin A1c if BG remains above goal    #

## 2024-11-29 NOTE — ED NOTES
TRANSFER - OUT REPORT:    Verbal report given to MAX Feldman on Lorenzo Johnson  being transferred to [facility] for routine progression of patient care       Report consisted of patient's Situation, Background, Assessment and   Recommendations(SBAR).     Information from the following report(s) Nurse Handoff Report, ED SBAR, MAR, and Recent Results was reviewed with the receiving nurse.    Tok Fall Assessment:    Presents to emergency department  because of falls (Syncope, seizure, or loss of consciousness): No  Age > 70: Yes  Altered Mental Status, Intoxication with alcohol or substance confusion (Disorientation, impaired judgment, poor safety awaremess, or inability to follow instructions): No  Impaired Mobility: Ambulates or transfers with assistive devices or assistance; Unable to ambulate or transer.: Yes  Nursing Judgement: Yes          Lines:   Peripheral IV 11/28/24 Left Antecubital (Active)   Site Assessment Clean, dry & intact 11/28/24 2332   Line Status Blood return noted 11/28/24 2332   Phlebitis Assessment No symptoms 11/28/24 2332   Infiltration Assessment 0 11/28/24 2332   Dressing Status New dressing applied 11/28/24 2332   Dressing Type Transparent 11/28/24 2332        Opportunity for questions and clarification was provided.

## 2024-11-30 LAB
ANION GAP SERPL CALC-SCNC: 8 MMOL/L (ref 2–12)
BASOPHILS # BLD: 0 K/UL (ref 0–0.1)
BASOPHILS NFR BLD: 0 % (ref 0–1)
BUN SERPL-MCNC: 14 MG/DL (ref 6–20)
BUN/CREAT SERPL: 18 (ref 12–20)
CALCIUM SERPL-MCNC: 7.7 MG/DL (ref 8.5–10.1)
CHLORIDE SERPL-SCNC: 105 MMOL/L (ref 97–108)
CO2 SERPL-SCNC: 24 MMOL/L (ref 21–32)
CREAT SERPL-MCNC: 0.8 MG/DL (ref 0.7–1.3)
DIFFERENTIAL METHOD BLD: ABNORMAL
EOSINOPHIL # BLD: 0 K/UL (ref 0–0.4)
EOSINOPHIL NFR BLD: 0 % (ref 0–7)
ERYTHROCYTE [DISTWIDTH] IN BLOOD BY AUTOMATED COUNT: 15.1 % (ref 11.5–14.5)
GLUCOSE SERPL-MCNC: 80 MG/DL (ref 65–100)
HCT VFR BLD AUTO: 41.1 % (ref 36.6–50.3)
HGB BLD-MCNC: 13.8 G/DL (ref 12.1–17)
IMM GRANULOCYTES # BLD AUTO: 0 K/UL
IMM GRANULOCYTES NFR BLD AUTO: 0 %
LYMPHOCYTES # BLD: 1.9 K/UL (ref 0.8–3.5)
LYMPHOCYTES NFR BLD: 10 % (ref 12–49)
MAGNESIUM SERPL-MCNC: 1.9 MG/DL (ref 1.6–2.4)
MCH RBC QN AUTO: 34.1 PG (ref 26–34)
MCHC RBC AUTO-ENTMCNC: 33.6 G/DL (ref 30–36.5)
MCV RBC AUTO: 101.5 FL (ref 80–99)
METAMYELOCYTES NFR BLD MANUAL: 1 %
MONOCYTES # BLD: 0.6 K/UL (ref 0–1)
MONOCYTES NFR BLD: 3 % (ref 5–13)
NEUTS BAND NFR BLD MANUAL: 1 % (ref 0–6)
NEUTS SEG # BLD: 15.9 K/UL (ref 1.8–8)
NEUTS SEG NFR BLD: 85 % (ref 32–75)
NRBC # BLD: 0 K/UL (ref 0–0.01)
NRBC BLD-RTO: 0 PER 100 WBC
PHOSPHATE SERPL-MCNC: 2.9 MG/DL (ref 2.6–4.7)
PLATELET # BLD AUTO: 192 K/UL (ref 150–400)
PMV BLD AUTO: 9.4 FL (ref 8.9–12.9)
POTASSIUM SERPL-SCNC: 3.4 MMOL/L (ref 3.5–5.1)
RBC # BLD AUTO: 4.05 M/UL (ref 4.1–5.7)
RBC MORPH BLD: ABNORMAL
SODIUM SERPL-SCNC: 137 MMOL/L (ref 136–145)
WBC # BLD AUTO: 18.5 K/UL (ref 4.1–11.1)

## 2024-11-30 PROCEDURE — 6360000002 HC RX W HCPCS: Performed by: FAMILY MEDICINE

## 2024-11-30 PROCEDURE — 2580000003 HC RX 258: Performed by: STUDENT IN AN ORGANIZED HEALTH CARE EDUCATION/TRAINING PROGRAM

## 2024-11-30 PROCEDURE — 1100000000 HC RM PRIVATE

## 2024-11-30 PROCEDURE — 36415 COLL VENOUS BLD VENIPUNCTURE: CPT

## 2024-11-30 PROCEDURE — 6370000000 HC RX 637 (ALT 250 FOR IP): Performed by: STUDENT IN AN ORGANIZED HEALTH CARE EDUCATION/TRAINING PROGRAM

## 2024-11-30 PROCEDURE — 80048 BASIC METABOLIC PNL TOTAL CA: CPT

## 2024-11-30 PROCEDURE — 6360000002 HC RX W HCPCS: Performed by: STUDENT IN AN ORGANIZED HEALTH CARE EDUCATION/TRAINING PROGRAM

## 2024-11-30 PROCEDURE — 84100 ASSAY OF PHOSPHORUS: CPT

## 2024-11-30 PROCEDURE — 2580000003 HC RX 258: Performed by: FAMILY MEDICINE

## 2024-11-30 PROCEDURE — 6370000000 HC RX 637 (ALT 250 FOR IP): Performed by: FAMILY MEDICINE

## 2024-11-30 PROCEDURE — 94761 N-INVAS EAR/PLS OXIMETRY MLT: CPT

## 2024-11-30 PROCEDURE — 85025 COMPLETE CBC W/AUTO DIFF WBC: CPT

## 2024-11-30 PROCEDURE — 83735 ASSAY OF MAGNESIUM: CPT

## 2024-11-30 RX ORDER — POTASSIUM CHLORIDE 7.45 MG/ML
10 INJECTION INTRAVENOUS
Status: ACTIVE | OUTPATIENT
Start: 2024-11-30 | End: 2024-11-30

## 2024-11-30 RX ORDER — DULOXETIN HYDROCHLORIDE 30 MG/1
30 CAPSULE, DELAYED RELEASE ORAL DAILY
Status: DISCONTINUED | OUTPATIENT
Start: 2024-11-30 | End: 2024-12-03 | Stop reason: HOSPADM

## 2024-11-30 RX ORDER — SODIUM CHLORIDE, SODIUM LACTATE, POTASSIUM CHLORIDE, CALCIUM CHLORIDE 600; 310; 30; 20 MG/100ML; MG/100ML; MG/100ML; MG/100ML
INJECTION, SOLUTION INTRAVENOUS CONTINUOUS
Status: DISCONTINUED | OUTPATIENT
Start: 2024-11-30 | End: 2024-11-30

## 2024-11-30 RX ORDER — CARVEDILOL 3.12 MG/1
3.12 TABLET ORAL 2 TIMES DAILY WITH MEALS
Status: DISCONTINUED | OUTPATIENT
Start: 2024-11-30 | End: 2024-12-03 | Stop reason: HOSPADM

## 2024-11-30 RX ORDER — SODIUM CHLORIDE 9 MG/ML
INJECTION, SOLUTION INTRAVENOUS CONTINUOUS
Status: DISCONTINUED | OUTPATIENT
Start: 2024-11-30 | End: 2024-11-30

## 2024-11-30 RX ORDER — SODIUM CHLORIDE 9 MG/ML
INJECTION, SOLUTION INTRAVENOUS CONTINUOUS
Status: DISCONTINUED | OUTPATIENT
Start: 2024-11-30 | End: 2024-12-02

## 2024-11-30 RX ADMIN — POTASSIUM CHLORIDE 40 MEQ: 750 TABLET, EXTENDED RELEASE ORAL at 08:56

## 2024-11-30 RX ADMIN — MORPHINE SULFATE 4 MG: 4 INJECTION, SOLUTION INTRAMUSCULAR; INTRAVENOUS at 03:52

## 2024-11-30 RX ADMIN — POTASSIUM CHLORIDE 40 MEQ: 750 TABLET, EXTENDED RELEASE ORAL at 19:50

## 2024-11-30 RX ADMIN — SODIUM CHLORIDE: 9 INJECTION, SOLUTION INTRAVENOUS at 13:47

## 2024-11-30 RX ADMIN — DULOXETINE HYDROCHLORIDE 30 MG: 30 CAPSULE, DELAYED RELEASE ORAL at 13:47

## 2024-11-30 RX ADMIN — MORPHINE SULFATE 4 MG: 4 INJECTION, SOLUTION INTRAMUSCULAR; INTRAVENOUS at 08:56

## 2024-11-30 RX ADMIN — SODIUM CHLORIDE, PRESERVATIVE FREE 10 ML: 5 INJECTION INTRAVENOUS at 20:06

## 2024-11-30 RX ADMIN — SODIUM CHLORIDE: 9 INJECTION, SOLUTION INTRAVENOUS at 23:43

## 2024-11-30 RX ADMIN — HYDRALAZINE HYDROCHLORIDE 5 MG: 20 INJECTION INTRAMUSCULAR; INTRAVENOUS at 03:52

## 2024-11-30 RX ADMIN — MORPHINE SULFATE 4 MG: 4 INJECTION, SOLUTION INTRAMUSCULAR; INTRAVENOUS at 11:44

## 2024-11-30 RX ADMIN — SODIUM CHLORIDE, PRESERVATIVE FREE 10 ML: 5 INJECTION INTRAVENOUS at 08:56

## 2024-11-30 RX ADMIN — MORPHINE SULFATE 4 MG: 4 INJECTION, SOLUTION INTRAMUSCULAR; INTRAVENOUS at 06:50

## 2024-11-30 RX ADMIN — PANTOPRAZOLE SODIUM 40 MG: 40 INJECTION, POWDER, FOR SOLUTION INTRAVENOUS at 08:56

## 2024-11-30 RX ADMIN — AMLODIPINE BESYLATE 5 MG: 5 TABLET ORAL at 08:56

## 2024-11-30 RX ADMIN — MORPHINE SULFATE 4 MG: 4 INJECTION, SOLUTION INTRAMUSCULAR; INTRAVENOUS at 23:45

## 2024-11-30 RX ADMIN — MORPHINE SULFATE 4 MG: 4 INJECTION, SOLUTION INTRAMUSCULAR; INTRAVENOUS at 20:00

## 2024-11-30 RX ADMIN — CARVEDILOL 3.12 MG: 3.12 TABLET, FILM COATED ORAL at 16:22

## 2024-11-30 RX ADMIN — MORPHINE SULFATE 4 MG: 4 INJECTION, SOLUTION INTRAMUSCULAR; INTRAVENOUS at 16:22

## 2024-11-30 RX ADMIN — SODIUM CHLORIDE: 9 INJECTION, SOLUTION INTRAVENOUS at 16:22

## 2024-11-30 RX ADMIN — LOSARTAN POTASSIUM 100 MG: 50 TABLET, FILM COATED ORAL at 08:56

## 2024-11-30 ASSESSMENT — PAIN - FUNCTIONAL ASSESSMENT
PAIN_FUNCTIONAL_ASSESSMENT: PREVENTS OR INTERFERES SOME ACTIVE ACTIVITIES AND ADLS
PAIN_FUNCTIONAL_ASSESSMENT: ACTIVITIES ARE NOT PREVENTED
PAIN_FUNCTIONAL_ASSESSMENT: PREVENTS OR INTERFERES SOME ACTIVE ACTIVITIES AND ADLS
PAIN_FUNCTIONAL_ASSESSMENT: ACTIVITIES ARE NOT PREVENTED

## 2024-11-30 ASSESSMENT — PAIN DESCRIPTION - ORIENTATION
ORIENTATION: MID

## 2024-11-30 ASSESSMENT — PAIN DESCRIPTION - LOCATION
LOCATION: ABDOMEN

## 2024-11-30 ASSESSMENT — PAIN SCALES - GENERAL
PAINLEVEL_OUTOF10: 9
PAINLEVEL_OUTOF10: 7
PAINLEVEL_OUTOF10: 8
PAINLEVEL_OUTOF10: 9
PAINLEVEL_OUTOF10: 10
PAINLEVEL_OUTOF10: 8
PAINLEVEL_OUTOF10: 9
PAINLEVEL_OUTOF10: 7
PAINLEVEL_OUTOF10: 8
PAINLEVEL_OUTOF10: 5
PAINLEVEL_OUTOF10: 6
PAINLEVEL_OUTOF10: 7

## 2024-11-30 ASSESSMENT — PAIN DESCRIPTION - DESCRIPTORS
DESCRIPTORS: SHARP
DESCRIPTORS: SHARP;STABBING
DESCRIPTORS: THROBBING;DULL
DESCRIPTORS: SHOOTING;SHARP
DESCRIPTORS: SHARP;STABBING
DESCRIPTORS: ACHING;CRAMPING
DESCRIPTORS: SHOOTING;SHARP
DESCRIPTORS: SHARP;STABBING

## 2024-11-30 NOTE — CONSULTS
[facility]          [address] [place]  [address]                              CONSULTATION      PATIENT NAME: JUAN PABLO              : 1945  MED REC NO: [id number]                       ROOM: A374  ACCOUNT NO: [id number]                       ADMIT DATE: 2024  PROVIDER: Edward Bo MD    DATE OF SERVICE:  2024    ATTENDING PHYSICIAN:  MARIA DE JESUS DURAN    REASON FOR CONSULTATION:  Consult note for gallstone pancreatitis.    HISTORY OF PRESENT ILLNESS:  The patient is a 78-year-old male with history of arthritis, back pain, PTSD, persistent abdominal pain, elevated lactase, lactic acidosis.  The patient has biliary dilatation, acute pancreatitis, gallbladder distention, biliary sludge.  This is a new episode for him.  The patient continued to endorse very severe abdominal pain that is only controlled marginally with medications.    REVIEW OF SYSTEMS:  Positive for above complaints.  A 12-point review of systems is negative other than noted in the HPI.    PAST MEDICAL HISTORY:  See HPI.  1. The patient takes chronic narcotics due to back pain, neck pain.  2. Depression.  3. Hypertension.  4. Hyperlipidemia.  5. Lumbar radiculopathy.  6. Psoriasiform dermatitis.    PAST SURGICAL HISTORY:  Lumbar laminectomy.    FAMILY HISTORY:  Unrelated to current evaluation.    SOCIAL HISTORY:  He is a never smoker.  He does not drink alcohol.    HOME MEDICATIONS:    1. Duloxetine.  2. Olmesartan.  3. Oxycodone ER and IR.  4. Triamcinolone cream for the dermatitis.    ALLERGIES:    1. BUPROPION.  2. FENTANYL.  3. MORPHINE.      PHYSICAL EXAMINATION:  VITAL SIGNS:  Reviewed.  GENERAL:  Age appropriate male, in no distress.  HEENT:  Normocephalic, atraumatic.  NECK:  Supple.  Trachea is midline.  CHEST:  Clear.  ABDOMEN:  Soft.  Focal pain in upper abdomen.  No evidence of global peritonitis.  MUSCULOSKELETAL:  No clubbing, cyanosis, or edema.  PSYCHIATRIC: 
IgG4      Thanks for allowing me to participate in the care of this patient.    Signed By: Tuan Howard MD     11/29/2024  1:38 PM

## 2024-12-01 LAB
ALBUMIN SERPL-MCNC: 2 G/DL (ref 3.5–5)
ALBUMIN/GLOB SERPL: 0.7 (ref 1.1–2.2)
ALP SERPL-CCNC: 73 U/L (ref 45–117)
ALT SERPL-CCNC: 22 U/L (ref 12–78)
ANION GAP SERPL CALC-SCNC: 8 MMOL/L (ref 2–12)
AST SERPL-CCNC: 38 U/L (ref 15–37)
BASOPHILS # BLD: 0 K/UL (ref 0–0.1)
BASOPHILS NFR BLD: 0 % (ref 0–1)
BILIRUB DIRECT SERPL-MCNC: 0.3 MG/DL (ref 0–0.2)
BILIRUB SERPL-MCNC: 0.8 MG/DL (ref 0.2–1)
BUN SERPL-MCNC: 13 MG/DL (ref 6–20)
BUN/CREAT SERPL: 21 (ref 12–20)
CALCIUM SERPL-MCNC: 7.6 MG/DL (ref 8.5–10.1)
CHLORIDE SERPL-SCNC: 107 MMOL/L (ref 97–108)
CO2 SERPL-SCNC: 22 MMOL/L (ref 21–32)
CREAT SERPL-MCNC: 0.61 MG/DL (ref 0.7–1.3)
DIFFERENTIAL METHOD BLD: ABNORMAL
EOSINOPHIL # BLD: 0.2 K/UL (ref 0–0.4)
EOSINOPHIL NFR BLD: 2 % (ref 0–7)
ERYTHROCYTE [DISTWIDTH] IN BLOOD BY AUTOMATED COUNT: 14.8 % (ref 11.5–14.5)
FOLATE SERPL-MCNC: 5.2 NG/ML (ref 5–21)
GLOBULIN SER CALC-MCNC: 3 G/DL (ref 2–4)
GLUCOSE SERPL-MCNC: 105 MG/DL (ref 65–100)
HCT VFR BLD AUTO: 38.4 % (ref 36.6–50.3)
HGB BLD-MCNC: 12.8 G/DL (ref 12.1–17)
IMM GRANULOCYTES # BLD AUTO: 0 K/UL
IMM GRANULOCYTES NFR BLD AUTO: 0 %
LIPASE SERPL-CCNC: 36 U/L (ref 13–75)
LYMPHOCYTES # BLD: 0.7 K/UL (ref 0.8–3.5)
LYMPHOCYTES NFR BLD: 7 % (ref 12–49)
MCH RBC QN AUTO: 34 PG (ref 26–34)
MCHC RBC AUTO-ENTMCNC: 33.3 G/DL (ref 30–36.5)
MCV RBC AUTO: 101.9 FL (ref 80–99)
MONOCYTES # BLD: 0.3 K/UL (ref 0–1)
MONOCYTES NFR BLD: 3 % (ref 5–13)
NEUTS BAND NFR BLD MANUAL: 2 % (ref 0–6)
NEUTS SEG # BLD: 9.3 K/UL (ref 1.8–8)
NEUTS SEG NFR BLD: 86 % (ref 32–75)
NRBC # BLD: 0 K/UL (ref 0–0.01)
NRBC BLD-RTO: 0 PER 100 WBC
PLATELET # BLD AUTO: 198 K/UL (ref 150–400)
PMV BLD AUTO: 9.8 FL (ref 8.9–12.9)
POTASSIUM SERPL-SCNC: 3.7 MMOL/L (ref 3.5–5.1)
PROT SERPL-MCNC: 5 G/DL (ref 6.4–8.2)
RBC # BLD AUTO: 3.77 M/UL (ref 4.1–5.7)
RBC MORPH BLD: ABNORMAL
SODIUM SERPL-SCNC: 137 MMOL/L (ref 136–145)
VIT B12 SERPL-MCNC: 307 PG/ML (ref 193–986)
WBC # BLD AUTO: 10.5 K/UL (ref 4.1–11.1)

## 2024-12-01 PROCEDURE — 80048 BASIC METABOLIC PNL TOTAL CA: CPT

## 2024-12-01 PROCEDURE — 36415 COLL VENOUS BLD VENIPUNCTURE: CPT

## 2024-12-01 PROCEDURE — 83690 ASSAY OF LIPASE: CPT

## 2024-12-01 PROCEDURE — 2580000003 HC RX 258: Performed by: STUDENT IN AN ORGANIZED HEALTH CARE EDUCATION/TRAINING PROGRAM

## 2024-12-01 PROCEDURE — 85025 COMPLETE CBC W/AUTO DIFF WBC: CPT

## 2024-12-01 PROCEDURE — 97161 PT EVAL LOW COMPLEX 20 MIN: CPT

## 2024-12-01 PROCEDURE — 82607 VITAMIN B-12: CPT

## 2024-12-01 PROCEDURE — 2580000003 HC RX 258: Performed by: FAMILY MEDICINE

## 2024-12-01 PROCEDURE — 2500000003 HC RX 250 WO HCPCS: Performed by: STUDENT IN AN ORGANIZED HEALTH CARE EDUCATION/TRAINING PROGRAM

## 2024-12-01 PROCEDURE — 97165 OT EVAL LOW COMPLEX 30 MIN: CPT

## 2024-12-01 PROCEDURE — 97530 THERAPEUTIC ACTIVITIES: CPT

## 2024-12-01 PROCEDURE — 1100000000 HC RM PRIVATE

## 2024-12-01 PROCEDURE — 6370000000 HC RX 637 (ALT 250 FOR IP): Performed by: STUDENT IN AN ORGANIZED HEALTH CARE EDUCATION/TRAINING PROGRAM

## 2024-12-01 PROCEDURE — 6360000002 HC RX W HCPCS: Performed by: FAMILY MEDICINE

## 2024-12-01 PROCEDURE — 94761 N-INVAS EAR/PLS OXIMETRY MLT: CPT

## 2024-12-01 PROCEDURE — 80076 HEPATIC FUNCTION PANEL: CPT

## 2024-12-01 PROCEDURE — 6370000000 HC RX 637 (ALT 250 FOR IP): Performed by: FAMILY MEDICINE

## 2024-12-01 PROCEDURE — 6360000002 HC RX W HCPCS: Performed by: STUDENT IN AN ORGANIZED HEALTH CARE EDUCATION/TRAINING PROGRAM

## 2024-12-01 PROCEDURE — 82746 ASSAY OF FOLIC ACID SERUM: CPT

## 2024-12-01 RX ORDER — HYDROMORPHONE HYDROCHLORIDE 1 MG/ML
0.5 INJECTION, SOLUTION INTRAMUSCULAR; INTRAVENOUS; SUBCUTANEOUS
Status: DISCONTINUED | OUTPATIENT
Start: 2024-12-01 | End: 2024-12-03 | Stop reason: HOSPADM

## 2024-12-01 RX ORDER — HYDROMORPHONE HYDROCHLORIDE 1 MG/ML
0.5 INJECTION, SOLUTION INTRAMUSCULAR; INTRAVENOUS; SUBCUTANEOUS ONCE
Status: COMPLETED | OUTPATIENT
Start: 2024-12-01 | End: 2024-12-01

## 2024-12-01 RX ADMIN — PANTOPRAZOLE SODIUM 40 MG: 40 INJECTION, POWDER, FOR SOLUTION INTRAVENOUS at 08:06

## 2024-12-01 RX ADMIN — HYDROMORPHONE HYDROCHLORIDE 0.5 MG: 1 INJECTION, SOLUTION INTRAMUSCULAR; INTRAVENOUS; SUBCUTANEOUS at 23:55

## 2024-12-01 RX ADMIN — Medication 6 MG: at 20:29

## 2024-12-01 RX ADMIN — MORPHINE SULFATE 4 MG: 4 INJECTION, SOLUTION INTRAMUSCULAR; INTRAVENOUS at 02:28

## 2024-12-01 RX ADMIN — HYDROMORPHONE HYDROCHLORIDE 0.5 MG: 1 INJECTION, SOLUTION INTRAMUSCULAR; INTRAVENOUS; SUBCUTANEOUS at 17:03

## 2024-12-01 RX ADMIN — HYDROMORPHONE HYDROCHLORIDE 0.5 MG: 1 INJECTION, SOLUTION INTRAMUSCULAR; INTRAVENOUS; SUBCUTANEOUS at 03:00

## 2024-12-01 RX ADMIN — HYDROMORPHONE HYDROCHLORIDE 0.5 MG: 1 INJECTION, SOLUTION INTRAMUSCULAR; INTRAVENOUS; SUBCUTANEOUS at 13:13

## 2024-12-01 RX ADMIN — HYDROMORPHONE HYDROCHLORIDE 0.5 MG: 1 INJECTION, SOLUTION INTRAMUSCULAR; INTRAVENOUS; SUBCUTANEOUS at 10:06

## 2024-12-01 RX ADMIN — CARVEDILOL 3.12 MG: 3.12 TABLET, FILM COATED ORAL at 17:03

## 2024-12-01 RX ADMIN — SODIUM CHLORIDE: 9 INJECTION, SOLUTION INTRAVENOUS at 18:21

## 2024-12-01 RX ADMIN — CARVEDILOL 3.12 MG: 3.12 TABLET, FILM COATED ORAL at 08:06

## 2024-12-01 RX ADMIN — DULOXETINE HYDROCHLORIDE 30 MG: 30 CAPSULE, DELAYED RELEASE ORAL at 08:06

## 2024-12-01 RX ADMIN — HYDRALAZINE HYDROCHLORIDE 5 MG: 20 INJECTION INTRAMUSCULAR; INTRAVENOUS at 05:43

## 2024-12-01 RX ADMIN — ACETAMINOPHEN 650 MG: 325 TABLET ORAL at 08:09

## 2024-12-01 RX ADMIN — HYDROMORPHONE HYDROCHLORIDE 0.5 MG: 1 INJECTION, SOLUTION INTRAMUSCULAR; INTRAVENOUS; SUBCUTANEOUS at 20:29

## 2024-12-01 RX ADMIN — SODIUM CHLORIDE: 9 INJECTION, SOLUTION INTRAVENOUS at 08:16

## 2024-12-01 RX ADMIN — LOSARTAN POTASSIUM 100 MG: 50 TABLET, FILM COATED ORAL at 08:06

## 2024-12-01 RX ADMIN — MORPHINE SULFATE 4 MG: 4 INJECTION, SOLUTION INTRAMUSCULAR; INTRAVENOUS at 08:08

## 2024-12-01 RX ADMIN — MORPHINE SULFATE 4 MG: 4 INJECTION, SOLUTION INTRAMUSCULAR; INTRAVENOUS at 05:18

## 2024-12-01 RX ADMIN — AMLODIPINE BESYLATE 5 MG: 5 TABLET ORAL at 08:06

## 2024-12-01 RX ADMIN — SODIUM CHLORIDE, PRESERVATIVE FREE 10 ML: 5 INJECTION INTRAVENOUS at 08:07

## 2024-12-01 ASSESSMENT — PAIN SCALES - GENERAL
PAINLEVEL_OUTOF10: 6
PAINLEVEL_OUTOF10: 9
PAINLEVEL_OUTOF10: 8
PAINLEVEL_OUTOF10: 8
PAINLEVEL_OUTOF10: 9
PAINLEVEL_OUTOF10: 9
PAINLEVEL_OUTOF10: 6
PAINLEVEL_OUTOF10: 9
PAINLEVEL_OUTOF10: 9
PAINLEVEL_OUTOF10: 6
PAINLEVEL_OUTOF10: 6
PAINLEVEL_OUTOF10: 9
PAINLEVEL_OUTOF10: 8
PAINLEVEL_OUTOF10: 10

## 2024-12-01 ASSESSMENT — PAIN DESCRIPTION - LOCATION
LOCATION: ABDOMEN
LOCATION: ABDOMEN;BACK;NECK
LOCATION: ABDOMEN

## 2024-12-01 ASSESSMENT — PAIN DESCRIPTION - ORIENTATION
ORIENTATION: ANTERIOR
ORIENTATION: ANTERIOR
ORIENTATION: DISTAL
ORIENTATION: ANTERIOR

## 2024-12-01 ASSESSMENT — PAIN - FUNCTIONAL ASSESSMENT
PAIN_FUNCTIONAL_ASSESSMENT: ACTIVITIES ARE NOT PREVENTED

## 2024-12-01 ASSESSMENT — PAIN DESCRIPTION - DESCRIPTORS
DESCRIPTORS: ACHING

## 2024-12-02 ENCOUNTER — ANESTHESIA EVENT (OUTPATIENT)
Dept: OPERATING ROOM | Age: 79
End: 2024-12-02

## 2024-12-02 ENCOUNTER — ANESTHESIA (OUTPATIENT)
Dept: OPERATING ROOM | Age: 79
End: 2024-12-02

## 2024-12-02 LAB
ALBUMIN SERPL-MCNC: 2.3 G/DL (ref 3.5–5)
ALBUMIN/GLOB SERPL: 0.5 (ref 1.1–2.2)
ALP SERPL-CCNC: 84 U/L (ref 45–117)
ALT SERPL-CCNC: 33 U/L (ref 12–78)
ANION GAP SERPL CALC-SCNC: 6 MMOL/L (ref 2–12)
AST SERPL-CCNC: 45 U/L (ref 15–37)
BASOPHILS # BLD: 0 K/UL (ref 0–0.1)
BASOPHILS NFR BLD: 0 % (ref 0–1)
BILIRUB DIRECT SERPL-MCNC: 0.2 MG/DL (ref 0–0.2)
BILIRUB SERPL-MCNC: 0.6 MG/DL (ref 0.2–1)
BUN SERPL-MCNC: 8 MG/DL (ref 6–20)
BUN/CREAT SERPL: 10 (ref 12–20)
CALCIUM SERPL-MCNC: 8.6 MG/DL (ref 8.5–10.1)
CHLORIDE SERPL-SCNC: 107 MMOL/L (ref 97–108)
CO2 SERPL-SCNC: 24 MMOL/L (ref 21–32)
CREAT SERPL-MCNC: 0.81 MG/DL (ref 0.7–1.3)
DIFFERENTIAL METHOD BLD: ABNORMAL
EOSINOPHIL # BLD: 0 K/UL (ref 0–0.4)
EOSINOPHIL NFR BLD: 0 % (ref 0–7)
ERYTHROCYTE [DISTWIDTH] IN BLOOD BY AUTOMATED COUNT: 14.4 % (ref 11.5–14.5)
GLOBULIN SER CALC-MCNC: 4.2 G/DL (ref 2–4)
GLUCOSE SERPL-MCNC: 117 MG/DL (ref 65–100)
HCT VFR BLD AUTO: 42 % (ref 36.6–50.3)
HGB BLD-MCNC: 14 G/DL (ref 12.1–17)
IMM GRANULOCYTES # BLD AUTO: 0.1 K/UL (ref 0–0.04)
IMM GRANULOCYTES NFR BLD AUTO: 1 % (ref 0–0.5)
LIPASE SERPL-CCNC: 27 U/L (ref 13–75)
LYMPHOCYTES # BLD: 1.1 K/UL (ref 0.8–3.5)
LYMPHOCYTES NFR BLD: 11 % (ref 12–49)
MCH RBC QN AUTO: 34.1 PG (ref 26–34)
MCHC RBC AUTO-ENTMCNC: 33.3 G/DL (ref 30–36.5)
MCV RBC AUTO: 102.4 FL (ref 80–99)
MONOCYTES # BLD: 1.1 K/UL (ref 0–1)
MONOCYTES NFR BLD: 11 % (ref 5–13)
NEUTS SEG # BLD: 7.6 K/UL (ref 1.8–8)
NEUTS SEG NFR BLD: 77 % (ref 32–75)
NRBC # BLD: 0 K/UL (ref 0–0.01)
NRBC BLD-RTO: 0 PER 100 WBC
PLATELET # BLD AUTO: 248 K/UL (ref 150–400)
PMV BLD AUTO: 9.9 FL (ref 8.9–12.9)
POTASSIUM SERPL-SCNC: 3.3 MMOL/L (ref 3.5–5.1)
PROT SERPL-MCNC: 6.5 G/DL (ref 6.4–8.2)
RBC # BLD AUTO: 4.1 M/UL (ref 4.1–5.7)
SODIUM SERPL-SCNC: 137 MMOL/L (ref 136–145)
WBC # BLD AUTO: 9.9 K/UL (ref 4.1–11.1)

## 2024-12-02 PROCEDURE — 6360000002 HC RX W HCPCS: Performed by: STUDENT IN AN ORGANIZED HEALTH CARE EDUCATION/TRAINING PROGRAM

## 2024-12-02 PROCEDURE — 6370000000 HC RX 637 (ALT 250 FOR IP): Performed by: STUDENT IN AN ORGANIZED HEALTH CARE EDUCATION/TRAINING PROGRAM

## 2024-12-02 PROCEDURE — 80076 HEPATIC FUNCTION PANEL: CPT

## 2024-12-02 PROCEDURE — 6370000000 HC RX 637 (ALT 250 FOR IP): Performed by: FAMILY MEDICINE

## 2024-12-02 PROCEDURE — 2500000003 HC RX 250 WO HCPCS: Performed by: STUDENT IN AN ORGANIZED HEALTH CARE EDUCATION/TRAINING PROGRAM

## 2024-12-02 PROCEDURE — 94761 N-INVAS EAR/PLS OXIMETRY MLT: CPT

## 2024-12-02 PROCEDURE — 36415 COLL VENOUS BLD VENIPUNCTURE: CPT

## 2024-12-02 PROCEDURE — 83690 ASSAY OF LIPASE: CPT

## 2024-12-02 PROCEDURE — 1100000000 HC RM PRIVATE

## 2024-12-02 PROCEDURE — 6360000002 HC RX W HCPCS: Performed by: FAMILY MEDICINE

## 2024-12-02 PROCEDURE — 2580000003 HC RX 258: Performed by: STUDENT IN AN ORGANIZED HEALTH CARE EDUCATION/TRAINING PROGRAM

## 2024-12-02 PROCEDURE — 80048 BASIC METABOLIC PNL TOTAL CA: CPT

## 2024-12-02 PROCEDURE — 97116 GAIT TRAINING THERAPY: CPT

## 2024-12-02 PROCEDURE — 85025 COMPLETE CBC W/AUTO DIFF WBC: CPT

## 2024-12-02 PROCEDURE — 2580000003 HC RX 258: Performed by: FAMILY MEDICINE

## 2024-12-02 RX ORDER — MIDAZOLAM HYDROCHLORIDE 2 MG/2ML
2 INJECTION, SOLUTION INTRAMUSCULAR; INTRAVENOUS
OUTPATIENT
Start: 2024-12-02 | End: 2024-12-03

## 2024-12-02 RX ORDER — LIDOCAINE HYDROCHLORIDE 10 MG/ML
1 INJECTION, SOLUTION EPIDURAL; INFILTRATION; INTRACAUDAL; PERINEURAL
OUTPATIENT
Start: 2024-12-02 | End: 2024-12-03

## 2024-12-02 RX ORDER — SODIUM CHLORIDE, SODIUM LACTATE, POTASSIUM CHLORIDE, CALCIUM CHLORIDE 600; 310; 30; 20 MG/100ML; MG/100ML; MG/100ML; MG/100ML
INJECTION, SOLUTION INTRAVENOUS CONTINUOUS
OUTPATIENT
Start: 2024-12-02

## 2024-12-02 RX ORDER — DIPHENHYDRAMINE HYDROCHLORIDE 50 MG/ML
12.5 INJECTION INTRAMUSCULAR; INTRAVENOUS
OUTPATIENT
Start: 2024-12-02 | End: 2024-12-03

## 2024-12-02 RX ORDER — NALOXONE HYDROCHLORIDE 0.4 MG/ML
INJECTION, SOLUTION INTRAMUSCULAR; INTRAVENOUS; SUBCUTANEOUS PRN
OUTPATIENT
Start: 2024-12-02

## 2024-12-02 RX ORDER — ONDANSETRON 2 MG/ML
4 INJECTION INTRAMUSCULAR; INTRAVENOUS
OUTPATIENT
Start: 2024-12-02 | End: 2024-12-03

## 2024-12-02 RX ORDER — FENTANYL CITRATE 50 UG/ML
100 INJECTION, SOLUTION INTRAMUSCULAR; INTRAVENOUS
OUTPATIENT
Start: 2024-12-02 | End: 2024-12-03

## 2024-12-02 RX ADMIN — HYDRALAZINE HYDROCHLORIDE 5 MG: 20 INJECTION INTRAMUSCULAR; INTRAVENOUS at 02:44

## 2024-12-02 RX ADMIN — PANTOPRAZOLE SODIUM 40 MG: 40 INJECTION, POWDER, FOR SOLUTION INTRAVENOUS at 08:38

## 2024-12-02 RX ADMIN — HYDROMORPHONE HYDROCHLORIDE 0.5 MG: 1 INJECTION, SOLUTION INTRAMUSCULAR; INTRAVENOUS; SUBCUTANEOUS at 16:27

## 2024-12-02 RX ADMIN — SODIUM CHLORIDE, PRESERVATIVE FREE 10 ML: 5 INJECTION INTRAVENOUS at 08:39

## 2024-12-02 RX ADMIN — HYDROMORPHONE HYDROCHLORIDE 0.5 MG: 1 INJECTION, SOLUTION INTRAMUSCULAR; INTRAVENOUS; SUBCUTANEOUS at 12:42

## 2024-12-02 RX ADMIN — HYDROMORPHONE HYDROCHLORIDE 0.5 MG: 1 INJECTION, SOLUTION INTRAMUSCULAR; INTRAVENOUS; SUBCUTANEOUS at 08:44

## 2024-12-02 RX ADMIN — SODIUM CHLORIDE: 9 INJECTION, SOLUTION INTRAVENOUS at 02:45

## 2024-12-02 RX ADMIN — HYDRALAZINE HYDROCHLORIDE 5 MG: 20 INJECTION INTRAMUSCULAR; INTRAVENOUS at 08:57

## 2024-12-02 RX ADMIN — POTASSIUM BICARBONATE 40 MEQ: 391 TABLET, EFFERVESCENT ORAL at 16:26

## 2024-12-02 RX ADMIN — HYDROMORPHONE HYDROCHLORIDE 0.5 MG: 1 INJECTION, SOLUTION INTRAMUSCULAR; INTRAVENOUS; SUBCUTANEOUS at 23:46

## 2024-12-02 RX ADMIN — Medication 6 MG: at 23:47

## 2024-12-02 RX ADMIN — HYDROMORPHONE HYDROCHLORIDE 0.5 MG: 1 INJECTION, SOLUTION INTRAMUSCULAR; INTRAVENOUS; SUBCUTANEOUS at 05:38

## 2024-12-02 RX ADMIN — CARVEDILOL 3.12 MG: 3.12 TABLET, FILM COATED ORAL at 16:26

## 2024-12-02 RX ADMIN — SODIUM CHLORIDE: 9 INJECTION, SOLUTION INTRAVENOUS at 12:49

## 2024-12-02 ASSESSMENT — PAIN DESCRIPTION - DESCRIPTORS
DESCRIPTORS: ACHING;SORE
DESCRIPTORS: ACHING

## 2024-12-02 ASSESSMENT — PAIN SCALES - GENERAL
PAINLEVEL_OUTOF10: 8
PAINLEVEL_OUTOF10: 7
PAINLEVEL_OUTOF10: 6
PAINLEVEL_OUTOF10: 9
PAINLEVEL_OUTOF10: 9
PAINLEVEL_OUTOF10: 10
PAINLEVEL_OUTOF10: 7
PAINLEVEL_OUTOF10: 6
PAINLEVEL_OUTOF10: 9
PAINLEVEL_OUTOF10: 6

## 2024-12-02 ASSESSMENT — PAIN DESCRIPTION - LOCATION
LOCATION: ABDOMEN

## 2024-12-02 ASSESSMENT — PAIN - FUNCTIONAL ASSESSMENT
PAIN_FUNCTIONAL_ASSESSMENT: ACTIVITIES ARE NOT PREVENTED

## 2024-12-02 ASSESSMENT — PAIN DESCRIPTION - ORIENTATION
ORIENTATION: ANTERIOR
ORIENTATION: MID
ORIENTATION: ANTERIOR
ORIENTATION: ANTERIOR

## 2024-12-02 NOTE — CARE COORDINATION
12/2/2024  12:29 PM  12/2/2024  12:29 PM      ICD-10-CM    1. Idiopathic acute pancreatitis without infection or necrosis  K85.00       2. Hypokalemia  E87.6           General Risk Score: 3   Values used to calculate this score:    Points  Metrics       1        Age: 79       1        Hospital Admissions: 1       0        ED Visits: 0       0        Has Chronic Obstructive Pulmonary Disease: No       0        Has Diabetes: No       0        Has Congestive Heart Failure: No       0        Has Liver Disease: No       1        Has Depression: Yes       0        Current PCP: Boo Stevens MD       0        Has Medicaid: No      CM reviewed EMR, Gen Surg following, plan for lap Chle today 12/2.  PT/OT evasl completed, no skilled services at DC, no DME ordered.  Pt lives in [place] w/ daughter and is independent at baseline  No CM needs indicated at this time. Per IDR, estimated discharge date is 12/3/24.   Providers, if needs arise, please consult case management.   BRANDON Alcaraz

## 2024-12-02 NOTE — PLAN OF CARE
Problem: Discharge Planning  Goal: Discharge to home or other facility with appropriate resources  Outcome: Progressing     Problem: Safety - Adult  Goal: Free from fall injury  Outcome: Progressing     Problem: Pain  Goal: Verbalizes/displays adequate comfort level or baseline comfort level  Outcome: Progressing     
  Problem: Physical Therapy - Adult  Goal: By Discharge: Performs mobility at highest level of function for planned discharge setting.  See evaluation for individualized goals.  Description: FUNCTIONAL STATUS PRIOR TO ADMISSION: Patient was independent without use of DME. Denies fall history     HOME SUPPORT PRIOR TO ADMISSION: The patient reports living with his daughter and her family in [place], is in town visiting his house and spouse here in [place]. Reports someone is usually always at home with him during the day.   Physical Therapy Goals  Initiated 12/1/2024  1.  Patient will move from supine to sit and sit to supine, scoot up and down, and roll side to side in bed with independence within 7 day(s).    2.  Patient will perform sit to stand with independence within 7 day(s).  3.  Patient will transfer from bed to chair and chair to bed with independence using the least restrictive device within 7 day(s).  4.  Patient will ambulate with independence for 200 feet with the least restrictive device within 7 day(s).   5.  Patient will ascend/descend 4 stairs with 1-2 handrail(s) with supervision/set-up within 7 day(s).    Outcome: Progressing   PHYSICAL THERAPY EVALUATION    Patient: Lorenzo Johnson (79 y.o. male)  Date: 12/1/2024  Primary Diagnosis: Hypokalemia [E87.6]  Idiopathic acute pancreatitis without infection or necrosis [K85.00]  Acute pancreatitis without infection or necrosis [K85.90]       Precautions: Restrictions/Precautions: Fall Risk                      ASSESSMENT :   DEFICITS/IMPAIRMENTS:   The patient is limited by decreased functional mobility, independence in ADLs, activity tolerance, attention/concentration, balance and increased pain levels in the setting of hospital admission for acute pancreatitis with likely planned cholecystectomy while inpatient. Patient is highly pleasant and cooperative with PT evaluation, despite reporting 9/10 abdominal pain. He reports pain increased with 
  Problem: Safety - Adult  Goal: Free from fall injury  Outcome: Progressing     Problem: Pain  Goal: Verbalizes/displays adequate comfort level or baseline comfort level  Outcome: Progressing     
Patient admitted for abdominal pain/distention related to acute pancreatitis. Abdominal/pelvic CT showed dilation of common bile duct and possible cholelithiasis- per GI note, plan is for possible cholecystectomy. Patient is currently NPO with sips of water/meds. Continues to report persistent 6-8/10 abdominal pain; prn medications include IV Morphine 2-4 mg Q2 hr prn. Will continue to monitor and assess.  
bout, including one mild loss of balance to his right requiring CGA from PT and steppage compensation to recover. On second ambulation bout of same distance, patient without any instability or loss of balance throughout and improved path deviations. Will continue to follow while inpatient.          PLAN:  Patient continues to benefit from skilled intervention to address the above impairments.  Continue treatment per established plan of care.    Recommendations for staff mobility and toileting assistance:  Recommend toileting using  recommended toilet device: the bathroom with staff assist x1 using  gait belt.      Recommendation for discharge: (in order for the patient to meet his/her long term goals):   No skilled physical therapy    Other factors to consider for discharge: concern for safely navigating or managing the home environment    IF patient discharges home will need the following DME: none       SUBJECTIVE:   Patient stated, \"My pain is much better today.\" Re: compared to 9/10 abdominal pain yesterday. Also reporting his surgery will be scheduled on an outpatient basis next week.     OBJECTIVE DATA SUMMARY:   Patient received supine in bed and was agreeable to participate in PT session.   Patient was cleared by nursing to participate in PT session.   No family present during session.       Critical Behavior:  Orientation  Overall Orientation Status: Within Functional Limits  Orientation Level: Oriented X4  Cognition  Overall Cognitive Status: WFL    Functional Mobility Training:  Bed Mobility:  Bed Mobility Training  Bed Mobility Training: Yes  Overall Level of Assistance: Supervision  Supine to Sit: Supervision  Scooting: Modified independent  Transfers:  Transfer Training  Sit to Stand: Stand-by assistance  Stand to Sit: Stand-by assistance  Bed to Chair: Stand-by assistance  Balance:  Balance  Sitting: Intact  Standing: Impaired  Standing - Static: Good  Standing - Dynamic: Fair;Good   Ambulation/Gait 
Supervision  Supine to Sit: Supervision  Sit to Supine: Supervision  Scooting: Supervision    Transfers:      Transfer Training  Transfer Training: Yes  Sit to Stand: Stand-by assistance  Stand to Sit: Stand-by assistance  Bed to Chair: Contact-guard assistance          Functional Mobility: Contact guard assistance          Balance:      Balance  Sitting: Intact  Standing: Impaired  Standing - Static: Good  Standing - Dynamic: Fair      ADL Assessment:          Feeding: Independent;Based on clinical judgement       Grooming: Contact guard assistance;Based on clinical judgement       UE Bathing: Setup;Supervision;Based on clinical judgement       Skin Care: Bath wipes;Chlorhexidine wipes    LE Bathing: Contact guard assistance;Based on clinical judgement       UE Dressing: Setup;Supervision;Based on clinical judgement       LE Dressing: Contact guard assistance  LE Dressing Skilled Clinical Factors: socks at EOB    Toileting: Contact guard assistance;Based on clinical judgement  Toileting Skilled Clinical Factors: limited mobility tolerance secondary to abd pain         Functional Mobility: Contact guard assistance            ADL Intervention and task modifications:      Patient instructed and indicated understanding the benefits of maintaining activity tolerance, functional mobility, and independence with self care tasks during acute stay  to ensure safe return home and to baseline. Encouraged patient to increase frequency and duration OOB, be out of bed for all meals, perform daily ADLs (as approved by RN/MD regarding bathing etc), and performing functional mobility to/from bathroom.  Provided ed on fall prevention techniques that include use of recommended AD, safe footwear, good lighting, glasses PRN, medication mgmt per MD and clear pathways.  Patient with fair understanding as evidence by teach back and conversation.             [facility] AM-PACTM \"6 Clicks\"

## 2024-12-03 ENCOUNTER — TELEPHONE (OUTPATIENT)
Facility: CLINIC | Age: 79
End: 2024-12-03

## 2024-12-03 VITALS
RESPIRATION RATE: 17 BRPM | SYSTOLIC BLOOD PRESSURE: 125 MMHG | DIASTOLIC BLOOD PRESSURE: 75 MMHG | HEART RATE: 69 BPM | HEIGHT: 66 IN | OXYGEN SATURATION: 95 % | BODY MASS INDEX: 29.97 KG/M2 | WEIGHT: 186.51 LBS | TEMPERATURE: 99.1 F

## 2024-12-03 LAB
ANION GAP SERPL CALC-SCNC: 8 MMOL/L (ref 2–12)
BUN SERPL-MCNC: 12 MG/DL (ref 6–20)
BUN/CREAT SERPL: 13 (ref 12–20)
CALCIUM SERPL-MCNC: 8 MG/DL (ref 8.5–10.1)
CHLORIDE SERPL-SCNC: 106 MMOL/L (ref 97–108)
CO2 SERPL-SCNC: 24 MMOL/L (ref 21–32)
CREAT SERPL-MCNC: 0.91 MG/DL (ref 0.7–1.3)
GLUCOSE SERPL-MCNC: 120 MG/DL (ref 65–100)
POTASSIUM SERPL-SCNC: 3.8 MMOL/L (ref 3.5–5.1)
SODIUM SERPL-SCNC: 138 MMOL/L (ref 136–145)

## 2024-12-03 PROCEDURE — 36415 COLL VENOUS BLD VENIPUNCTURE: CPT

## 2024-12-03 PROCEDURE — 6370000000 HC RX 637 (ALT 250 FOR IP): Performed by: STUDENT IN AN ORGANIZED HEALTH CARE EDUCATION/TRAINING PROGRAM

## 2024-12-03 PROCEDURE — 80048 BASIC METABOLIC PNL TOTAL CA: CPT

## 2024-12-03 PROCEDURE — 2580000003 HC RX 258: Performed by: FAMILY MEDICINE

## 2024-12-03 PROCEDURE — 6360000002 HC RX W HCPCS: Performed by: FAMILY MEDICINE

## 2024-12-03 PROCEDURE — 94761 N-INVAS EAR/PLS OXIMETRY MLT: CPT

## 2024-12-03 PROCEDURE — 2500000003 HC RX 250 WO HCPCS: Performed by: STUDENT IN AN ORGANIZED HEALTH CARE EDUCATION/TRAINING PROGRAM

## 2024-12-03 RX ORDER — AMLODIPINE BESYLATE 5 MG/1
5 TABLET ORAL DAILY
Qty: 30 TABLET | Refills: 3 | Status: SHIPPED | OUTPATIENT
Start: 2024-12-04

## 2024-12-03 RX ORDER — ONDANSETRON 4 MG/1
4 TABLET, ORALLY DISINTEGRATING ORAL 3 TIMES DAILY PRN
Qty: 21 TABLET | Refills: 0 | Status: SHIPPED | OUTPATIENT
Start: 2024-12-03

## 2024-12-03 RX ADMIN — SODIUM CHLORIDE, PRESERVATIVE FREE 10 ML: 5 INJECTION INTRAVENOUS at 08:59

## 2024-12-03 RX ADMIN — CARVEDILOL 3.12 MG: 3.12 TABLET, FILM COATED ORAL at 08:59

## 2024-12-03 RX ADMIN — HYDROMORPHONE HYDROCHLORIDE 0.5 MG: 1 INJECTION, SOLUTION INTRAMUSCULAR; INTRAVENOUS; SUBCUTANEOUS at 09:00

## 2024-12-03 RX ADMIN — PANTOPRAZOLE SODIUM 40 MG: 40 INJECTION, POWDER, FOR SOLUTION INTRAVENOUS at 08:59

## 2024-12-03 RX ADMIN — DULOXETINE HYDROCHLORIDE 30 MG: 30 CAPSULE, DELAYED RELEASE ORAL at 08:58

## 2024-12-03 RX ADMIN — AMLODIPINE BESYLATE 5 MG: 5 TABLET ORAL at 08:59

## 2024-12-03 RX ADMIN — LOSARTAN POTASSIUM 100 MG: 50 TABLET, FILM COATED ORAL at 08:59

## 2024-12-03 ASSESSMENT — PAIN DESCRIPTION - LOCATION
LOCATION: ABDOMEN

## 2024-12-03 ASSESSMENT — PAIN SCALES - GENERAL
PAINLEVEL_OUTOF10: 3
PAINLEVEL_OUTOF10: 8
PAINLEVEL_OUTOF10: 3
PAINLEVEL_OUTOF10: 8
PAINLEVEL_OUTOF10: 1

## 2024-12-03 ASSESSMENT — PAIN DESCRIPTION - ORIENTATION: ORIENTATION: UPPER;ANTERIOR

## 2024-12-03 ASSESSMENT — PAIN DESCRIPTION - DESCRIPTORS: DESCRIPTORS: SHARP

## 2024-12-03 NOTE — TELEPHONE ENCOUNTER
Anai called from [facility] an she will like a verbal confirmation if the provider will sign and follow home health PT, OT orders for the patient.   [phone]

## 2024-12-03 NOTE — DISCHARGE INSTRUCTIONS
HOSPITALIST DISCHARGE INSTRUCTIONS  NAME:  Lorenzo Johnson   :  1945   MRN:  [id number]     Date/Time:  12/3/2024 1:51 PM    ADMIT DATE: 2024     DISCHARGE DATE: 12/3/2024     DISCHARGE DIAGNOSIS:  Pancreatitis due to gall stones, umbilical hernia    DISCHARGE INSTRUCTIONS:  Thank you for allowing us to participate in your care. Your discharging Hospitalist is Steve colon MD. You were admitted for evaluation and treatment of the above.     Please follow up with general surgery for your gall bladder removal and hernia repair on 24.     Please eat small, bland meals. Drink as much fluids as you can. You can escalate as your diet as your symptoms improve. I will send in a prescription called zofran which you can take as needed for nausea. You can continue your home oxycodone as needed for pain.     Your blood pressure was elevated and I added a medication called amlodipine. Please monitor your blood pressure at home and follow up with your primary care doctor.      We have set you up with home health. Please call the home health team to set up follow up.     MEDICATIONS:    It is important that you take the medication exactly as they are prescribed.   Keep your medication in the bottles provided by the pharmacist and keep a list of the medication names, dosages, and times to be taken in your wallet.   Do not take other medications without consulting your doctor.             If you experience any of the following symptoms then please call your primary care physician or return to the emergency room if you cannot get hold of your doctor:  Fever, chills, nausea, vomiting, diarrhea, change in mentation, falling, bleeding, shortness of breath    Follow Up:  Please call the below provider to arrange hospital follow up appointment      Edward Bo MD  [address] [address] [address] [phone]    Follow up  Surgery scheduled 24    Boo Stevens MD  [address]

## 2024-12-03 NOTE — CARE COORDINATION
Care Management Progress Note    Reason for Admission:   Hypokalemia [E87.6]  Idiopathic acute pancreatitis without infection or necrosis [K85.00]  Acute pancreatitis without infection or necrosis [K85.90]  Procedure(s) (LRB):  HERNIA UMBILICAL REPAIR (N/A)  * Surgery Date in Future *    Patient Admission Status: Inpatient  RUR: 8% low  Hospitalization in the last 30 days (Readmission):  No        Transition of care plan:  Patient stable for DC and is agreeable to home health PT/OT. CM to send referrals and update AVS with accepting provider info. CM left voice message for daughter.  Discharge plan communicated with patient and/or discharge caregiver: No    Date 1st IMM letter given: 11/29/24  Outpatient follow-up: PCP, Specialist  Transport at discharge: Patient     2:25 CM met with patient who reports that he drove himself to the hospital. He does not live with his wife. He has been with his daughter in [place]. Patient has plans to DC and go to a motel/hotel tonight locally until he returns to his daughter's home. CM attempted phone call to his daughter but no answer. If patient does not live locally, CM may not be able to secure home health but would need to verify patient's daughter's address.    3:45 CM followed up with patient. He reports that he is in between staying with his daughter and staying at a local motel as he and his wife are basically . Wife does not allow access to the home. CM explained to patient that he would need a stable address in order to receive home health and be home bound. Patient in agreement with outpatient therapy order. CM requested order from attending. Patient otherwise is okay to DC.     Gabby Mosquera, MS  [phone]

## 2024-12-03 NOTE — PROGRESS NOTES
Hospitalist Progress Note      NAME:  Lorenzo Johnson   :  1945  MRM:  [id number]    Date/Time: 2024  7:49 AM           Assessment / Plan:     78 y.o. male with a medical history significant forarthritis, chronic back pain, psoriasis, PTSD, chronic neck pain, status post lumbar laminectomy, who presents to the ER with abdominal pain.     Abdominal pain d/t Acute pancreatitis   Dilated CBD  - Noted on CT abdomen pelvis, US abdomen showed dilated CBD.  Pancreatitis likely biliary etiology.  MRCP showed acute pancreatitis & biliary ductal dilatation without choledocholithiasis  - On IVF  - Pain control   - GI following, recommending surgery eval for cholecystectomy  - Gen surg consulted      Hypokalemia  - Potassium 2.9 on admission.  Likely due to decreased intake.  - Monitor and replete      Hypocalcemia  - Corrected calcium 8.6  - Replete      Leukocytosis   - D/t pancreatitis, improving without abx     Hypertension, uncontrolled   - control pain   - On norvasc and arb   - Add coreg    Macrocytosis   - No anemia   - Check B12, folate     Hyperlipidemia  Major depression   Chronic back pain   Chronic neck pain   Psoriasis   PTSD  - continue home meds      #BMI (Calculated): 30.12    I have personally reviewed the radiographs, laboratory data in Epic and decisions and statements above are based partially on this personal interpretation.                 Care Plan discussed with: Patient, Care Manager, Nursing Staff, and Consultant/Specialist    Discussed:  Care Plan and D/C Planning    Prophylaxis:  SCD's    Disposition:  Will obtain PT/OT eval     Total time spent with patient care: 40 Minutes **I personally saw and examined the patient during this time period**           ___________________________________________________    Attending Physician: Steve colon MD        Subjective:     Chief Complaint:  Continues to have pain, on and off. He does think he is a little better than when he came in. 
  Physician Progress Note      PATIENT:               JUAN PABLO  CSN #:                  [id number]  :                       1945  ADMIT DATE:       2024 11:00 PM  DISCH DATE:  RESPONDING  PROVIDER #:        Steve Ayala MD          QUERY TEXT:    Good morning  Patient admitted with Gallstone pancreatitis.  Documentation reflects PABLO  in H&P on .    If possible, please document in the progress notes and discharge summary if   PABLO was:    The medical record reflects the following:  Risk Factors: age, gallstone pancreatitis, HTN  Clinical Indicators: patient presented with abdominal pain radiating to back,   associated with nausea and diaphoresis  CR trend -1.35>>>-1.13>>.-0.8>>>-0.61   H&P \"To consider renal ultrasound and nephrology consult if PABLO does not   improve with current interventions\"  Treatment: daily labs, IVF bolus    Thank you  Nuria Lyn RN [facility]  [phone]  Options provided:  -- PABLO confirmed after study  -- PABLO ruled out after study  -- Other - I will add my own diagnosis  -- Disagree - Not applicable / Not valid  -- Disagree - Clinically unable to determine / Unknown  -- Refer to Clinical Documentation Reviewer    PROVIDER RESPONSE TEXT:    PABLO confirmed after study.    Query created by: Nuria Lyn on 12/3/2024 9:18 AM      Electronically signed by:  Steve Ayala MD 12/3/2024 11:28 AM          
[id number]    
Assessment / Plan:   Biliary pancreatitis, improving.    OR tomorrow for cholecystectomy, does not appear to need intraoperative cholangiogram.    Edward Bo MD  [facility]  Office:  [phone]  Fax:  [phone]        General Surgery Daily Progress Note      Patient: Lorenzo Johnson MRN: [id number]  SSN: [id number]    YOB: 1945  Age: 79 y.o.  Sex: male      Admit Date: 11/28/2024 for above    Subjective:   Patient tolerating some oral intake.    Current Facility-Administered Medications   Medication Dose Route Frequency    HYDROmorphone HCl PF (DILAUDID) injection 0.5 mg  0.5 mg IntraVENous Q3H PRN    carvedilol (COREG) tablet 3.125 mg  3.125 mg Oral BID WC    DULoxetine (CYMBALTA) extended release capsule 30 mg  30 mg Oral Daily    0.9 % sodium chloride infusion   IntraVENous Continuous    prochlorperazine (COMPAZINE) injection 10 mg  10 mg IntraVENous Q6H PRN    sodium chloride flush 0.9 % injection 5-40 mL  5-40 mL IntraVENous 2 times per day    sodium chloride flush 0.9 % injection 5-40 mL  5-40 mL IntraVENous PRN    0.9 % sodium chloride infusion   IntraVENous PRN    potassium chloride (KLOR-CON) extended release tablet 40 mEq  40 mEq Oral PRN    Or    potassium bicarb-citric acid (EFFER-K) effervescent tablet 40 mEq  40 mEq Oral PRN    Or    potassium chloride 10 mEq/100 mL IVPB (Peripheral Line)  10 mEq IntraVENous PRN    magnesium sulfate 2000 mg in 50 mL IVPB premix  2,000 mg IntraVENous PRN    acetaminophen (TYLENOL) tablet 650 mg  650 mg Oral Q6H PRN    Or    acetaminophen (TYLENOL) suppository 650 mg  650 mg Rectal Q6H PRN    melatonin tablet 6 mg  6 mg Oral Nightly PRN    pantoprazole (PROTONIX) 40 mg in sodium chloride (PF) 0.9 % 10 mL injection  40 mg IntraVENous Daily    hydrALAZINE (APRESOLINE) injection 5 mg  5 mg IntraVENous Q6H PRN    amLODIPine (NORVASC) tablet 5 mg  5 mg Oral Daily    losartan (COZAAR) tablet 100 mg  100 mg Oral Daily        Objective: 
[facility]  BENJI Singh  [phone]           GI PROGRESS NOTE        NAME: Lorenzo Johnson   :  1945   MRN:  [id number]       Subjective:   Pancreatitis      Objective:   Feeling better. Minimal pain. Good urine output.       VITALS:   Last 24hrs VS reviewed since prior progress note. Most recent are:  Vitals:    24 1312   BP:    Pulse:    Resp: 20   Temp:    SpO2:        Intake/Output Summary (Last 24 hours) at 2024 1530  Last data filed at 2024 0834  Gross per 24 hour   Intake 4709.92 ml   Output 3665 ml   Net 1044.92 ml       PHYSICAL EXAM:  General: Alert, in no acute distress    HEENT: Anicteric sclerae.  Lungs:            CTA Bilaterally.   Heart:  Regular  rhythm,    Abdomen: Soft, Non distended, Non tender.  (+)Bowel sounds, no HSM  Extremities: No c/c/e  Neurologic:  CN 2-12 gi, Alert and oriented X 3.  No acute neurological distress   Psych:   Good insight. Not anxious nor agitated.    Lab Data Reviewed:   Recent Labs     24  0450 24  0541   WBC 10.5 9.9   HGB 12.8 14.0   HCT 38.4 42.0    248     Recent Labs     24  0430 24  0450 24  0541    137 137   K 3.4* 3.7 3.3*    107 107   CO2  22 24   BUN 14 13 8   PHOS 2.9  --   --      Recent Labs     24  0450 24  0541   GLOB 3.0 4.2*       ________________________________________________________________________  Patient Active Problem List   Diagnosis    Psoriasiform dermatitis    Low back pain radiating to both legs    Chronic back pain    Essential hypertension    PTSD (post-traumatic stress disorder)    Seborrheic dermatitis    Arthritis    Depression, major    Narcotic dependence (HCC)    Moderate episode of recurrent major depressive disorder (HCC)    Pure hypercholesterolemia    Acute pancreatitis without infection or necrosis         Assessment and Plan:  Pancreatitis presumed gallstone in origin - clinically doing well, minimal pain on 
Nurse handed patient a copy of discharge instructions which have been read and explained to estela. New medications read and explained. Patient verbalized understanding. Patient aware that precriptions have been eletronically sent to pharmacy. Opportunity for questions and clarification offered.Removed patients IV access with no complications,VS stable, and patient sent with all belongings.   
Patient has been seen and examined today with Dr. Cortez. Note to follow - gen surg rounds in progress. No OR time today. Tentative plan for outpatient lap diego with hernia repair. Timing is pending availability. Clears okay for now.     Ayanna Michaud PA-C  
Physical therapy services attempted 2:49PM. Per cursory review of medical chart, Pt currently pending dc from [facility] with no anticipated/recommended PT needs (per most recent PT Team note).     Marleny Hauser, PT, DPT   
SURGERY PROGRESS NOTE      Admit Date: 2024    POD * Surgery Date in Future *    Procedure: Procedure(s):  HERNIA UMBILICAL REPAIR      Subjective:   Patient seen and examined with Dr. Cortez. Doing okay. Getting better.       Objective:     BP (!) 147/87   Pulse 73   Temp 98.6 °F (37 °C) (Oral)   Resp 20   Ht 1.676 m (5' 6\")   Wt 84.6 kg (186 lb 8.2 oz)   SpO2 96%   BMI 30.10 kg/m²      Temp (24hrs), Av.7 °F (37.1 °C), Min:97.9 °F (36.6 °C), Max:99.9 °F (37.7 °C)      Physical Exam:     Abdomen:  Soft.  Umbilical hernia.    Assessment:     Principal Problem:    Acute pancreatitis without infection or necrosis  Resolved Problems:    * No resolved hospital problems. *      Plan/Recommendations/Medical Decision Making:   Clears for now  Tentative plan for scheduling him for outpatient cholecystectomy and hernia repair    Likely next week due to no OR availability  Discussed with patient and he agrees      Ayanna Michaud PA-C      
SURGERY PROGRESS NOTE      Admit Date: 2024    POD * Surgery Date in Future *    Procedure: Procedure(s):  HERNIA UMBILICAL REPAIR      Subjective:   Patient seen and examined. Doing okay. Wants to go home.      Objective:     /67   Pulse 72   Temp 99.5 °F (37.5 °C) (Oral)   Resp 17   Ht 1.676 m (5' 6\")   Wt 84.6 kg (186 lb 8.2 oz)   SpO2 94%   BMI 30.10 kg/m²      Temp (24hrs), Av.9 °F (37.2 °C), Min:98.4 °F (36.9 °C), Max:99.5 °F (37.5 °C)      Physical Exam:     Abdomen:  Soft. Non tender. Umbilical hernia.    Assessment:     Principal Problem:    Acute pancreatitis without infection or necrosis  Resolved Problems:    * No resolved hospital problems. *      Plan/Recommendations/Medical Decision Making:   Surgery is scheduled outpatient for 24  Okay for home when medically cleared  Patient understands and agrees to the plan   Call if further questions arise     Ayanna Michaud PA-C      
Wound care consult placed for further eval of redness and psorasis patches(red,dry,and flaky) on lower back and sacrum,no skin breakdown noted,  
     Right lower leg: No edema.      Left lower leg: No edema.   Skin:     General: Skin is warm and dry.      Comments: Umbilical hernia, tender but easily reproducible.  No overlying skin changes    Neurological:      General: No focal deficit present.   Psychiatric:         Mood and Affect: Mood normal.           Medications Reviewed: (see below)    Lab Data Reviewed: (see below)    ______________________________________________________________________    Medications:     Current Facility-Administered Medications   Medication Dose Route Frequency    carvedilol (COREG) tablet 3.125 mg  3.125 mg Oral BID WC    DULoxetine (CYMBALTA) extended release capsule 30 mg  30 mg Oral Daily    0.9 % sodium chloride infusion   IntraVENous Continuous    prochlorperazine (COMPAZINE) injection 10 mg  10 mg IntraVENous Q6H PRN    sodium chloride flush 0.9 % injection 5-40 mL  5-40 mL IntraVENous 2 times per day    sodium chloride flush 0.9 % injection 5-40 mL  5-40 mL IntraVENous PRN    0.9 % sodium chloride infusion   IntraVENous PRN    potassium chloride (KLOR-CON) extended release tablet 40 mEq  40 mEq Oral PRN    Or    potassium bicarb-citric acid (EFFER-K) effervescent tablet 40 mEq  40 mEq Oral PRN    Or    potassium chloride 10 mEq/100 mL IVPB (Peripheral Line)  10 mEq IntraVENous PRN    magnesium sulfate 2000 mg in 50 mL IVPB premix  2,000 mg IntraVENous PRN    acetaminophen (TYLENOL) tablet 650 mg  650 mg Oral Q6H PRN    Or    acetaminophen (TYLENOL) suppository 650 mg  650 mg Rectal Q6H PRN    melatonin tablet 6 mg  6 mg Oral Nightly PRN    pantoprazole (PROTONIX) 40 mg in sodium chloride (PF) 0.9 % 10 mL injection  40 mg IntraVENous Daily    hydrALAZINE (APRESOLINE) injection 5 mg  5 mg IntraVENous Q6H PRN    amLODIPine (NORVASC) tablet 5 mg  5 mg Oral Daily    morphine (PF) injection 2 mg  2 mg IntraVENous Q2H PRN    Or    morphine sulfate (PF) injection 4 mg  4 mg IntraVENous Q2H PRN    losartan (COZAAR) tablet 100 mg 
sounds: No wheezing or rales.   Abdominal:      General: Abdomen is flat. Bowel sounds are normal. There is no distension.      Palpations: Abdomen is soft.      Tenderness: There is abdominal tenderness (epigastric & over umbilical hernia). There is no guarding or rebound.   Musculoskeletal:      Right lower leg: No edema.      Left lower leg: No edema.   Skin:     General: Skin is warm and dry.      Comments: Umbilical hernia, tender but easily reproducible.  No overlying skin changes    Neurological:      General: No focal deficit present.   Psychiatric:         Mood and Affect: Mood normal.           Medications Reviewed: (see below)    Lab Data Reviewed: (see below)    ______________________________________________________________________    Medications:     Current Facility-Administered Medications   Medication Dose Route Frequency    HYDROmorphone HCl PF (DILAUDID) injection 0.5 mg  0.5 mg IntraVENous Q3H PRN    carvedilol (COREG) tablet 3.125 mg  3.125 mg Oral BID WC    DULoxetine (CYMBALTA) extended release capsule 30 mg  30 mg Oral Daily    0.9 % sodium chloride infusion   IntraVENous Continuous    prochlorperazine (COMPAZINE) injection 10 mg  10 mg IntraVENous Q6H PRN    sodium chloride flush 0.9 % injection 5-40 mL  5-40 mL IntraVENous 2 times per day    sodium chloride flush 0.9 % injection 5-40 mL  5-40 mL IntraVENous PRN    0.9 % sodium chloride infusion   IntraVENous PRN    potassium chloride (KLOR-CON) extended release tablet 40 mEq  40 mEq Oral PRN    Or    potassium bicarb-citric acid (EFFER-K) effervescent tablet 40 mEq  40 mEq Oral PRN    Or    potassium chloride 10 mEq/100 mL IVPB (Peripheral Line)  10 mEq IntraVENous PRN    magnesium sulfate 2000 mg in 50 mL IVPB premix  2,000 mg IntraVENous PRN    acetaminophen (TYLENOL) tablet 650 mg  650 mg Oral Q6H PRN    Or    acetaminophen (TYLENOL) suppository 650 mg  650 mg Rectal Q6H PRN    melatonin tablet 6 mg  6 mg Oral Nightly PRN    pantoprazole 
agitation      Vital Signs:    Last 24hrs VS reviewed since prior progress note. Most recent are:  Vitals:    11/29/24 1445   BP:    Pulse:    Resp: 16   Temp:    SpO2:          Intake/Output Summary (Last 24 hours) at 11/29/2024 1507  Last data filed at 11/29/2024 1214  Gross per 24 hour   Intake --   Output 250 ml   Net -250 ml        Physical Examination:     I had a face to face encounter with this patient and independently examined them on 11/29/2024 as outlined below:          General : alert x 3, awake, no acute distress,   HEENT: PEERL, EOMI, moist mucus membrane  Neck: supple, no JVD  Chest: Clear to auscultation bilaterally   CVS: S1 S2 heard, Capillary refill less than 2 seconds  Abd: soft, tender to palpation in epigastrium, non distended, BS physiological,   Ext: no clubbing, no cyanosis, no edema, brisk 2+ DP pulses  Neuro/Psych: pleasant mood and affect, moving all extremities spontaneously, no focal neurological deficit  Skin: warm     Data Review:    Review and/or order of clinical lab test  Review and/or order of tests in the radiology section of CPT  Review and/or order of tests in the medicine section of CPT  Discussion of test results with performing physician  I personally reviewed  Image and EKG/Monitor Tracing      I have personally and independently reviewed all pertinent labs, diagnostic studies, imaging, and have provided independent interpretation of the same.     Labs:     Recent Labs     11/28/24 2330 11/29/24  0524   WBC 21.4* 21.4*   HGB 16.3 14.0   HCT 47.6 41.5    213     Recent Labs     11/28/24 2330 11/29/24  1240    138   K 2.9* 3.4*    106   CO2 26 23   BUN 12 17   MG  --  1.9   PHOS  --  3.3     Recent Labs     11/28/24 2330   ALT 22   GLOB 3.9     No results for input(s): \"INR\", \"APTT\" in the last 72 hours.    Invalid input(s): \"PTP\"   No results for input(s): \"TIBC\" in the last 72 hours.    Invalid input(s): \"FE\", \"PSAT\", \"FERR\"   No results found for:

## 2024-12-03 NOTE — TELEPHONE ENCOUNTER
Spoke with Anai from [facility] regarding if the provider will sign and follow home health PT, OT orders for the patient. [phone]. Dr. Stevens notified and approval given. Patient S/P Cholecystectomy. Discharged today. Grateful for the call.

## 2024-12-03 NOTE — DISCHARGE SUMMARY
Hospitalist Discharge Summary     Patient ID:  Lorenzo Johnson  [id number]  79 y.o.  1945    Admit date: 11/28/2024    Discharge date and time: 12/3/2024    Admission Diagnoses: Hypokalemia [E87.6]  Idiopathic acute pancreatitis without infection or necrosis [K85.00]  Acute pancreatitis without infection or necrosis [K85.90]    Discharge Diagnoses:    Principal Problem:    Acute pancreatitis without infection or necrosis  Resolved Problems:    * No resolved hospital problems. *         Hospital Course:       78 y.o. male with a medical history significant forarthritis, chronic back pain, psoriasis, PTSD, chronic neck pain, status post lumbar laminectomy, who presents to the ER with abdominal pain.      Abdominal pain d/t Acute pancreatitis   Dilated CBD  - Noted on CT abdomen pelvis, US abdomen showed dilated CBD.  Pancreatitis likely biliary etiology.  MRCP showed acute pancreatitis & biliary ductal dilatation without choledocholithiasis  - GI following, recommending surgery eval for cholecystectomy  - Gen surg consulted, planning for laparoscopic cholecystectomy - Now scheduled OP 12/11/24. Surgery signed off   - Tolerating diet. Pain improved.  - Evaluated by PT/OT with no skilled needs, DC with HH. Discussed with patient and daughter who are agreeable.   - Will provide short course of zofran. Continue home oxycodone PRN      Umbilical hernia   - Tender to palpation, soft and reproducible   - Gen surg following, plan to repair during laparoscopic cholecystectomy 12/11/24     Hypokalemia  - Potassium 2.9 on admission.  Likely due to decreased intake.  - Resolved prior to DC       Hypocalcemia  - Corrected calcium 8.6  - Replete     Leukocytosis   - D/t pancreatitis, resolved without abx     Hypertension, uncontrolled   - continue home arb   - added norvasc   - pain control as above      Macrocytosis   - No anemia   - Normal B12/folate      Hyperlipidemia  Major depression   Chronic back pain   Chronic neck
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
## 2024-12-05 LAB
BACTERIA SPEC CULT: NORMAL
BACTERIA SPEC CULT: NORMAL
SERVICE CMNT-IMP: NORMAL
SERVICE CMNT-IMP: NORMAL

## 2024-12-06 NOTE — TELEPHONE ENCOUNTER
EP Catheter inserted. Medication refills:    oxyCODONE (OXYCONTIN) 80 MG extended release tablet   oxyCODONE (OXY-IR) 30 MG immediate release tablet     Patient will be out on Monday    [facility]/pharmacy #[id number] - [place], [place] - [address] - P [phone] - F [phone] [address] [facility] [address]  Phone: [phone]  Fax: [phone]      normal...

## 2024-12-09 ENCOUNTER — OFFICE VISIT (OUTPATIENT)
Facility: CLINIC | Age: 79
End: 2024-12-09

## 2024-12-09 VITALS
HEART RATE: 79 BPM | HEIGHT: 66 IN | DIASTOLIC BLOOD PRESSURE: 69 MMHG | WEIGHT: 187 LBS | OXYGEN SATURATION: 95 % | SYSTOLIC BLOOD PRESSURE: 104 MMHG | RESPIRATION RATE: 18 BRPM | BODY MASS INDEX: 30.05 KG/M2 | TEMPERATURE: 97.6 F

## 2024-12-09 DIAGNOSIS — I10 ESSENTIAL HYPERTENSION: ICD-10-CM

## 2024-12-09 DIAGNOSIS — F11.20 NARCOTIC DEPENDENCE (HCC): ICD-10-CM

## 2024-12-09 DIAGNOSIS — G89.4 CHRONIC PAIN SYNDROME: ICD-10-CM

## 2024-12-09 DIAGNOSIS — K85.00 IDIOPATHIC ACUTE PANCREATITIS WITHOUT INFECTION OR NECROSIS: Primary | ICD-10-CM

## 2024-12-09 DIAGNOSIS — K83.8 DILATION OF COMMON BILE DUCT: ICD-10-CM

## 2024-12-09 DIAGNOSIS — K42.9 UMBILICAL HERNIA WITHOUT OBSTRUCTION AND WITHOUT GANGRENE: ICD-10-CM

## 2024-12-09 RX ORDER — OXYCODONE HYDROCHLORIDE 80 MG/1
80 TABLET, FILM COATED, EXTENDED RELEASE ORAL EVERY 12 HOURS
Qty: 60 EACH | Refills: 0 | Status: SHIPPED | OUTPATIENT
Start: 2024-12-09 | End: 2025-01-08

## 2024-12-09 NOTE — PROGRESS NOTES
HISTORY OF PRESENT ILLNESS    Chief Complaint   Patient presents with    Medication Check       Presents for Transitional care management (TCM) visit s/p of hospital admission from 11/28/24 until 12/3/2024 at [facility].    Nurse Navigator or other outreach call noted to patient and documented in [facility] on 12/4/24    Hospital record and relevant lab results, test results and consult notes have personally been reviewed by me at this office visit.   Medications reviewed and reconciled.       Patient was hospitalized for abdominal pain.      Abdominal pain d/t Acute pancreatitis   Dilated CBD  Lipase 210 on admission  - Noted on CT abdomen pelvis, US abdomen showed dilated CBD.  Pancreatitis likely biliary etiology.  MRCP showed acute pancreatitis & biliary ductal dilatation without choledocholithiasis  - GI following, recommending surgery eval for cholecystectomy  - Gen surg consulted, planning for laparoscopic cholecystectomy - Now scheduled OP 12/11/24. Surgery signed off   - Evaluated by PT/OT with no skilled needs, DC with HH. Discussed with patient and daughter who are agreeable.   - Will provide short course of zofran. Continue home oxycodone PRN   Umbilical hernia   - Tender to palpation, soft and reproducible   - Gen surg following, plan to repair during laparoscopic cholecystectomy 12/11/24  Hypokalemia  - Potassium 2.9 on admission.  Likely due to decreased intake.  - Resolved prior to DC      Today, patient says he is doing well and would like to cancel his surgery.  Says he never had any significant pain in his epigastric region or right upper quadrant.  Says that his abdominal symptoms were secondary to withdrawal from OxyContin because it was not refilled.  Patient was not refilled due to concern about a phone call placed by his daughterElida 11/14/24 in which he said he was agitated, cursing, angry.  Unclear etiology.  Attempts to contact him were not answered.   shows last

## 2024-12-09 NOTE — PROGRESS NOTES
Room:  I have reviewed all needed documentation in preparation for visit. Verified patient by name and date of birth  Chief Complaint   Patient presents with    Medication Check       Vitals:    12/09/24 1259   BP: 104/69   Pulse: 79   Resp: 18   Temp: 97.6 °F (36.4 °C)   TempSrc: Temporal   SpO2: 95%   Weight: 84.8 kg (187 lb)   Height: 1.676 m (5' 6\")        Health Maintenance Due   Topic Date Due    Shingles vaccine (1 of 2) Never done    Pneumococcal 65+ years Vaccine (2 of 2 - PCV) 08/12/2014    Respiratory Syncytial Virus (RSV) Pregnant or age 60 yrs+ (1 - 1-dose 75+ series) Never done    DTaP/Tdap/Td vaccine (2 - Td or Tdap) 08/12/2023    Annual Wellness Visit (Medicare Advantage)  01/01/2024    Flu vaccine (1) 08/01/2024    COVID-19 Vaccine (3 - 2023-24 season) 09/01/2024        1. \"Have you been to the ER, urgent care clinic since your last visit?  Hospitalized since your last visit?\" Yes 11/2029 for abdominal pain     2. \"Have you seen or consulted any other health care providers outside of the [facility] since your last visit?\" No

## 2024-12-10 ENCOUNTER — ANESTHESIA EVENT (OUTPATIENT)
Dept: OPERATING ROOM | Age: 79
End: 2024-12-10

## 2024-12-11 ENCOUNTER — ANESTHESIA (OUTPATIENT)
Dept: OPERATING ROOM | Age: 79
End: 2024-12-11

## 2024-12-26 ENCOUNTER — TELEPHONE (OUTPATIENT)
Facility: CLINIC | Age: 79
End: 2024-12-26

## 2024-12-26 DIAGNOSIS — F11.20 OPIOID DEPENDENCE, UNCOMPLICATED (HCC): ICD-10-CM

## 2024-12-26 DIAGNOSIS — G89.4 CHRONIC PAIN SYNDROME: ICD-10-CM

## 2024-12-26 RX ORDER — OXYCODONE HYDROCHLORIDE 30 MG/1
30 TABLET ORAL
Qty: 120 TABLET | Refills: 0 | Status: SHIPPED | OUTPATIENT
Start: 2024-12-26 | End: 2025-01-25

## 2024-12-26 RX ORDER — OXYCODONE HYDROCHLORIDE 30 MG/1
30 TABLET ORAL
Qty: 120 TABLET | Refills: 0 | Status: CANCELLED | OUTPATIENT
Start: 2024-12-26 | End: 2025-01-25

## 2024-12-26 NOTE — TELEPHONE ENCOUNTER
The patient is requesting a Rx refill on the medication listed below:    oxyCODONE (OXY-IR) 30 MG immediate release tablet     [facility]/pharmacy #[id number] - [place], [place] - [address] - P [phone] - F [phone]

## 2025-01-08 ENCOUNTER — TELEPHONE (OUTPATIENT)
Facility: CLINIC | Age: 80
End: 2025-01-08

## 2025-01-08 DIAGNOSIS — F11.20 NARCOTIC DEPENDENCE (HCC): ICD-10-CM

## 2025-01-08 DIAGNOSIS — G89.4 CHRONIC PAIN SYNDROME: ICD-10-CM

## 2025-01-08 RX ORDER — OXYCODONE HYDROCHLORIDE 80 MG/1
80 TABLET, FILM COATED, EXTENDED RELEASE ORAL EVERY 12 HOURS
Qty: 60 EACH | Refills: 0 | Status: SHIPPED | OUTPATIENT
Start: 2025-01-08 | End: 2025-02-07

## 2025-01-08 NOTE — TELEPHONE ENCOUNTER
The patient is requesting a Rx refill on the medication listed below:   OXYCONTIN 80 MG extended release tablet   [facility]/pharmacy #[id number] - [place], [place] - [address] - LEIGH [phone] - F [phone]

## 2025-01-13 ENCOUNTER — TELEPHONE (OUTPATIENT)
Facility: CLINIC | Age: 80
End: 2025-01-13

## 2025-01-13 DIAGNOSIS — F11.20 NARCOTIC DEPENDENCE (HCC): ICD-10-CM

## 2025-01-13 DIAGNOSIS — G89.4 CHRONIC PAIN SYNDROME: ICD-10-CM

## 2025-01-13 RX ORDER — OXYCODONE HYDROCHLORIDE 80 MG/1
80 TABLET, FILM COATED, EXTENDED RELEASE ORAL EVERY 12 HOURS
Qty: 60 EACH | Refills: 0 | Status: SHIPPED | OUTPATIENT
Start: 2025-01-13 | End: 2025-01-16 | Stop reason: SDUPTHER

## 2025-01-13 NOTE — TELEPHONE ENCOUNTER
RTC to patient regarding his medication is not available at his usual pharmacy. Advised patient to let us know which pharmacy to send it to. No answer and LVM.

## 2025-01-13 NOTE — TELEPHONE ENCOUNTER
Patient calling for medication refill:    OXYCONTIN 80 MG extended release tablet     Patient states he has not gotten this medication and Pharmacy states this is not in stock

## 2025-01-16 ENCOUNTER — HOSPITAL ENCOUNTER (EMERGENCY)
Facility: HOSPITAL | Age: 80
Discharge: HOME | End: 2025-01-16
Attending: EMERGENCY MEDICINE
Payer: MEDICARE

## 2025-01-16 ENCOUNTER — APPOINTMENT (OUTPATIENT)
Facility: HOSPITAL | Age: 80
End: 2025-01-16
Payer: MEDICARE

## 2025-01-16 VITALS
DIASTOLIC BLOOD PRESSURE: 98 MMHG | TEMPERATURE: 97.7 F | BODY MASS INDEX: 28.93 KG/M2 | WEIGHT: 180 LBS | OXYGEN SATURATION: 96 % | RESPIRATION RATE: 18 BRPM | HEIGHT: 66 IN | SYSTOLIC BLOOD PRESSURE: 190 MMHG | HEART RATE: 111 BPM

## 2025-01-16 DIAGNOSIS — F11.20 NARCOTIC DEPENDENCE (HCC): ICD-10-CM

## 2025-01-16 DIAGNOSIS — G89.4 CHRONIC PAIN SYNDROME: ICD-10-CM

## 2025-01-16 DIAGNOSIS — B34.9 VIRAL SYNDROME: Primary | ICD-10-CM

## 2025-01-16 LAB
ALBUMIN SERPL-MCNC: 4.1 G/DL (ref 3.5–5)
ALBUMIN/GLOB SERPL: 1.1 (ref 1.1–2.2)
ALP SERPL-CCNC: 156 U/L (ref 45–117)
ALT SERPL-CCNC: 24 U/L (ref 12–78)
AMORPH CRY URNS QL MICRO: ABNORMAL
ANION GAP SERPL CALC-SCNC: 14 MMOL/L (ref 2–12)
APPEARANCE UR: ABNORMAL
AST SERPL-CCNC: 27 U/L (ref 15–37)
BACTERIA URNS QL MICRO: NEGATIVE /HPF
BASOPHILS # BLD: 0.02 K/UL (ref 0–0.1)
BASOPHILS NFR BLD: 0.2 % (ref 0–1)
BILIRUB SERPL-MCNC: 1.5 MG/DL (ref 0.2–1)
BILIRUB UR QL CFM: NEGATIVE
BUN SERPL-MCNC: 7 MG/DL (ref 6–20)
BUN/CREAT SERPL: 7 (ref 12–20)
CALCIUM SERPL-MCNC: 9.1 MG/DL (ref 8.5–10.1)
CHLORIDE SERPL-SCNC: 98 MMOL/L (ref 97–108)
CO2 SERPL-SCNC: 27 MMOL/L (ref 21–32)
COLOR UR: ABNORMAL
CREAT SERPL-MCNC: 0.97 MG/DL (ref 0.7–1.3)
DIFFERENTIAL METHOD BLD: ABNORMAL
EOSINOPHIL # BLD: 0.02 K/UL (ref 0–0.4)
EOSINOPHIL NFR BLD: 0.2 % (ref 0–7)
EPITH CASTS URNS QL MICRO: ABNORMAL /LPF
ERYTHROCYTE [DISTWIDTH] IN BLOOD BY AUTOMATED COUNT: 14 % (ref 11.5–14.5)
GLOBULIN SER CALC-MCNC: 3.7 G/DL (ref 2–4)
GLUCOSE SERPL-MCNC: 147 MG/DL (ref 65–100)
GLUCOSE UR STRIP.AUTO-MCNC: NEGATIVE MG/DL
HCT VFR BLD AUTO: 45.7 % (ref 36.6–50.3)
HGB BLD-MCNC: 15.9 G/DL (ref 12.1–17)
HGB UR QL STRIP: ABNORMAL
IMM GRANULOCYTES # BLD AUTO: 0.05 K/UL (ref 0–0.04)
IMM GRANULOCYTES NFR BLD AUTO: 0.6 % (ref 0–0.5)
KETONES UR QL STRIP.AUTO: 40 MG/DL
LEUKOCYTE ESTERASE UR QL STRIP.AUTO: NEGATIVE
LYMPHOCYTES # BLD: 1.15 K/UL (ref 0.8–3.5)
LYMPHOCYTES NFR BLD: 12.8 % (ref 12–49)
MAGNESIUM SERPL-MCNC: 2 MG/DL (ref 1.6–2.4)
MCH RBC QN AUTO: 32.6 PG (ref 26–34)
MCHC RBC AUTO-ENTMCNC: 34.8 G/DL (ref 30–36.5)
MCV RBC AUTO: 93.8 FL (ref 80–99)
MONOCYTES # BLD: 0.73 K/UL (ref 0–1)
MONOCYTES NFR BLD: 8.1 % (ref 5–13)
NEUTS SEG # BLD: 7 K/UL (ref 1.8–8)
NEUTS SEG NFR BLD: 78.1 % (ref 32–75)
NITRITE UR QL STRIP.AUTO: NEGATIVE
NRBC # BLD: 0 K/UL (ref 0–0.01)
NRBC BLD-RTO: 0 PER 100 WBC
PH UR STRIP: 6 (ref 5–8)
PLATELET # BLD AUTO: 319 K/UL (ref 150–400)
PMV BLD AUTO: 9.3 FL (ref 8.9–12.9)
POTASSIUM SERPL-SCNC: 3.5 MMOL/L (ref 3.5–5.1)
PROT SERPL-MCNC: 7.8 G/DL (ref 6.4–8.2)
PROT UR STRIP-MCNC: 100 MG/DL
RBC # BLD AUTO: 4.87 M/UL (ref 4.1–5.7)
RBC #/AREA URNS HPF: ABNORMAL /HPF (ref 0–5)
SODIUM SERPL-SCNC: 139 MMOL/L (ref 136–145)
SP GR UR REFRACTOMETRY: 1.02 (ref 1–1.03)
TROPONIN I SERPL HS-MCNC: 24 NG/L (ref 0–76)
URINE CULTURE IF INDICATED: ABNORMAL
UROBILINOGEN UR QL STRIP.AUTO: 0.2 EU/DL (ref 0.2–1)
WBC # BLD AUTO: 9 K/UL (ref 4.1–11.1)
WBC URNS QL MICRO: ABNORMAL /HPF (ref 0–4)

## 2025-01-16 PROCEDURE — 80053 COMPREHEN METABOLIC PANEL: CPT

## 2025-01-16 PROCEDURE — 85025 COMPLETE CBC W/AUTO DIFF WBC: CPT

## 2025-01-16 PROCEDURE — 96374 THER/PROPH/DIAG INJ IV PUSH: CPT

## 2025-01-16 PROCEDURE — 36415 COLL VENOUS BLD VENIPUNCTURE: CPT

## 2025-01-16 PROCEDURE — 84484 ASSAY OF TROPONIN QUANT: CPT

## 2025-01-16 PROCEDURE — 6360000002 HC RX W HCPCS: Performed by: EMERGENCY MEDICINE

## 2025-01-16 PROCEDURE — 6370000000 HC RX 637 (ALT 250 FOR IP): Performed by: EMERGENCY MEDICINE

## 2025-01-16 PROCEDURE — 81001 URINALYSIS AUTO W/SCOPE: CPT

## 2025-01-16 PROCEDURE — 99284 EMERGENCY DEPT VISIT MOD MDM: CPT

## 2025-01-16 PROCEDURE — 70450 CT HEAD/BRAIN W/O DYE: CPT

## 2025-01-16 PROCEDURE — 96372 THER/PROPH/DIAG INJ SC/IM: CPT

## 2025-01-16 PROCEDURE — 83735 ASSAY OF MAGNESIUM: CPT

## 2025-01-16 PROCEDURE — 2580000003 HC RX 258: Performed by: EMERGENCY MEDICINE

## 2025-01-16 RX ORDER — 0.9 % SODIUM CHLORIDE 0.9 %
1000 INTRAVENOUS SOLUTION INTRAVENOUS ONCE
Status: COMPLETED | OUTPATIENT
Start: 2025-01-16 | End: 2025-01-16

## 2025-01-16 RX ORDER — ONDANSETRON 4 MG/1
4 TABLET, ORALLY DISINTEGRATING ORAL 3 TIMES DAILY PRN
Qty: 21 TABLET | Refills: 0 | Status: SHIPPED | OUTPATIENT
Start: 2025-01-16

## 2025-01-16 RX ORDER — ACETAMINOPHEN 500 MG
1000 TABLET ORAL
Status: COMPLETED | OUTPATIENT
Start: 2025-01-16 | End: 2025-01-16

## 2025-01-16 RX ORDER — OXYCODONE HYDROCHLORIDE 80 MG/1
80 TABLET, FILM COATED, EXTENDED RELEASE ORAL EVERY 12 HOURS
Qty: 21 EACH | Refills: 0 | Status: SHIPPED | OUTPATIENT
Start: 2025-01-16 | End: 2025-01-27

## 2025-01-16 RX ORDER — ONDANSETRON 2 MG/ML
4 INJECTION INTRAMUSCULAR; INTRAVENOUS ONCE
Status: COMPLETED | OUTPATIENT
Start: 2025-01-16 | End: 2025-01-16

## 2025-01-16 RX ORDER — HYDRALAZINE HYDROCHLORIDE 20 MG/ML
10 INJECTION INTRAMUSCULAR; INTRAVENOUS ONCE
Status: COMPLETED | OUTPATIENT
Start: 2025-01-16 | End: 2025-01-16

## 2025-01-16 RX ADMIN — ONDANSETRON 4 MG: 2 INJECTION INTRAMUSCULAR; INTRAVENOUS at 19:37

## 2025-01-16 RX ADMIN — SODIUM CHLORIDE 1000 ML: 9 INJECTION, SOLUTION INTRAVENOUS at 19:37

## 2025-01-16 RX ADMIN — ACETAMINOPHEN 1000 MG: 500 TABLET ORAL at 20:07

## 2025-01-16 RX ADMIN — HYDRALAZINE HYDROCHLORIDE 10 MG: 20 INJECTION, SOLUTION INTRAMUSCULAR; INTRAVENOUS at 19:39

## 2025-01-16 ASSESSMENT — ENCOUNTER SYMPTOMS
ABDOMINAL PAIN: 0
SHORTNESS OF BREATH: 0
NAUSEA: 1
DIARRHEA: 0
VOMITING: 1
CONSTIPATION: 0
COLOR CHANGE: 0
BACK PAIN: 0

## 2025-01-16 ASSESSMENT — PAIN DESCRIPTION - PAIN TYPE
TYPE: CHRONIC PAIN;ACUTE PAIN
TYPE: ACUTE PAIN;CHRONIC PAIN

## 2025-01-16 ASSESSMENT — PAIN DESCRIPTION - DESCRIPTORS
DESCRIPTORS: ACHING

## 2025-01-16 ASSESSMENT — PAIN SCALES - GENERAL
PAINLEVEL_OUTOF10: 9
PAINLEVEL_OUTOF10: 8

## 2025-01-16 ASSESSMENT — PAIN DESCRIPTION - LOCATION
LOCATION: ABDOMEN
LOCATION: ABDOMEN;LEG;HEAD
LOCATION: HEAD;LEG;ABDOMEN
LOCATION: ABDOMEN;HEAD;LEG

## 2025-01-16 ASSESSMENT — PAIN - FUNCTIONAL ASSESSMENT: PAIN_FUNCTIONAL_ASSESSMENT: 0-10

## 2025-01-16 NOTE — TELEPHONE ENCOUNTER
Spoke with patient and he reports he has not heard from the pharmacy for his OxyContin 80 mg. [facility] report [facility] and that no [facility] with in a 40 [facility] radius has it at this time. Called [facility] and they reports they do not have it but the [facility] [address] has some. Called them and they have 21 pills. Advised patient that I would have Dr. Stevens send script to them. HE reports that he is getting in bad shape because it has been many days with out it and he is going through withdraw.Dr. Stevens notified.

## 2025-01-16 NOTE — ED TRIAGE NOTES
Patient present to treatment area by wheel chair repors 3-4 days of headache, 5 days of stomach pain with nausea and vomiting and 10 days of bilateral leg pain states normally takes oxycontin for pain however pharmacy was out of medication.

## 2025-01-17 NOTE — TELEPHONE ENCOUNTER
Spoke with patient and updated on medication sent to his pharmacy. He states understanding and grateful for the call.

## 2025-01-17 NOTE — ED NOTES
Patient returned from CT scan in stable condition via wheelchair. Patient up at bedside for urine sample/
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
## 2025-01-23 ENCOUNTER — OFFICE VISIT (OUTPATIENT)
Facility: CLINIC | Age: 80
End: 2025-01-23
Payer: MEDICARE

## 2025-01-23 VITALS
HEIGHT: 66 IN | OXYGEN SATURATION: 93 % | DIASTOLIC BLOOD PRESSURE: 79 MMHG | HEART RATE: 89 BPM | TEMPERATURE: 98 F | SYSTOLIC BLOOD PRESSURE: 119 MMHG | WEIGHT: 192.4 LBS | BODY MASS INDEX: 30.92 KG/M2

## 2025-01-23 DIAGNOSIS — F11.20 NARCOTIC DEPENDENCE (HCC): ICD-10-CM

## 2025-01-23 DIAGNOSIS — K82.9 GALLBLADDER DISORDER: ICD-10-CM

## 2025-01-23 DIAGNOSIS — F33.1 MAJOR DEPRESSIVE DISORDER, RECURRENT, MODERATE (HCC): ICD-10-CM

## 2025-01-23 DIAGNOSIS — G89.4 CHRONIC PAIN SYNDROME: Primary | ICD-10-CM

## 2025-01-23 DIAGNOSIS — I10 ESSENTIAL HYPERTENSION: ICD-10-CM

## 2025-01-23 PROCEDURE — 99213 OFFICE O/P EST LOW 20 MIN: CPT | Performed by: INTERNAL MEDICINE

## 2025-01-23 PROCEDURE — 1123F ACP DISCUSS/DSCN MKR DOCD: CPT | Performed by: INTERNAL MEDICINE

## 2025-01-23 PROCEDURE — 1159F MED LIST DOCD IN RCRD: CPT | Performed by: INTERNAL MEDICINE

## 2025-01-23 PROCEDURE — 3078F DIAST BP <80 MM HG: CPT | Performed by: INTERNAL MEDICINE

## 2025-01-23 PROCEDURE — 3074F SYST BP LT 130 MM HG: CPT | Performed by: INTERNAL MEDICINE

## 2025-01-23 PROCEDURE — 1160F RVW MEDS BY RX/DR IN RCRD: CPT | Performed by: INTERNAL MEDICINE

## 2025-01-23 PROCEDURE — 1125F AMNT PAIN NOTED PAIN PRSNT: CPT | Performed by: INTERNAL MEDICINE

## 2025-01-23 RX ORDER — OXYCODONE HYDROCHLORIDE 80 MG/1
80 TABLET, FILM COATED, EXTENDED RELEASE ORAL EVERY 12 HOURS
Qty: 60 EACH | Refills: 0 | Status: SHIPPED | OUTPATIENT
Start: 2025-01-27 | End: 2025-02-26

## 2025-01-23 RX ORDER — OXYCODONE HYDROCHLORIDE 30 MG/1
30 TABLET ORAL
Qty: 120 TABLET | Refills: 0 | Status: SHIPPED | OUTPATIENT
Start: 2025-01-23 | End: 2025-02-22

## 2025-01-23 RX ORDER — OXYCODONE HYDROCHLORIDE 30 MG/1
30 TABLET ORAL
Qty: 120 TABLET | Refills: 0 | Status: SHIPPED | OUTPATIENT
Start: 2025-01-23 | End: 2025-01-23 | Stop reason: SDUPTHER

## 2025-01-23 ASSESSMENT — PATIENT HEALTH QUESTIONNAIRE - PHQ9
SUM OF ALL RESPONSES TO PHQ QUESTIONS 1-9: 15
8. MOVING OR SPEAKING SO SLOWLY THAT OTHER PEOPLE COULD HAVE NOTICED. OR THE OPPOSITE, BEING SO FIGETY OR RESTLESS THAT YOU HAVE BEEN MOVING AROUND A LOT MORE THAN USUAL: NEARLY EVERY DAY
SUM OF ALL RESPONSES TO PHQ9 QUESTIONS 1 & 2: 3
5. POOR APPETITE OR OVEREATING: NOT AT ALL
4. FEELING TIRED OR HAVING LITTLE ENERGY: NEARLY EVERY DAY
7. TROUBLE CONCENTRATING ON THINGS, SUCH AS READING THE NEWSPAPER OR WATCHING TELEVISION: NEARLY EVERY DAY
9. THOUGHTS THAT YOU WOULD BE BETTER OFF DEAD, OR OF HURTING YOURSELF: NOT AT ALL
10. IF YOU CHECKED OFF ANY PROBLEMS, HOW DIFFICULT HAVE THESE PROBLEMS MADE IT FOR YOU TO DO YOUR WORK, TAKE CARE OF THINGS AT HOME, OR GET ALONG WITH OTHER PEOPLE: NOT DIFFICULT AT ALL
SUM OF ALL RESPONSES TO PHQ QUESTIONS 1-9: 15
6. FEELING BAD ABOUT YOURSELF - OR THAT YOU ARE A FAILURE OR HAVE LET YOURSELF OR YOUR FAMILY DOWN: NEARLY EVERY DAY
SUM OF ALL RESPONSES TO PHQ QUESTIONS 1-9: 15
2. FEELING DOWN, DEPRESSED OR HOPELESS: NEARLY EVERY DAY
SUM OF ALL RESPONSES TO PHQ QUESTIONS 1-9: 15
3. TROUBLE FALLING OR STAYING ASLEEP: NOT AT ALL

## 2025-01-23 NOTE — PROGRESS NOTES
Identified pt with two pt identifiers(name and ). Reviewed record in preparation for visit and have obtained necessary documentation. All patient medications has been reviewed.  Chief Complaint   Patient presents with    Hypertension       Health Maintenance Due   Topic    Shingles vaccine (1 of 2)    Pneumococcal 65+ years Vaccine (2 of 2 - PCV)    Respiratory Syncytial Virus (RSV) Pregnant or age 60 yrs+ (1 - 1-dose 75+ series)    DTaP/Tdap/Td vaccine (2 - Td or Tdap)    Flu vaccine (1)    COVID-19 Vaccine (3 -  season)    Annual Wellness Visit (Medicare Advantage)      Health Maintenance Review: Patient reminded of \"due or due soon\" health maintenance. I have asked the patient to contact his/her primary care provider (PCP) for follow-up on his/her health maintenance.    Wt Readings from Last 3 Encounters:   25 87.3 kg (192 lb 6.4 oz)   25 81.6 kg (180 lb)   24 84.8 kg (187 lb)     Temp Readings from Last 3 Encounters:   25 97.7 °F (36.5 °C)   24 97.6 °F (36.4 °C) (Temporal)   24 99.1 °F (37.3 °C) (Oral)     BP Readings from Last 3 Encounters:   25 (!) 190/98   24 104/69   24 125/75     Pulse Readings from Last 3 Encounters:   25 (!) 111   24 79   24 69       1. \"Have you been to the ER, urgent care clinic since your last visit?  Hospitalized since your last visit?\" Yes 25 for abdominal pain, headache, and leg pain    2. \"Have you seen or consulted any other health care providers outside of the [facility] since your last visit?\" No     3. For patients aged 45-75: Has the patient had a colonoscopy / FIT/ Cologuard? NA - based on age    Patient is accompanied by self I have received verbal consent from Lorenzo Johnson to discuss any/all medical information while they are present in the room.

## 2025-01-25 NOTE — PROGRESS NOTES
HISTORY OF PRESENT ILLNESS    Chief Complaint   Patient presents with    Hypertension       Presents for follow-up of emergency room visit January 16, 2025.  Patient was seen with 3 to 4 days of headache, abdominal pain, nausea, vomiting and increasing leg pain.  He is maintained on a high dose narcotic regimen chronically and had difficulty finding OxyContin at local pharmacies.  So, he was out of medication for at least 5 days.    Workup in the emergency room reviewed.  Blood pressure markedly elevated 222/125  CBC normal.  Metabolic panel showed mildly elevated glucose nonfasting 147.  Mildly elevated alkaline phosphatase with normal transaminases.  Bilirubin 1.5.  Urinalysis did show 40 ketones and no RBCs.  Head CT revealed no acute process.  Final assessment is not entirely clear, but he was discharged home.  Fortunately, he was able to go OxyContin as prescribed by me1/17/25 #21 pills   Due to lack of OxyContin, he was taking oxycodone every 3-4 hours instead of every 6 hours as prescribed.  Today, he is back to baseline.  Has ongoing severe back pain, antalgic gait.  No recent falls.  Blood pressure is well-controlled in the office today on current therapy with olmesartan.    Review of Systems   All other systems reviewed and are negative, except as noted in HPI    Past Medical and Surgical History   has a past medical history of Arthritis, Chronic back pain, Chronic neck pain, Depression, major, HTN (hypertension), Hyperlipidemia LDL goal < 100, Lumbar radicular pain, Psoriasiform dermatitis, Psoriasis, PTSD (post-traumatic stress disorder), Seborrheic dermatitis, and Stenosis of cervical spine with myelopathy (HCC).     has a past surgical history that includes lumbar laminectomy (1991) and Colonoscopy (2008).     reports that he has never smoked. He has never used smokeless tobacco. He reports that he does not drink alcohol and does not use drugs.    family history includes Cancer in his

## 2025-02-17 ENCOUNTER — OFFICE VISIT (OUTPATIENT)
Facility: CLINIC | Age: 80
End: 2025-02-17
Payer: MEDICARE

## 2025-02-17 VITALS
HEIGHT: 66 IN | BODY MASS INDEX: 31.43 KG/M2 | DIASTOLIC BLOOD PRESSURE: 88 MMHG | OXYGEN SATURATION: 95 % | HEART RATE: 94 BPM | TEMPERATURE: 97.6 F | SYSTOLIC BLOOD PRESSURE: 157 MMHG | WEIGHT: 195.6 LBS

## 2025-02-17 DIAGNOSIS — F11.20 NARCOTIC DEPENDENCE (HCC): ICD-10-CM

## 2025-02-17 DIAGNOSIS — G89.4 CHRONIC PAIN SYNDROME: Primary | ICD-10-CM

## 2025-02-17 DIAGNOSIS — M79.605 LEFT LEG PAIN: ICD-10-CM

## 2025-02-17 DIAGNOSIS — R60.0 EDEMA OF BOTH LEGS: ICD-10-CM

## 2025-02-17 PROCEDURE — 99213 OFFICE O/P EST LOW 20 MIN: CPT | Performed by: INTERNAL MEDICINE

## 2025-02-17 PROCEDURE — 3079F DIAST BP 80-89 MM HG: CPT | Performed by: INTERNAL MEDICINE

## 2025-02-17 PROCEDURE — 3077F SYST BP >= 140 MM HG: CPT | Performed by: INTERNAL MEDICINE

## 2025-02-17 PROCEDURE — 1125F AMNT PAIN NOTED PAIN PRSNT: CPT | Performed by: INTERNAL MEDICINE

## 2025-02-17 PROCEDURE — 1123F ACP DISCUSS/DSCN MKR DOCD: CPT | Performed by: INTERNAL MEDICINE

## 2025-02-17 RX ORDER — OXYCODONE HCL 80 MG/1
80 TABLET, FILM COATED, EXTENDED RELEASE ORAL EVERY 12 HOURS
Qty: 60 EACH | Refills: 0 | Status: SHIPPED | OUTPATIENT
Start: 2025-02-17 | End: 2025-03-19

## 2025-02-17 NOTE — PROGRESS NOTES
Identified pt with two pt identifiers(name and ). Reviewed record in preparation for visit and have obtained necessary documentation. All patient medications has been reviewed.  No chief complaint on file.      Health Maintenance Due   Topic    Shingles vaccine (1 of 2)    Pneumococcal 50+ years Vaccine (2 of 2 - PCV)    Respiratory Syncytial Virus (RSV) Pregnant or age 60 yrs+ (1 - 1-dose 75+ series)    DTaP/Tdap/Td vaccine (2 - Td or Tdap)    Flu vaccine (1)    COVID-19 Vaccine (3 -  season)    Annual Wellness Visit (Medicare Advantage)      Health Maintenance Review: Patient reminded of \"due or due soon\" health maintenance. I have asked the patient to contact his/her primary care provider (PCP) for follow-up on his/her health maintenance.    Wt Readings from Last 3 Encounters:   25 88.7 kg (195 lb 9.6 oz)   25 87.3 kg (192 lb 6.4 oz)   25 81.6 kg (180 lb)     Temp Readings from Last 3 Encounters:   25 97.6 °F (36.4 °C)   25 98 °F (36.7 °C)   25 97.7 °F (36.5 °C)     BP Readings from Last 3 Encounters:   25 (!) 157/88   25 119/79   25 (!) 190/98     Pulse Readings from Last 3 Encounters:   25 94   25 89   25 (!) 111       1. \"Have you been to the ER, urgent care clinic since your last visit?  Hospitalized since your last visit?\" No    2. \"Have you seen or consulted any other health care providers outside of the [facility] since your last visit?\" No     3. For patients aged 45-75: Has the patient had a colonoscopy / FIT/ Cologuard? NA - based on age    Patient is accompanied by self I have received verbal consent from Lorenzo Johnson to discuss any/all medical information while they are present in the room.

## 2025-02-17 NOTE — PROGRESS NOTES
HISTORY OF PRESENT ILLNESS    Chief Complaint   Patient presents with    Leg Pain       Presents for follow-up    History of Present Illness  The patient is a 79-year-old male who presents with leg pain.    He reports experiencing stiffness and numbness in the first three toes of his left foot, which has significantly limited his mobility. This symptom is new to him. He also describes a similar sensation in his right foot, although it is less severe, and he retains the ability to wiggle his toes on this side. He experiences pain when walking or pushing on his toes but does not report any associated swelling. Additionally, he reports pain in his thigh and mild discomfort in his calf. He is unable to move his ankle due to pain. He has no history of gout.    He expresses concern about his medication supply, specifically OxyContin 80 mg bid, as he has been unable to obtain the full prescribed amount due to a reported shortage. He has attempted to manage his pain by reducing his dosage to one pill per day, but this has proven ineffective. He notes that his pain levels decrease and his activity levels increase when he has access to his medication. Without it, he struggles with mobility and is unable to exercise, leading to a progressive worsening of his condition.    MEDICATIONS  OxyContin    Results      Review of Systems   All other systems reviewed and are negative, except as noted in HPI    Current Outpatient Medications   Medication Sig    oxyCODONE (OXYCONTIN) 80 MG extended release tablet Take 1 tablet by mouth in the morning and 1 tablet in the evening. Do all this for 30 days. Max Daily Amount: 160 mg.    oxyCODONE (OXY-IR) 30 MG immediate release tablet Take 1 tablet by mouth every 6 hours for 30 days. FILL TODAY 1/23/25 - Early refill is needed since 30 mg pills needed to bridge due to lack of long-acting medication shortage Max Daily Amount: 120 mg    ondansetron (ZOFRAN-ODT) 4 MG disintegrating tablet Take 1

## 2025-02-27 DIAGNOSIS — G89.4 CHRONIC PAIN SYNDROME: ICD-10-CM

## 2025-02-27 DIAGNOSIS — F11.20 NARCOTIC DEPENDENCE (HCC): ICD-10-CM

## 2025-02-27 RX ORDER — OXYCODONE HYDROCHLORIDE 30 MG/1
30 TABLET ORAL
Qty: 120 TABLET | Refills: 0 | Status: SHIPPED | OUTPATIENT
Start: 2025-02-27 | End: 2025-03-29

## 2025-03-23 DIAGNOSIS — I10 ESSENTIAL HYPERTENSION: ICD-10-CM

## 2025-03-24 RX ORDER — OLMESARTAN MEDOXOMIL 40 MG/1
40 TABLET ORAL DAILY
Qty: 90 TABLET | Refills: 1 | Status: SHIPPED | OUTPATIENT
Start: 2025-03-24

## 2025-03-26 ENCOUNTER — TELEPHONE (OUTPATIENT)
Facility: CLINIC | Age: 80
End: 2025-03-26

## 2025-03-26 DIAGNOSIS — M54.40 CHRONIC BILATERAL LOW BACK PAIN WITH SCIATICA, SCIATICA LATERALITY UNSPECIFIED: ICD-10-CM

## 2025-03-26 DIAGNOSIS — M79.604 LOW BACK PAIN RADIATING TO BOTH LEGS: Primary | ICD-10-CM

## 2025-03-26 DIAGNOSIS — M79.605 LEFT LEG PAIN: ICD-10-CM

## 2025-03-26 DIAGNOSIS — F11.20 NARCOTIC DEPENDENCE (HCC): ICD-10-CM

## 2025-03-26 DIAGNOSIS — M79.605 LOW BACK PAIN RADIATING TO BOTH LEGS: Primary | ICD-10-CM

## 2025-03-26 DIAGNOSIS — G89.29 OTHER CHRONIC PAIN: ICD-10-CM

## 2025-03-26 DIAGNOSIS — M54.50 LOW BACK PAIN RADIATING TO BOTH LEGS: Primary | ICD-10-CM

## 2025-03-26 DIAGNOSIS — G89.4 CHRONIC PAIN SYNDROME: ICD-10-CM

## 2025-03-26 DIAGNOSIS — G89.29 CHRONIC BILATERAL LOW BACK PAIN WITH SCIATICA, SCIATICA LATERALITY UNSPECIFIED: ICD-10-CM

## 2025-03-26 RX ORDER — OXYCODONE HCL 80 MG/1
80 TABLET, FILM COATED, EXTENDED RELEASE ORAL EVERY 12 HOURS
Qty: 60 EACH | Refills: 0 | Status: SHIPPED | OUTPATIENT
Start: 2025-03-26 | End: 2025-04-25

## 2025-03-26 NOTE — TELEPHONE ENCOUNTER
The patient is requesting a Rx refill on the medication listed below:   OxyContin 80 mg    [facility]/pharmacy #[id number] - [place], [place] - [address] - [place] [phone] - F [phone]

## 2025-04-17 ENCOUNTER — TELEPHONE (OUTPATIENT)
Facility: CLINIC | Age: 80
End: 2025-04-17

## 2025-04-17 DIAGNOSIS — G89.29 CHRONIC LOW BACK PAIN WITH SCIATICA, SCIATICA LATERALITY UNSPECIFIED, UNSPECIFIED BACK PAIN LATERALITY: ICD-10-CM

## 2025-04-17 DIAGNOSIS — F11.20 NARCOTIC DEPENDENCE (HCC): ICD-10-CM

## 2025-04-17 DIAGNOSIS — M54.50 LOW BACK PAIN RADIATING TO BOTH LEGS: ICD-10-CM

## 2025-04-17 DIAGNOSIS — G89.29 CHRONIC BILATERAL LOW BACK PAIN WITH SCIATICA, SCIATICA LATERALITY UNSPECIFIED: ICD-10-CM

## 2025-04-17 DIAGNOSIS — M54.42 CHRONIC BILATERAL LOW BACK PAIN WITH SCIATICA, SCIATICA LATERALITY UNSPECIFIED: ICD-10-CM

## 2025-04-17 DIAGNOSIS — M54.40 CHRONIC LOW BACK PAIN WITH SCIATICA, SCIATICA LATERALITY UNSPECIFIED, UNSPECIFIED BACK PAIN LATERALITY: ICD-10-CM

## 2025-04-17 DIAGNOSIS — M79.605 LEFT LEG PAIN: ICD-10-CM

## 2025-04-17 DIAGNOSIS — M79.605 LOW BACK PAIN RADIATING TO BOTH LEGS: ICD-10-CM

## 2025-04-17 DIAGNOSIS — G89.4 CHRONIC PAIN SYNDROME: ICD-10-CM

## 2025-04-17 DIAGNOSIS — G89.29 OTHER CHRONIC PAIN: ICD-10-CM

## 2025-04-17 DIAGNOSIS — M19.90 ARTHRITIS: Primary | ICD-10-CM

## 2025-04-17 DIAGNOSIS — M54.41 CHRONIC BILATERAL LOW BACK PAIN WITH SCIATICA, SCIATICA LATERALITY UNSPECIFIED: ICD-10-CM

## 2025-04-17 DIAGNOSIS — M79.604 LOW BACK PAIN RADIATING TO BOTH LEGS: ICD-10-CM

## 2025-04-17 RX ORDER — OXYCODONE HYDROCHLORIDE 30 MG/1
30 TABLET ORAL EVERY 6 HOURS PRN
Qty: 120 TABLET | Refills: 0 | Status: SHIPPED | OUTPATIENT
Start: 2025-04-17 | End: 2025-05-17

## 2025-04-17 NOTE — TELEPHONE ENCOUNTER
The patient is requesting a Rx refill on the medication listed below:  oxyCODONE (OXYCONTIN) 30 mg extended release tablet   [facility]/pharmacy #[id number] - [place], [place] - [address]  LE[address] [phone] - F [phone]

## 2025-05-05 DIAGNOSIS — F11.20 OPIOID DEPENDENCE, UNCOMPLICATED (HCC): ICD-10-CM

## 2025-05-05 DIAGNOSIS — M54.42 CHRONIC BILATERAL LOW BACK PAIN WITH SCIATICA, SCIATICA LATERALITY UNSPECIFIED: ICD-10-CM

## 2025-05-05 DIAGNOSIS — F11.20 NARCOTIC DEPENDENCE (HCC): ICD-10-CM

## 2025-05-05 DIAGNOSIS — M79.605 LOW BACK PAIN RADIATING TO BOTH LEGS: Primary | ICD-10-CM

## 2025-05-05 DIAGNOSIS — M54.50 CHRONIC LOW BACK PAIN, UNSPECIFIED BACK PAIN LATERALITY, UNSPECIFIED WHETHER SCIATICA PRESENT: ICD-10-CM

## 2025-05-05 DIAGNOSIS — G89.29 OTHER CHRONIC PAIN: ICD-10-CM

## 2025-05-05 DIAGNOSIS — G89.29 CHRONIC BILATERAL LOW BACK PAIN WITH SCIATICA, SCIATICA LATERALITY UNSPECIFIED: ICD-10-CM

## 2025-05-05 DIAGNOSIS — M54.50 LOW BACK PAIN RADIATING TO BOTH LEGS: Primary | ICD-10-CM

## 2025-05-05 DIAGNOSIS — M79.604 LOW BACK PAIN RADIATING TO BOTH LEGS: Primary | ICD-10-CM

## 2025-05-05 DIAGNOSIS — G89.4 CHRONIC PAIN SYNDROME: ICD-10-CM

## 2025-05-05 DIAGNOSIS — M54.41 CHRONIC BILATERAL LOW BACK PAIN WITH SCIATICA, SCIATICA LATERALITY UNSPECIFIED: ICD-10-CM

## 2025-05-05 DIAGNOSIS — G89.29 CHRONIC LOW BACK PAIN, UNSPECIFIED BACK PAIN LATERALITY, UNSPECIFIED WHETHER SCIATICA PRESENT: ICD-10-CM

## 2025-05-05 DIAGNOSIS — M79.605 LEFT LEG PAIN: ICD-10-CM

## 2025-05-05 RX ORDER — OXYCODONE HCL 80 MG/1
80 TABLET, FILM COATED, EXTENDED RELEASE ORAL EVERY 12 HOURS
Qty: 60 EACH | Refills: 0 | Status: SHIPPED | OUTPATIENT
Start: 2025-05-05 | End: 2025-06-04

## 2025-05-05 NOTE — TELEPHONE ENCOUNTER
Refill request     oxyCODONE (OXYCONTIN) 80 MG extended release tablet     [facility]/pharmacy #[id number] - [place], [place] - [address] - LE[place] [phone] - F [phone]

## 2025-05-20 ENCOUNTER — TELEPHONE (OUTPATIENT)
Facility: CLINIC | Age: 80
End: 2025-05-20

## 2025-05-20 DIAGNOSIS — G89.4 CHRONIC PAIN SYNDROME: ICD-10-CM

## 2025-05-20 DIAGNOSIS — M79.605 LOW BACK PAIN RADIATING TO BOTH LEGS: Primary | ICD-10-CM

## 2025-05-20 DIAGNOSIS — M79.604 LOW BACK PAIN RADIATING TO BOTH LEGS: Primary | ICD-10-CM

## 2025-05-20 DIAGNOSIS — F11.20 NARCOTIC DEPENDENCE (HCC): ICD-10-CM

## 2025-05-20 DIAGNOSIS — F11.20 OPIOID DEPENDENCE, UNCOMPLICATED (HCC): ICD-10-CM

## 2025-05-20 DIAGNOSIS — G89.29 CHRONIC LOW BACK PAIN WITH SCIATICA, SCIATICA LATERALITY UNSPECIFIED, UNSPECIFIED BACK PAIN LATERALITY: ICD-10-CM

## 2025-05-20 DIAGNOSIS — M54.40 CHRONIC LOW BACK PAIN WITH SCIATICA, SCIATICA LATERALITY UNSPECIFIED, UNSPECIFIED BACK PAIN LATERALITY: ICD-10-CM

## 2025-05-20 DIAGNOSIS — M54.50 LOW BACK PAIN RADIATING TO BOTH LEGS: Primary | ICD-10-CM

## 2025-05-20 RX ORDER — OXYCODONE HYDROCHLORIDE 30 MG/1
30 TABLET ORAL EVERY 6 HOURS PRN
Qty: 120 TABLET | Refills: 0 | Status: SHIPPED | OUTPATIENT
Start: 2025-05-20 | End: 2025-06-19

## 2025-05-20 NOTE — TELEPHONE ENCOUNTER
The patient is requesting a Rx refill on the medication listed below:   oxyCODONE (OXYCONTIN) 30MG    [facility]/pharmacy #[id number] - [place], [place] - [address]  LE[address] [phone] - F [phone]

## 2025-06-10 ENCOUNTER — TELEPHONE (OUTPATIENT)
Facility: CLINIC | Age: 80
End: 2025-06-10

## 2025-06-10 DIAGNOSIS — M54.50 CHRONIC LOW BACK PAIN, UNSPECIFIED BACK PAIN LATERALITY, UNSPECIFIED WHETHER SCIATICA PRESENT: Primary | ICD-10-CM

## 2025-06-10 DIAGNOSIS — F11.20 OPIOID DEPENDENCE, UNCOMPLICATED (HCC): ICD-10-CM

## 2025-06-10 DIAGNOSIS — M54.42 CHRONIC BILATERAL LOW BACK PAIN WITH SCIATICA, SCIATICA LATERALITY UNSPECIFIED: ICD-10-CM

## 2025-06-10 DIAGNOSIS — G89.29 CHRONIC LOW BACK PAIN, UNSPECIFIED BACK PAIN LATERALITY, UNSPECIFIED WHETHER SCIATICA PRESENT: Primary | ICD-10-CM

## 2025-06-10 DIAGNOSIS — M54.41 CHRONIC BILATERAL LOW BACK PAIN WITH SCIATICA, SCIATICA LATERALITY UNSPECIFIED: ICD-10-CM

## 2025-06-10 DIAGNOSIS — G89.29 CHRONIC BILATERAL LOW BACK PAIN WITH SCIATICA, SCIATICA LATERALITY UNSPECIFIED: ICD-10-CM

## 2025-06-10 DIAGNOSIS — F11.20 NARCOTIC DEPENDENCE (HCC): ICD-10-CM

## 2025-06-10 DIAGNOSIS — G89.4 CHRONIC PAIN SYNDROME: ICD-10-CM

## 2025-06-10 NOTE — TELEPHONE ENCOUNTER
The patient is requesting a Rx refill on the medication listed below:    OxyContin 80 mg  [facility]/pharmacy #[id number] - [place], [place] - [address] - [place] [phone] - F [phone]

## 2025-06-12 RX ORDER — OXYCODONE HCL 80 MG/1
80 TABLET, FILM COATED, EXTENDED RELEASE ORAL EVERY 12 HOURS
Qty: 60 EACH | Refills: 0 | Status: SHIPPED | OUTPATIENT
Start: 2025-06-12 | End: 2025-07-12

## 2025-06-12 NOTE — TELEPHONE ENCOUNTER
Please call patient: set up PCP appointment. Nothing is currently scheduled.      reviewed. Refills of controlled medication have been appropriate. Will refill the requested Rx today.

## 2025-06-12 NOTE — TELEPHONE ENCOUNTER
Spoke with patient and assisted with scheduling an appointment for 6/27/25 at 3:40 PM. Grateful for the call.

## 2025-06-17 DIAGNOSIS — M54.42 CHRONIC BILATERAL LOW BACK PAIN WITH SCIATICA, SCIATICA LATERALITY UNSPECIFIED: ICD-10-CM

## 2025-06-17 DIAGNOSIS — F11.20 NARCOTIC DEPENDENCE (HCC): ICD-10-CM

## 2025-06-17 DIAGNOSIS — M54.41 CHRONIC BILATERAL LOW BACK PAIN WITH SCIATICA, SCIATICA LATERALITY UNSPECIFIED: ICD-10-CM

## 2025-06-17 DIAGNOSIS — G89.29 CHRONIC BILATERAL LOW BACK PAIN WITH SCIATICA, SCIATICA LATERALITY UNSPECIFIED: ICD-10-CM

## 2025-06-17 DIAGNOSIS — G89.4 CHRONIC PAIN SYNDROME: ICD-10-CM

## 2025-06-17 DIAGNOSIS — F11.20 OPIOID DEPENDENCE, UNCOMPLICATED (HCC): ICD-10-CM

## 2025-06-17 RX ORDER — OXYCODONE HCL 80 MG/1
80 TABLET, FILM COATED, EXTENDED RELEASE ORAL EVERY 12 HOURS
Qty: 40 EACH | Refills: 0 | Status: SHIPPED | OUTPATIENT
Start: 2025-06-17 | End: 2025-07-07

## 2025-06-17 NOTE — TELEPHONE ENCOUNTER
Spoke with patient and he reports that the [facility] pharmacy only had 20 pills of his OxyContin 80 mg. He was told that a new script will need to be sent for him to get the other  40 pills when they come in. Dr. Stevens notified.

## 2025-06-27 ENCOUNTER — OFFICE VISIT (OUTPATIENT)
Facility: CLINIC | Age: 80
End: 2025-06-27

## 2025-06-27 VITALS
SYSTOLIC BLOOD PRESSURE: 129 MMHG | RESPIRATION RATE: 15 BRPM | HEART RATE: 96 BPM | HEIGHT: 66 IN | WEIGHT: 195 LBS | OXYGEN SATURATION: 94 % | DIASTOLIC BLOOD PRESSURE: 82 MMHG | TEMPERATURE: 97.4 F | BODY MASS INDEX: 31.34 KG/M2

## 2025-06-27 DIAGNOSIS — T67.01XD HEAT STROKE, SUBSEQUENT ENCOUNTER: ICD-10-CM

## 2025-06-27 DIAGNOSIS — Z79.899 MEDICATION MANAGEMENT: ICD-10-CM

## 2025-06-27 DIAGNOSIS — G89.29 CHRONIC LOW BACK PAIN WITH SCIATICA, SCIATICA LATERALITY UNSPECIFIED, UNSPECIFIED BACK PAIN LATERALITY: ICD-10-CM

## 2025-06-27 DIAGNOSIS — G89.4 CHRONIC PAIN SYNDROME: Primary | ICD-10-CM

## 2025-06-27 DIAGNOSIS — I10 ESSENTIAL HYPERTENSION: ICD-10-CM

## 2025-06-27 DIAGNOSIS — F11.20 OPIOID DEPENDENCE, UNCOMPLICATED (HCC): ICD-10-CM

## 2025-06-27 DIAGNOSIS — M54.40 CHRONIC LOW BACK PAIN WITH SCIATICA, SCIATICA LATERALITY UNSPECIFIED, UNSPECIFIED BACK PAIN LATERALITY: ICD-10-CM

## 2025-06-27 LAB
AMPHETAMINE, URINE, POC: NEGATIVE
BARBITURATES, URINE, POC: NEGATIVE
BENZODIAZEPINES, URINE, POC: NEGATIVE
COCAINE, URINE, POC: NEGATIVE
LOT EXP DATE, POC: NORMAL
Lab: NORMAL
MARIJUANA (THC), URINE, POC: NEGATIVE
MDMA/ECSTASY, URINE, POC: NEGATIVE
METHADONE, URINE, POC: NEGATIVE
METHAMPHETAMINE, URINE, POC: NEGATIVE
NTI OTHER MICRO TRANSPORT: NEGATIVE
OPIATES, URINE, POC: NORMAL
OXYCODONE, URINE, POC: NEGATIVE
VALID INTERNAL CONTROL, POC: YES

## 2025-06-27 RX ORDER — OXYCODONE HCL 80 MG/1
80 TABLET, FILM COATED, EXTENDED RELEASE ORAL EVERY 12 HOURS
Qty: 60 EACH | Refills: 0 | Status: SHIPPED | OUTPATIENT
Start: 2025-07-07 | End: 2025-08-06

## 2025-06-27 RX ORDER — OXYCODONE HYDROCHLORIDE 30 MG/1
30 TABLET ORAL EVERY 6 HOURS PRN
Qty: 120 TABLET | Refills: 0 | Status: SHIPPED | OUTPATIENT
Start: 2025-06-27 | End: 2025-07-27

## 2025-06-27 NOTE — PROGRESS NOTES
Identified pt with two pt identifiers(name and ). Reviewed record in preparation for visit and have obtained necessary documentation. All patient medications has been reviewed.  Chief Complaint   Patient presents with    Chronic Pain       Health Maintenance Due   Topic    Shingles vaccine (1 of 2)    Pneumococcal 50+ years Vaccine (2 of 2 - PCV)    Respiratory Syncytial Virus (RSV) Pregnant or age 60 yrs+ (1 - 1-dose 75+ series)    DTaP/Tdap/Td vaccine (2 - Td or Tdap)    COVID-19 Vaccine (3 -  season)    Annual Wellness Visit (Medicare Advantage)      Health Maintenance Review: Patient reminded of \"due or due soon\" health maintenance. I have asked the patient to contact his/her primary care provider (PCP) for follow-up on his/her health maintenance.    Wt Readings from Last 3 Encounters:   25 88.5 kg (195 lb)   25 88.7 kg (195 lb 9.6 oz)   25 87.3 kg (192 lb 6.4 oz)     Temp Readings from Last 3 Encounters:   25 97.4 °F (36.3 °C) (Oral)   25 97.6 °F (36.4 °C)   25 98 °F (36.7 °C)     BP Readings from Last 3 Encounters:   25 129/82   25 (!) 157/88   25 119/79     Pulse Readings from Last 3 Encounters:   25 96   25 94   25 89       1. \"Have you been to the ER, urgent care clinic since your last visit?  Hospitalized since your last visit?\" ER for altered mental status    2. \"Have you seen or consulted any other health care providers outside of the [facility] since your last visit?\" No     3. For patients aged 45-75: Has the patient had a colonoscopy / FIT/ Cologuard? NA - based on age    Patient is accompanied by self I have received verbal consent from Lorenzo Johnson to discuss any/all medical information while they are present in the room.

## 2025-06-28 DIAGNOSIS — Z79.899 MEDICATION MANAGEMENT: ICD-10-CM
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
## 2025-06-29 ENCOUNTER — RESULTS FOLLOW-UP (OUTPATIENT)
Facility: CLINIC | Age: 80
End: 2025-06-29

## 2025-07-06 LAB
AMPHETAMINES UR QL SCN: NEGATIVE NG/ML
BARBITURATES UR QL SCN: NEGATIVE NG/ML
BENZODIAZ UR QL SCN: NEGATIVE NG/ML
BZE UR QL SCN: NEGATIVE NG/ML
CANNABINOIDS UR QL SCN: NEGATIVE NG/ML
CREAT UR-MCNC: 330.5 MG/DL (ref 20–300)
LABORATORY COMMENT REPORT: ABNORMAL
METHADONE UR QL SCN: NEGATIVE NG/ML
OPIATES UR QL SCN: ABNORMAL NG/ML
OPIATES, URINE: NEGATIVE NG/ML
OXYCODONE URINE: POSITIVE
OXYCODONE+OXYMORPHONE UR QL SCN: ABNORMAL NG/ML
OXYCODONE, URINE CONFIRMATION: ABNORMAL NG/ML
OXYCODONE/OXYMORPHONE, URINE: POSITIVE
OXYMORPHONE, URINE CONFIRMATION: ABNORMAL NG/ML
OXYMORPHONE, URINE: POSITIVE
PCP UR QL: NEGATIVE NG/ML
PH UR: 5.2 (ref 4.5–8.9)
PROPOXYPH UR QL SCN: NEGATIVE NG/ML

## 2025-07-17 ENCOUNTER — TELEPHONE (OUTPATIENT)
Facility: CLINIC | Age: 80
End: 2025-07-17

## 2025-07-17 DIAGNOSIS — I10 ESSENTIAL HYPERTENSION: ICD-10-CM

## 2025-07-17 RX ORDER — OLMESARTAN MEDOXOMIL 40 MG/1
40 TABLET ORAL DAILY
Qty: 90 TABLET | Refills: 1 | Status: SHIPPED | OUTPATIENT
Start: 2025-07-17

## 2025-07-17 NOTE — TELEPHONE ENCOUNTER
Nataliya MARKHAM With [facility] called regarding patient only taking his   olmesartan (BENICAR) 40 MG tablet      Every couple of days not every day. [facility] is requesting a new prescription to reflect his correct dosage.     [phone] ext [phone]    Please send updated prescription if approved by doctor to [facility]/pharmacy #[id number] - [place], [place] - [address] [place] [address] - P [phone] - F [phone] o

## 2025-07-17 NOTE — TELEPHONE ENCOUNTER
Olmesartan 40 mg daily sent to [facility].  Medication is supposed to be taken every day and I will not change the prescription if he is not being compliant with the instructions

## 2025-07-28 DIAGNOSIS — F11.20 OPIOID DEPENDENCE, UNCOMPLICATED (HCC): ICD-10-CM

## 2025-07-28 DIAGNOSIS — M79.605 PAIN IN LEFT LEG: ICD-10-CM

## 2025-07-28 DIAGNOSIS — G89.4 CHRONIC PAIN SYNDROME: ICD-10-CM

## 2025-07-28 DIAGNOSIS — G89.29 CHRONIC BILATERAL LOW BACK PAIN, UNSPECIFIED WHETHER SCIATICA PRESENT: Primary | ICD-10-CM

## 2025-07-28 DIAGNOSIS — F11.20 NARCOTIC DEPENDENCE (HCC): ICD-10-CM

## 2025-07-28 DIAGNOSIS — M54.50 CHRONIC BILATERAL LOW BACK PAIN, UNSPECIFIED WHETHER SCIATICA PRESENT: Primary | ICD-10-CM

## 2025-07-28 DIAGNOSIS — M54.40 CHRONIC LOW BACK PAIN WITH SCIATICA, SCIATICA LATERALITY UNSPECIFIED, UNSPECIFIED BACK PAIN LATERALITY: ICD-10-CM

## 2025-07-28 DIAGNOSIS — M79.604 PAIN IN RIGHT LEG: ICD-10-CM

## 2025-07-28 DIAGNOSIS — G89.29 CHRONIC LOW BACK PAIN WITH SCIATICA, SCIATICA LATERALITY UNSPECIFIED, UNSPECIFIED BACK PAIN LATERALITY: ICD-10-CM

## 2025-07-28 RX ORDER — OXYCODONE HYDROCHLORIDE 30 MG/1
30 TABLET ORAL EVERY 6 HOURS PRN
Qty: 120 TABLET | Refills: 0 | Status: SHIPPED | OUTPATIENT
Start: 2025-07-28 | End: 2025-08-27

## 2025-07-28 NOTE — TELEPHONE ENCOUNTER
Patient calling for medication refill:    oxyCODONE (OXY-IR) 30 MG immediate release tablet      [facility]/pharmacy #[id number] - [place] [address] - P [phone] - F [phone] [address] [facility] [address]  Phone: [phone]  Fax: [phone]

## 2025-08-08 ENCOUNTER — TELEPHONE (OUTPATIENT)
Facility: CLINIC | Age: 80
End: 2025-08-08

## 2025-08-08 DIAGNOSIS — M79.605 LOW BACK PAIN RADIATING TO BOTH LEGS: ICD-10-CM

## 2025-08-08 DIAGNOSIS — M79.604 LOW BACK PAIN RADIATING TO BOTH LEGS: ICD-10-CM

## 2025-08-08 DIAGNOSIS — G89.29 CHRONIC BILATERAL LOW BACK PAIN WITH BILATERAL SCIATICA: ICD-10-CM

## 2025-08-08 DIAGNOSIS — G89.4 CHRONIC PAIN SYNDROME: ICD-10-CM

## 2025-08-08 DIAGNOSIS — M54.42 CHRONIC BILATERAL LOW BACK PAIN WITH BILATERAL SCIATICA: ICD-10-CM

## 2025-08-08 DIAGNOSIS — F11.20 OPIOID DEPENDENCE, UNCOMPLICATED (HCC): ICD-10-CM

## 2025-08-08 DIAGNOSIS — M54.41 CHRONIC BILATERAL LOW BACK PAIN WITH BILATERAL SCIATICA: ICD-10-CM

## 2025-08-08 DIAGNOSIS — F11.20 NARCOTIC DEPENDENCE (HCC): Primary | ICD-10-CM

## 2025-08-08 DIAGNOSIS — M54.50 LOW BACK PAIN RADIATING TO BOTH LEGS: ICD-10-CM

## 2025-08-08 RX ORDER — OXYCODONE HCL 80 MG/1
80 TABLET, FILM COATED, EXTENDED RELEASE ORAL EVERY 12 HOURS
Qty: 60 EACH | Refills: 0 | Status: SHIPPED | OUTPATIENT
Start: 2025-08-08 | End: 2025-09-07

## 2025-08-27 DIAGNOSIS — G89.4 CHRONIC PAIN SYNDROME: ICD-10-CM

## 2025-08-27 DIAGNOSIS — F11.20 NARCOTIC DEPENDENCE (HCC): ICD-10-CM

## 2025-08-27 DIAGNOSIS — M79.605 PAIN IN LEFT LEG: ICD-10-CM

## 2025-08-27 DIAGNOSIS — M79.604 PAIN IN RIGHT LEG: ICD-10-CM

## 2025-08-27 DIAGNOSIS — F11.20 OPIOID DEPENDENCE, UNCOMPLICATED (HCC): ICD-10-CM

## 2025-08-27 RX ORDER — OXYCODONE HYDROCHLORIDE 30 MG/1
30 TABLET ORAL EVERY 6 HOURS PRN
Qty: 120 TABLET | Refills: 0 | Status: SHIPPED | OUTPATIENT
Start: 2025-08-27 | End: 2025-09-26